# Patient Record
Sex: MALE | Race: BLACK OR AFRICAN AMERICAN | Employment: UNEMPLOYED | URBAN - METROPOLITAN AREA
[De-identification: names, ages, dates, MRNs, and addresses within clinical notes are randomized per-mention and may not be internally consistent; named-entity substitution may affect disease eponyms.]

---

## 2018-10-21 ENCOUNTER — HOSPITAL ENCOUNTER (EMERGENCY)
Age: 38
Discharge: HOME | End: 2018-10-21
Attending: EMERGENCY MEDICINE
Payer: COMMERCIAL

## 2018-10-21 VITALS
RESPIRATION RATE: 20 BRPM | TEMPERATURE: 98.6 F | HEART RATE: 95 BPM | HEIGHT: 74 IN | WEIGHT: 224 LBS | BODY MASS INDEX: 28.75 KG/M2 | OXYGEN SATURATION: 98 % | DIASTOLIC BLOOD PRESSURE: 82 MMHG | SYSTOLIC BLOOD PRESSURE: 125 MMHG

## 2018-10-21 DIAGNOSIS — J06.9 VIRAL URI WITH COUGH: Primary | ICD-10-CM

## 2018-10-21 LAB
FLUAV AG NPH QL IA: NEGATIVE
FLUBV AG NOSE QL IA: NEGATIVE

## 2018-10-21 PROCEDURE — 87081 CULTURE SCREEN ONLY: CPT | Performed by: PHYSICIAN ASSISTANT

## 2018-10-21 PROCEDURE — 74011250637 HC RX REV CODE- 250/637: Performed by: PHYSICIAN ASSISTANT

## 2018-10-21 PROCEDURE — 87804 INFLUENZA ASSAY W/OPTIC: CPT | Performed by: PHYSICIAN ASSISTANT

## 2018-10-21 PROCEDURE — 99282 EMERGENCY DEPT VISIT SF MDM: CPT

## 2018-10-21 RX ORDER — IBUPROFEN 800 MG/1
800 TABLET ORAL
Qty: 20 TAB | Refills: 0 | Status: SHIPPED | OUTPATIENT
Start: 2018-10-21 | End: 2018-10-28

## 2018-10-21 RX ORDER — IBUPROFEN 400 MG/1
800 TABLET ORAL
Status: COMPLETED | OUTPATIENT
Start: 2018-10-21 | End: 2018-10-21

## 2018-10-21 RX ADMIN — IBUPROFEN 800 MG: 400 TABLET, FILM COATED ORAL at 21:12

## 2018-10-21 NOTE — LETTER
NOTIFICATION OF RETURN TO WORK 
 
10/21/2018 9:30 PM 
 
Mr. Mitzy Figueroa [facility] [address] [facility] [address] [place] To Whom It May Concern: 
 
Mitzy Figueroa was under the care of [address] [facility] EMERGENCY DEPT. He will be able to return to work on Tuesday, 10/23/18. If there are questions or concerns please have the patient contact our office. Sincerely, Lexie Ovalle PA-C

## 2018-10-22 NOTE — DISCHARGE INSTRUCTIONS

## 2018-10-22 NOTE — ED PROVIDER NOTES
EMERGENCY DEPARTMENT HISTORY AND PHYSICAL EXAM 
 
8:51 PM 
 
 
Date: 10/21/2018 Patient Name: Steffen Carreon History of Presenting Illness Chief Complaint Patient presents with  Generalized Body Aches  Cough  Sore Throat History Provided By: Patient Chief Complaint: sore throat, congestion, chills, body aches, cough Duration:  Days Timing:  Acute Location: HEENT Quality: Aching Severity: 10 out of 10 Modifying Factors: no relief with OTC meds Associated Symptoms: denies any other associated signs or symptoms Additional History (Context): Steffen Carreon is a 34 y.o. male who presents to the emergency department for evaluation of sore throat, congestion, body aches, chills, and cough. Cough productive of clear sputum. Wife is here being seen for same complaints. He has been taking OTC cold meds without relief. Pt denies any fevers headache, dizziness or light headedness, CP or discomfort, SOB, n/v/d/c, abd pain, back pain, diaphoresis, melena/hematochezia, dysuria, hematuria, frequency, focal weakness/numbness/tingling, or rash. Patient has no other complaints at this time. PCP:  None Past History Past Medical History: 
Past Medical History:  
Diagnosis Date  Asthma Past Surgical History: 
History reviewed. No pertinent surgical history. Family History: 
History reviewed. No pertinent family history. Social History: 
Social History Tobacco Use  Smoking status: Former Smoker Packs/day: 1.00 Substance Use Topics  Alcohol use: No  
 Drug use: No  
 
 
Allergies: 
No Known Allergies Review of Systems Review of Systems Constitutional: Negative for chills and fever. HENT: Positive for congestion, rhinorrhea and sore throat. Respiratory: Positive for cough. Negative for shortness of breath. Cardiovascular: Negative for chest pain.   
Gastrointestinal: Negative for abdominal pain, constipation, diarrhea, nausea and vomiting. Genitourinary: Negative for dysuria, frequency and hematuria. Musculoskeletal: Positive for myalgias. Negative for back pain. Skin: Negative for rash and wound. Neurological: Negative for dizziness and headaches. Physical Exam  
 
Visit Vitals /82 (BP 1 Location: Left arm, BP Patient Position: At rest) Pulse 95 Temp 98.6 °F (37 °C) Resp 20 Ht 6' 2\" (1.88 m) Wt 101.6 kg (224 lb) SpO2 98% BMI 28.76 kg/m² Physical Exam  
Constitutional: He is oriented to person, place, and time. He appears well-developed and well-nourished. No distress. HENT:  
Head: Normocephalic and atraumatic. Bilateral erythematous nasal turbinates with rhinorrhea. Posterior oropharynx erythematous. Eyes: Conjunctivae and EOM are normal. Pupils are equal, round, and reactive to light. Neck: Normal range of motion. Neck supple. No thyromegaly present. Cardiovascular: Normal rate, regular rhythm and normal heart sounds. Pulmonary/Chest: Effort normal and breath sounds normal. No respiratory distress. He has no wheezes. He has no rales. He exhibits no tenderness. Lungs CTAB Abdominal: Soft. Bowel sounds are normal. He exhibits no distension. There is no tenderness. There is no rebound and no guarding. Musculoskeletal: Normal range of motion. He exhibits no edema or deformity. Lymphadenopathy:  
  He has no cervical adenopathy. Neurological: He is alert and oriented to person, place, and time. Skin: Skin is warm and dry. He is not diaphoretic. Psychiatric: He has a normal mood and affect. Nursing note and vitals reviewed. Diagnostic Study Results Labs - Recent Results (from the past 12 hour(s)) INFLUENZA A & B AG (RAPID TEST) Collection Time: 10/21/18  8:50 PM  
Result Value Ref Range Influenza A Antigen NEGATIVE  NEG Influenza B Antigen NEGATIVE  NEG    
STREP THROAT SCREEN Collection Time: 10/21/18  8:50 PM  
Result Value Ref Range Special Requests: NO SPECIAL REQUESTS Strep Screen NEGATIVE Culture result: PENDING Radiologic Studies - No results found. Medical Decision Making I am the first provider for this patient. I reviewed the vital signs, available nursing notes, past medical history, past surgical history, family history and social history. Vital Signs-Reviewed the patient's vital signs. Pulse Oximetry Analysis -  98% on room air (Interpretation) Records Reviewed: Nursing Notes and Old Medical Records (Time of Review: 8:51 PM) 
 
ED Course: Progress Notes, Reevaluation, and Consults: 
 
Provider Notes (Medical Decision Making):  
Differential Diagnosis:  influenza, mononucleosis, acute bronchitis, URI, streptococcal pharyngitis, pertussis, pneumonia, asthma exacerbation, allergic rhinitis Plan:  Pt presents in NAD, vitals wnl. Exam and HPI c/w viral etiology. Flu and strep negative. Will DC home with motrin. Advised on supportive care. At this time, patient is stable and appropriate for discharge home. Patient demonstrates understanding of current diagnoses and is in agreement with the treatment plan. They are advised that while the likelihood of serious underlying condition is low at this point given the evaluation performed today, we cannot fully rule it out. They are advised to immediately return with any new symptoms or worsening of current condition. All questions have been answered. Patient is given educational material regarding their diagnoses, including danger symptoms and when to return to the ED. Diagnosis Clinical Impression: 1. Viral URI with cough Disposition: [address] Follow-up Information Follow up With Specialties Details Why Contact [facility] 22  Call in 2 days To establish primary care [facility] [address] [phone] [address] [facility] EMERGENCY DEPT Emergency Medicine Go to As needed, If symptoms worsen [address] [address] [phone] Medication List  
  
START taking these medications   
ibuprofen 800 mg tablet Commonly known as:  MOTRIN Take 1 Tab by mouth every six (6) hours as needed for Pain for up to 7 days. Where to Get Your Medications Information about where to get these medications is not yet available Ask your nurse or doctor about these medications · ibuprofen 800 mg tablet 
  
 
_______________________________

## 2018-10-22 NOTE — ED NOTES
I have reviewed discharge instructions with the patient. The patient verbalized understanding. Patient armband removed and given to patient to take home. Patient was informed of the privacy risks if armband lost or stolen Current Discharge Medication List  
  
START taking these medications Details  
ibuprofen (MOTRIN) 800 mg tablet Take 1 Tab by mouth every six (6) hours as needed for Pain for up to 7 days. Qty: 20 Tab, Refills: 0

## 2018-10-24 LAB
B-HEM STREP THROAT QL CULT: NEGATIVE
BACTERIA SPEC CULT: NORMAL
SERVICE CMNT-IMP: NORMAL

## 2018-12-05 ENCOUNTER — APPOINTMENT (OUTPATIENT)
Dept: GENERAL RADIOLOGY | Age: 38
End: 2018-12-05
Attending: PHYSICIAN ASSISTANT
Payer: COMMERCIAL

## 2018-12-05 ENCOUNTER — HOSPITAL ENCOUNTER (EMERGENCY)
Age: 38
Discharge: HOME | End: 2018-12-05
Attending: EMERGENCY MEDICINE
Payer: COMMERCIAL

## 2018-12-05 VITALS
TEMPERATURE: 98.6 F | WEIGHT: 230 LBS | BODY MASS INDEX: 29.52 KG/M2 | SYSTOLIC BLOOD PRESSURE: 145 MMHG | DIASTOLIC BLOOD PRESSURE: 90 MMHG | HEART RATE: 66 BPM | HEIGHT: 74 IN | OXYGEN SATURATION: 97 % | RESPIRATION RATE: 16 BRPM

## 2018-12-05 DIAGNOSIS — M79.672 LEFT FOOT PAIN: Primary | ICD-10-CM

## 2018-12-05 DIAGNOSIS — M21.619 BUNION: ICD-10-CM

## 2018-12-05 LAB — GLUCOSE BLD STRIP.AUTO-MCNC: 97 MG/DL (ref 70–110)

## 2018-12-05 PROCEDURE — 82962 GLUCOSE BLOOD TEST: CPT

## 2018-12-05 PROCEDURE — 73630 X-RAY EXAM OF FOOT: CPT

## 2018-12-05 PROCEDURE — 99283 EMERGENCY DEPT VISIT LOW MDM: CPT

## 2018-12-05 RX ORDER — TRAMADOL HYDROCHLORIDE 50 MG/1
50 TABLET ORAL
Qty: 10 TAB | Refills: 0 | Status: SHIPPED | OUTPATIENT
Start: 2018-12-05 | End: 2019-03-05

## 2018-12-05 RX ORDER — NAPROXEN 500 MG/1
500 TABLET ORAL 2 TIMES DAILY WITH MEALS
Qty: 20 TAB | Refills: 0 | Status: SHIPPED | OUTPATIENT
Start: 2018-12-05 | End: 2019-03-05

## 2018-12-05 NOTE — LETTER
NOTIFICATION OF RETURN TO WORK / SCHOOL 
12/5/2018 Mr. Neli Maldonado [facility] [address] [facility] [address] To Whom It May Concern: 
 
Neli Maldonado was seen in the ED on 12/5/18 and may be excused from work for 2 days. Sincerely, Mya Bush PA-C

## 2018-12-06 NOTE — DISCHARGE INSTRUCTIONS
Foot Pain: Care Instructions  Your Care Instructions  Foot injuries that cause pain and swelling are fairly common. Almost all sports or home repair projects can cause a misstep that ends up as foot pain. Normal wear and tear, especially as you get older, also can cause foot pain. Most minor foot injuries will heal on their own, and home treatment is usually all you need to do. If you have a severe injury, you may need tests and treatment. Follow-up care is a key part of your treatment and safety. Be sure to make and go to all appointments, and call your doctor if you are having problems. It's also a good idea to know your test results and keep a list of the medicines you take. How can you care for yourself at home? · Take pain medicines exactly as directed. ? If the doctor gave you a prescription medicine for pain, take it as prescribed. ? If you are not taking a prescription pain medicine, ask your doctor if you can take an over-the-counter medicine. · Rest and protect your foot. Take a break from any activity that may cause pain. · Put ice or a cold pack on your foot for 10 to 20 minutes at a time. Put a thin cloth between the ice and your skin. · Prop up the sore foot on a pillow when you ice it or anytime you sit or lie down during the next 3 days. Try to keep it above the level of your heart. This will help reduce swelling. · Your doctor may recommend that you wrap your foot with an elastic bandage. Keep your foot wrapped for as long as your doctor advises. · If your doctor recommends crutches, use them as directed. · Wear roomy footwear. · As soon as pain and swelling end, begin gentle exercises of your foot. Your doctor can tell you which exercises will help. When should you call for help? Call 911 anytime you think you may need emergency care.  For example, call if:    · Your foot turns pale, white, blue, or cold.    Call your doctor now or seek immediate medical care if:    · You cannot move or stand on your foot.     · Your foot looks twisted or out of its normal position.     · Your foot is not stable when you step down.     · You have signs of infection, such as:  ? Increased pain, swelling, warmth, or redness. ? Red streaks leading from the sore area. ? Pus draining from a place on your foot. ? A fever.     · Your foot is numb or tingly.    Watch closely for changes in your health, and be sure to contact your doctor if:    · You do not get better as expected.     · You have bruises from an injury that last longer than 2 weeks. Where can you learn more? Go to http://aman-nivia.info/. Enter L343 in the search box to learn more about \"Foot Pain: Care Instructions. \"  Current as of: November 29, 2017  Content Version: 11.8  © 2374-1734 [employer]. Care instructions adapted under license by [employer] (which disclaims liability or warranty for this information). If you have questions about a medical condition or this instruction, always ask your healthcare professional. [place] [address] any warranty or liability for your use of this information. haystagg Activation    Thank you for requesting access to [employer]stagg. Please follow the instructions below to securely access and download your online medical record. haystagg allows you to send messages to your doctor, view your test results, renew your prescriptions, schedule appointments, and more. How Do I Sign Up? 1. In your internet browser, go to [email or web address]  2. Click on the First Time User? Click Here link in the Sign In box. You will be redirect to the New Member Sign Up page. 3. Enter your haystagg Access Code exactly as it appears below. You will not need to use this code after youve completed the sign-up process. If you do not sign up before the expiration date, you must request a new code.     haystagg Access Code: [email or web address]  Expires: 1/19/2019  7:45 PM (This is the date your [employer] access code will )    4. Enter the last four digits of your Social Security Number (xxxx) and Date of Birth (mm/dd/yyyy) as indicated and click Submit. You will be taken to the next sign-up page. 5. Create a Behalft ID. This will be your [employer] login ID and cannot be changed, so think of one that is secure and easy to remember. 6. Create a [employer] password. You can change your password at any time. 7. Enter your Password Reset Question and Answer. This can be used at a later time if you forget your password. 8. Enter your e-mail address. You will receive e-mail notification when new information is available in [address] 9. Click Sign Up. You can now view and download portions of your medical record. 10. Click the Download Summary menu link to download a portable copy of your medical information. Additional Information    If you have questions, please visit the Frequently Asked Questions section of the [employer] website at [email or web address] Remember, [employer] is NOT to be used for urgent needs. For medical emergencies, dial 911. Complete all medications as prescribed. Follow-up with podiatry in 1 week. Return to the ED immediately for any new or worsening symptoms.

## 2018-12-06 NOTE — ED PROVIDER NOTES
EMERGENCY DEPARTMENT HISTORY AND PHYSICAL EXAM 
 
Date: 12/5/2018 Patient Name: Jazmín Bermudez History of Presenting Illness Chief Complaint Patient presents with  Foot Pain History Provided By: patient Chief Complaint: foot pain Duration: several days Timing: acute Location: L foot Lulu  Severity:mild Modifying Factors: worse with walking Associated Symptoms: none Additional History (Context): Jazmín Bermudez is a 34 y.o. male with PMH asthma who presents with complaints of L foot pain x several days. Denies tx PTA. Pain is worse on ambulation. No other complaints. PCP: None Past History Past Medical History: 
Past Medical History:  
Diagnosis Date  Asthma Past Surgical History: No past surgical history on file. Family History: No family history on file. Social History: 
Social History Tobacco Use  Smoking status: Former Smoker Packs/day: 1.00 Substance Use Topics  Alcohol use: No  
 Drug use: No  
 
 
Allergies: 
No Known Allergies Review of Systems Review of Systems Constitutional: Negative. Negative for chills and fever. HENT: Negative. Negative for congestion, ear pain and rhinorrhea. Eyes: Negative. Negative for pain and redness. Respiratory: Negative. Negative for cough, shortness of breath, wheezing and stridor. Cardiovascular: Negative. Negative for chest pain and leg swelling. Gastrointestinal: Negative. Negative for abdominal pain, constipation, diarrhea, nausea and vomiting. Genitourinary: Negative. Negative for dysuria and frequency. Musculoskeletal: Positive for arthralgias. Negative for back pain and neck pain. Skin: Negative. Negative for rash and wound. Neurological: Negative. Negative for dizziness, seizures, syncope and headaches. All other systems reviewed and are negative. All Other Systems Negative Physical Exam  
 
Vitals:  
 12/05/18 1932 BP: 145/90 Pulse: 66 Resp: 16 Temp: 98.6 °F (37 °C) SpO2: 97% Weight: 104.3 kg (230 lb) Height: 6' 2\" (1.88 m) Physical Exam  
Constitutional: He is oriented to person, place, and time. He appears well-developed and well-nourished. No distress. HENT:  
Head: Normocephalic and atraumatic. Eyes: Conjunctivae are normal. Right eye exhibits no discharge. Left eye exhibits no discharge. No scleral icterus. Neck: Normal range of motion. Neck supple. Cardiovascular: Normal rate, regular rhythm and intact distal pulses. Pulmonary/Chest: Effort normal. No stridor. No respiratory distress. Musculoskeletal: Normal range of motion. He exhibits tenderness. He exhibits no edema or deformity. LLE: intact pulses, cap RF < 3 sec, no obvious deformity, ROM of all joints intact, cap RF < sec, bunion noted, TTP noted along the medial aspect of the second digit. No edema noted. Neurological: He is alert and oriented to person, place, and time. Coordination normal.  
Gait is steady. Able to ambulate without difficulty. Skin: Skin is warm and dry. No rash noted. He is not diaphoretic. No erythema. Psychiatric: He has a normal mood and affect. His behavior is normal. Thought content normal.  
Nursing note and vitals reviewed. Diagnostic Study Results Labs - Recent Results (from the past 12 hour(s)) GLUCOSE, POC Collection Time: 12/05/18  7:51 PM  
Result Value Ref Range Glucose (POC) 97 70 - 110 mg/dL Radiologic Studies -  
XR FOOT LT MIN 3 V    (Results Pending) DJD without evidence of acute process CT Results  (Last 48 hours) None CXR Results  (Last 48 hours) None Medical Decision Making I am the first provider for this patient. I reviewed the vital signs, available nursing notes, past medical history, past surgical history, family history and social history. Vital Signs-Reviewed the patient's vital signs. Records Reviewed: Mya Bush PA-C 7:19 PM  
 
Procedures: 
Procedures Provider Notes (Medical Decision Making): Impression:  L foot pain, bunion MED RECONCILIATION: 
No current facility-administered medications for this encounter. No current outpatient medications on file. Disposition: D/c 
 
DISCHARGE NOTE:  
Patient is stable for discharge at this time. Rx for naproxen and short course of ultram given. Rest and follow-up with podiatry this week. Return to the ED immediately for any new or worsening sx. April Talbert Aschoff, PA-C 8:27 PM  
 
Follow-up Information Follow up With Specialties Details Why Contact Info Katarina Chopra DPM Podiatry Schedule an appointment as soon as possible for a visit in 1 week  [address] [place] [address] 
[phone] [address] [facility] EMERGENCY DEPT Emergency Medicine  As needed, If symptoms worsen [address] [place] [address] 
[phone] Diagnosis Clinical Impression: 1. Left foot pain 2. Bunion

## 2019-03-01 ENCOUNTER — HOSPITAL ENCOUNTER (INPATIENT)
Age: 39
LOS: 4 days | Discharge: HOME | DRG: 885 | End: 2019-03-05
Attending: EMERGENCY MEDICINE | Admitting: PSYCHIATRY & NEUROLOGY
Payer: COMMERCIAL

## 2019-03-01 DIAGNOSIS — F31.30 BIPOLAR AFFECTIVE DISORDER, CURRENT EPISODE DEPRESSED, CURRENT EPISODE SEVERITY UNSPECIFIED (HCC): ICD-10-CM

## 2019-03-01 DIAGNOSIS — F14.10 COCAINE ABUSE (HCC): ICD-10-CM

## 2019-03-01 DIAGNOSIS — F32.A DEPRESSION, UNSPECIFIED DEPRESSION TYPE: ICD-10-CM

## 2019-03-01 DIAGNOSIS — R45.851 SUICIDAL IDEATION: Primary | ICD-10-CM

## 2019-03-01 PROBLEM — F31.9 BIPOLAR DISORDER, UNSPECIFIED (HCC): Status: ACTIVE | Noted: 2019-03-01

## 2019-03-01 LAB
ALBUMIN SERPL-MCNC: 3.8 G/DL (ref 3.4–5)
ALBUMIN/GLOB SERPL: 1.2 {RATIO} (ref 0.8–1.7)
ALP SERPL-CCNC: 62 U/L (ref 45–117)
ALT SERPL-CCNC: 22 U/L (ref 16–61)
AMPHET UR QL SCN: NEGATIVE
ANION GAP SERPL CALC-SCNC: 6 MMOL/L (ref 3–18)
APPEARANCE UR: CLEAR
AST SERPL-CCNC: 15 U/L (ref 15–37)
BARBITURATES UR QL SCN: NEGATIVE
BASOPHILS # BLD: 0 K/UL (ref 0–0.1)
BASOPHILS NFR BLD: 0 % (ref 0–2)
BENZODIAZ UR QL: NEGATIVE
BILIRUB SERPL-MCNC: 1 MG/DL (ref 0.2–1)
BILIRUB UR QL: NEGATIVE
BUN SERPL-MCNC: 14 MG/DL (ref 7–18)
BUN/CREAT SERPL: 13 (ref 12–20)
CALCIUM SERPL-MCNC: 8.7 MG/DL (ref 8.5–10.1)
CANNABINOIDS UR QL SCN: NEGATIVE
CHLORIDE SERPL-SCNC: 105 MMOL/L (ref 100–108)
CO2 SERPL-SCNC: 31 MMOL/L (ref 21–32)
COCAINE UR QL SCN: POSITIVE
COLOR UR: YELLOW
CREAT SERPL-MCNC: 1.12 MG/DL (ref 0.6–1.3)
DIFFERENTIAL METHOD BLD: ABNORMAL
EOSINOPHIL # BLD: 0.1 K/UL (ref 0–0.4)
EOSINOPHIL NFR BLD: 1 % (ref 0–5)
ERYTHROCYTE [DISTWIDTH] IN BLOOD BY AUTOMATED COUNT: 13.7 % (ref 11.6–14.5)
ETHANOL SERPL-MCNC: <3 MG/DL (ref 0–3)
GLOBULIN SER CALC-MCNC: 3.1 G/DL (ref 2–4)
GLUCOSE SERPL-MCNC: 86 MG/DL (ref 74–99)
GLUCOSE UR STRIP.AUTO-MCNC: NEGATIVE MG/DL
HCT VFR BLD AUTO: 48.9 % (ref 36–48)
HDSCOM,HDSCOM: ABNORMAL
HGB BLD-MCNC: 16.9 G/DL (ref 13–16)
HGB UR QL STRIP: NEGATIVE
KETONES UR QL STRIP.AUTO: 15 MG/DL
LEUKOCYTE ESTERASE UR QL STRIP.AUTO: NEGATIVE
LYMPHOCYTES # BLD: 1.9 K/UL (ref 0.9–3.6)
LYMPHOCYTES NFR BLD: 15 % (ref 21–52)
MCH RBC QN AUTO: 30 PG (ref 24–34)
MCHC RBC AUTO-ENTMCNC: 34.6 G/DL (ref 31–37)
MCV RBC AUTO: 86.7 FL (ref 74–97)
METHADONE UR QL: NEGATIVE
MONOCYTES # BLD: 0.7 K/UL (ref 0.05–1.2)
MONOCYTES NFR BLD: 5 % (ref 3–10)
NEUTS SEG # BLD: 9.4 K/UL (ref 1.8–8)
NEUTS SEG NFR BLD: 79 % (ref 40–73)
NITRITE UR QL STRIP.AUTO: NEGATIVE
OPIATES UR QL: NEGATIVE
PCP UR QL: NEGATIVE
PH UR STRIP: 5 [PH] (ref 5–8)
PLATELET # BLD AUTO: 178 K/UL (ref 135–420)
PMV BLD AUTO: 10.2 FL (ref 9.2–11.8)
POTASSIUM SERPL-SCNC: 3.9 MMOL/L (ref 3.5–5.5)
PROT SERPL-MCNC: 6.9 G/DL (ref 6.4–8.2)
PROT UR STRIP-MCNC: NEGATIVE MG/DL
RBC # BLD AUTO: 5.64 M/UL (ref 4.7–5.5)
SODIUM SERPL-SCNC: 142 MMOL/L (ref 136–145)
SP GR UR REFRACTOMETRY: 1.03 (ref 1–1.03)
UROBILINOGEN UR QL STRIP.AUTO: 0.2 EU/DL (ref 0.2–1)
WBC # BLD AUTO: 12 K/UL (ref 4.6–13.2)

## 2019-03-01 PROCEDURE — 85025 COMPLETE CBC W/AUTO DIFF WBC: CPT

## 2019-03-01 PROCEDURE — 74011250637 HC RX REV CODE- 250/637: Performed by: PSYCHIATRY & NEUROLOGY

## 2019-03-01 PROCEDURE — 65220000003 HC RM SEMIPRIVATE PSYCH

## 2019-03-01 PROCEDURE — 81003 URINALYSIS AUTO W/O SCOPE: CPT

## 2019-03-01 PROCEDURE — 80307 DRUG TEST PRSMV CHEM ANLYZR: CPT

## 2019-03-01 PROCEDURE — 99284 EMERGENCY DEPT VISIT MOD MDM: CPT

## 2019-03-01 PROCEDURE — 80053 COMPREHEN METABOLIC PANEL: CPT

## 2019-03-01 RX ORDER — HYDROXYZINE PAMOATE 50 MG/1
50 CAPSULE ORAL
Status: DISCONTINUED | OUTPATIENT
Start: 2019-03-01 | End: 2019-03-05 | Stop reason: HOSPADM

## 2019-03-01 RX ORDER — LORAZEPAM 2 MG/ML
1-2 INJECTION INTRAMUSCULAR
Status: DISCONTINUED | OUTPATIENT
Start: 2019-03-01 | End: 2019-03-05 | Stop reason: HOSPADM

## 2019-03-01 RX ORDER — IBUPROFEN 400 MG/1
400 TABLET ORAL
Status: DISCONTINUED | OUTPATIENT
Start: 2019-03-01 | End: 2019-03-05 | Stop reason: HOSPADM

## 2019-03-01 RX ORDER — ACETAMINOPHEN 500 MG
2 TABLET ORAL
Status: DISCONTINUED | OUTPATIENT
Start: 2019-03-01 | End: 2019-03-05 | Stop reason: HOSPADM

## 2019-03-01 RX ORDER — MIRTAZAPINE 15 MG/1
15 TABLET, FILM COATED ORAL
Status: DISCONTINUED | OUTPATIENT
Start: 2019-03-01 | End: 2019-03-05 | Stop reason: HOSPADM

## 2019-03-01 RX ORDER — DIVALPROEX SODIUM 250 MG/1
500 TABLET, DELAYED RELEASE ORAL 2 TIMES DAILY
Status: DISCONTINUED | OUTPATIENT
Start: 2019-03-01 | End: 2019-03-05 | Stop reason: HOSPADM

## 2019-03-01 RX ORDER — TRAZODONE HYDROCHLORIDE 50 MG/1
50 TABLET ORAL
Status: DISCONTINUED | OUTPATIENT
Start: 2019-03-01 | End: 2019-03-05 | Stop reason: HOSPADM

## 2019-03-01 RX ORDER — HALOPERIDOL 5 MG/1
5 TABLET ORAL
Status: DISCONTINUED | OUTPATIENT
Start: 2019-03-01 | End: 2019-03-05 | Stop reason: HOSPADM

## 2019-03-01 RX ORDER — HALOPERIDOL 5 MG/ML
5 INJECTION INTRAMUSCULAR
Status: DISCONTINUED | OUTPATIENT
Start: 2019-03-01 | End: 2019-03-05 | Stop reason: HOSPADM

## 2019-03-01 RX ADMIN — DIVALPROEX SODIUM 500 MG: 250 TABLET, DELAYED RELEASE ORAL at 20:26

## 2019-03-01 RX ADMIN — MIRTAZAPINE 15 MG: 15 TABLET, FILM COATED ORAL at 20:26

## 2019-03-01 RX ADMIN — NICOTINE POLACRILEX 2 MG: 2 GUM, CHEWING ORAL at 19:14

## 2019-03-01 RX ADMIN — DIVALPROEX SODIUM 500 MG: 250 TABLET, DELAYED RELEASE ORAL at 11:46

## 2019-03-01 RX ADMIN — NICOTINE POLACRILEX 2 MG: 2 GUM, CHEWING ORAL at 13:42

## 2019-03-01 NOTE — BH NOTES
Pt admitted for c/o SI and cocaine abuse. Pt last smoked cocaine this morning. Pt denies EtOH or other illicit drug use. Pt states that he has auditory hallucinations of the devil speaking to him; however, pt denies currently hearing them. Pt denies SI and VH. Pt has a long hx of crack cocaine abuse going back to his childhood. Pt has spent much of his adult life incarcerated, pt was just released from the [facility] in August after being there for 5 years for a probation violation stemming from an earlier grand larceny charge. Pt has NKA and no pertinent medical conditions. Pt has a hx of bipolar disorder and anxiety, he was diagnosed in 2010 while incarcerated. Pt denies current suicidal and homicidal thoughts and hallucinations. Pt is able to contract for safety. Will continue to monitor and provide intervention as necessary.

## 2019-03-01 NOTE — BSMART NOTE
SOCIAL WORK GROUP THERAPY PROGRESS NOTE Group Time:    1:15pm 
 
Group Topic:  Coping Skills    C D Issues Group Participation:  
 
Missed last 1/2 of session due to meeting with his Dr. Bulmaro Trinh affect flat , somewhat dysphoric. Pt moderately involved during group discussion but remained attentive. Listened to peers comments. on [employer]" for taking responsibility for our Happiness including commitment to change, self-care, setting limits, goal setting & letting go. Now aware of strategies to keep a \"Journal\" for moods, cognitions, behavior & outcome.

## 2019-03-01 NOTE — ED NOTES
TRANSFER - OUT REPORT: 
 
Verbal report given to Chary Granados on [facility] Moll  being transferred to [facility] for routine progression of care Report consisted of patients Situation, Background, Assessment and  
Recommendations(SBAR). Information from the following report(s) SBAR, ED Summary and MAR was reviewed with the receiving nurse. Lines:    
 
Opportunity for questions and clarification was provided. Patient transported with: 
 BriefMe
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
## 2019-03-01 NOTE — BSMART NOTE
OCCUPATIONAL THERAPY PROGRESS NOTE Group Time:  0806 Attendance: The patient attended full group. Participation: The patient participated fully in the activity. Attention: The patient was able to focus on the activity. Interaction: The patient occasionally  interacts with others.  
Discussed his cocaine use, encouraged to seek support for recovery through NA and possibly aftercare program.

## 2019-03-01 NOTE — PROGRESS NOTES
Problem: Depressed Mood (Adult/Pediatric) Goal: *STG: Attends activities and groups Pt will participate in at least 2 groups per shift. Outcome: Progressing Towards Goal 
aeb pt present at all groups this shift Goal: *STG: Remains safe in hospital 
Pt will be free from harm each shift Outcome: Progressing Towards Goal 
aeb pt free of harm this shift Problem: Suicide/Homicide (Adult/Pediatric) Goal: *STG: Seeks staff when feelings of self harm or harm towards others arise Pt will contract for safety each shift. Outcome: Progressing Towards Goal 
aeb pt agrees to contract for safety this shift

## 2019-03-01 NOTE — BSMART NOTE
Comprehensive Assessment Form Part 1 Section I - Disposition The Medical Doctor to Psychiatrist conference was completed. The Medical Doctor is in agreement with Psychiatrist disposition because of (reason) depression with suicidal ideation with plan. The plan is admit. The on-call Psychiatrist consulted was Dr. Mark Alatorre. The admitting Psychiatrist will be Dr. Mark Alatorre. The admitting Diagnosis is Bipolar Disorder. Admitted to room Unit ADCD Section II - Integrated Summary Summary:  37year old male who presented voluntarily to the ER reporting depression with suicidal ideations. Interviewed in room 17 @ the request of Angi Breaux. Patient lying calmly on ER bed. Dressed in his personal attire. Alert and oriented. Cooperative with interview. Depressed mood. Tearful at times during interview. Patient states he was release from [facility] on August 17, 2018.  his \" soul mate\" and Mother of his 25year old daughter on October 4, 2018. Is working full time for [email or web address]. States at current this is the longest time he has been able to stay out of prison since the age of 22-23. States yesterday his Wife and him exchange some words, reports Wife told him she fears he will mess up again and go back to prison. Patient reports he went to work yesterday and \" I had a breakdown\" States he went to a [employer] and sat in his vehicle and \" sobbed\". States he went and gave Plasma with plan to do something nice for his Wife. Reports he never went home but went on a crack cocaine binge spending $200-300 hundred dollars. Reports he has had suicidal thoughts for sometime but has never told anyone before, Currently suicidal with plan to cut his wrist. Denied access to guns \" I'm a felon. I can't be around guns\". Patient reports mood swings. Reports he sleeps poorly. Denied any homicidal ideations.  When asked about hallucinations he explained he is unsure but went on to talk about the Devil in his head leading him down the wrong path. Patient also disclosed hypersexual feelings, states he tries not to think about sex but every time his see's a Woman his mind goes there. Inpatient: Reports in 2015 or 2016 went to the [facility]. Outpatient: none, did not follow up after being release from [facility]. Legal: On supervised Probation via the [facility]. Released from [facility] 8/17/18 Medications: none current. While in [facility] took Celexa, Remeron, and Depakote. NKDA The patient is deemed competent to provide informed consent. The Chief Complaint is depression with suicidal ideations. . 
The Precipitant Factors are crack cocaine addiction, non compliance with outpatient Psychiatric follow up, Marital discord. [place] Daily Section V - Substance Abuse The patient is using substances. The patient is using cocaine by inhalation for greater than 10 years with last use on 3/1/19 reports was clean for two weeks prior to last night, used 200-300 dollars . The patient has experienced the following withdrawal symptoms,  cravings and sleep disturbance.  
 
 
Matthew Mccartney RN

## 2019-03-01 NOTE — ED PROVIDER NOTES
EMERGENCY DEPARTMENT HISTORY AND PHYSICAL EXAM 
 
Date: 3/1/2019 Patient Name: Krysten King History of Presenting Illness Chief Complaint Patient presents with  Mental Health Problem History Provided By: Patient Chief Complaint: suicidal, cocaine abuse, depression Duration: Weeks Timing:  Constant Location: NA 
Quality: NA Severity: Severe Modifying Factors: h/o bipolar, off meds Associated Symptoms: denies any other associated signs or symptoms Additional History (Context): Krysten King is a 37 y.o. male with asthma and depression, cocaine addiction who presents with profound sense of impending loss, drug abuse, SI.  Pt out of prison since August and is  to his children's mother. Using cocaine 20+x daily. Realizes he's at risk of losing everything. Is suicidal.  When he's in prison or [facility], he does well b/c he's on psychiatric meds for his bipolar disease. Does not have psychiatrist as outpatient. One prior rehab @4-5yrs ago. PCP: None Current Outpatient Medications Medication Sig Dispense Refill  traMADol (ULTRAM) 50 mg tablet Take 1 Tab by mouth every six (6) hours as needed for Pain. Max Daily Amount: 200 mg. 10 Tab 0  
 naproxen (NAPROSYN) 500 mg tablet Take 1 Tab by mouth two (2) times daily (with meals). 20 Tab 0 Past History Past Medical History: 
Past Medical History:  
Diagnosis Date  Asthma Past Surgical History: No past surgical history on file. Family History: No family history on file. Social History: 
Social History Tobacco Use  Smoking status: Former Smoker Packs/day: 1.00 Substance Use Topics  Alcohol use: No  
 Drug use: No  
 
 
Allergies: 
No Known Allergies Review of Systems Review of Systems Constitutional: Negative for fever. Respiratory: Negative for shortness of breath. Cardiovascular: Negative for chest pain. Psychiatric/Behavioral: Positive for dysphoric mood and suicidal ideas. All other systems reviewed and are negative. All Other Systems Negative Physical Exam  
 
Vitals:  
 03/01/19 0542 BP: 147/89 Pulse: 93 Resp: 16 Temp: 97.5 °F (36.4 °C) SpO2: 98% Physical Exam  
Constitutional: Vital signs are normal. He appears well-developed and well-nourished. He is active. Non-toxic appearance. He does not appear ill. No distress. HENT:  
Head: Normocephalic and atraumatic. Neck: Normal range of motion. Neck supple. Carotid bruit is not present. No tracheal deviation present. No thyromegaly present. Cardiovascular: Normal rate, regular rhythm and normal heart sounds. Exam reveals no gallop and no friction rub. No murmur heard. Pulmonary/Chest: Effort normal and breath sounds normal. No stridor. No respiratory distress. He has no wheezes. He has no rales. He exhibits no tenderness. Abdominal: Soft. He exhibits no distension and no mass. There is no tenderness. There is no rebound, no guarding and no CVA tenderness. Musculoskeletal: Normal range of motion. Neurological: He is alert. Skin: Skin is warm, dry and intact. He is not diaphoretic. No pallor. Psychiatric: He has a normal mood and affect. His speech is normal and behavior is normal. Judgment and thought content normal.  
Tearful, excellent insight into his addiction. Nursing note and vitals reviewed. Diagnostic Study Results Labs - Recent Results (from the past 12 hour(s)) URINALYSIS W/ RFLX MICROSCOPIC Collection Time: 03/01/19  5:45 AM  
Result Value Ref Range Color YELLOW Appearance CLEAR Specific gravity 1.027 1.005 - 1.030    
 pH (UA) 5.0 5.0 - 8.0 Protein NEGATIVE  NEG mg/dL Glucose NEGATIVE  NEG mg/dL Ketone 15 (A) NEG mg/dL Bilirubin NEGATIVE  NEG Blood NEGATIVE  NEG Urobilinogen 0.2 0.2 - 1.0 EU/dL  Nitrites NEGATIVE  NEG    
 Leukocyte Esterase NEGATIVE  NEG    
DRUG SCREEN, URINE Collection Time: 03/01/19  5:45 AM  
Result Value Ref Range BENZODIAZEPINES NEGATIVE  NEG    
 BARBITURATES NEGATIVE  NEG    
 THC (TH-CANNABINOL) NEGATIVE  NEG    
 OPIATES NEGATIVE  NEG    
 PCP(PHENCYCLIDINE) NEGATIVE  NEG    
 COCAINE POSITIVE (A) NEG    
 AMPHETAMINES NEGATIVE  NEG METHADONE NEGATIVE  NEG HDSCOM (NOTE) CBC WITH AUTOMATED DIFF Collection Time: 03/01/19  6:11 AM  
Result Value Ref Range WBC 12.0 4.6 - 13.2 K/uL  
 RBC 5.64 (H) 4.70 - 5.50 M/uL  
 HGB 16.9 (H) 13.0 - 16.0 g/dL HCT 48.9 (H) 36.0 - 48.0 % MCV 86.7 74.0 - 97.0 FL  
 MCH 30.0 24.0 - 34.0 PG  
 MCHC 34.6 31.0 - 37.0 g/dL  
 RDW 13.7 11.6 - 14.5 % PLATELET 821 022 - 191 K/uL MPV 10.2 9.2 - 11.8 FL  
 NEUTROPHILS 79 (H) 40 - 73 % LYMPHOCYTES 15 (L) 21 - 52 % MONOCYTES 5 3 - 10 % EOSINOPHILS 1 0 - 5 % BASOPHILS 0 0 - 2 %  
 ABS. NEUTROPHILS 9.4 (H) 1.8 - 8.0 K/UL  
 ABS. LYMPHOCYTES 1.9 0.9 - 3.6 K/UL  
 ABS. MONOCYTES 0.7 0.05 - 1.2 K/UL  
 ABS. EOSINOPHILS 0.1 0.0 - 0.4 K/UL  
 ABS. BASOPHILS 0.0 0.0 - 0.1 K/UL  
 DF AUTOMATED METABOLIC PANEL, COMPREHENSIVE Collection Time: 03/01/19  6:11 AM  
Result Value Ref Range Sodium 142 136 - 145 mmol/L Potassium 3.9 3.5 - 5.5 mmol/L Chloride 105 100 - 108 mmol/L  
 CO2 31 21 - 32 mmol/L Anion gap 6 3.0 - 18 mmol/L Glucose 86 74 - 99 mg/dL BUN 14 7.0 - 18 MG/DL Creatinine 1.12 0.6 - 1.3 MG/DL  
 BUN/Creatinine ratio 13 12 - 20 GFR est AA >60 >60 ml/min/1.73m2 GFR est non-AA >60 >60 ml/min/1.73m2 Calcium 8.7 8.5 - 10.1 MG/DL Bilirubin, total 1.0 0.2 - 1.0 MG/DL  
 ALT (SGPT) 22 16 - 61 U/L  
 AST (SGOT) 15 15 - 37 U/L Alk. phosphatase 62 45 - 117 U/L Protein, total 6.9 6.4 - 8.2 g/dL Albumin 3.8 3.4 - 5.0 g/dL Globulin 3.1 2.0 - 4.0 g/dL A-G Ratio 1.2 0.8 - 1.7 ETHYL ALCOHOL  Collection Time: 03/01/19  6:11 AM  
 Result Value Ref Range ALCOHOL(ETHYL),SERUM <3 0 - 3 MG/DL Radiologic Studies - No orders to display CT Results  (Last 48 hours) None CXR Results  (Last 48 hours) None Medical Decision Making I am the first provider for this patient. I reviewed the vital signs, available nursing notes, past medical history, past surgical history, family history and social history. Vital Signs-Reviewed the patient's vital signs. Records Reviewed: Nursing Notes Procedures: 
Procedures Provider Notes (Medical Decision Making): clear medically and have crisis evaluate patient. 6:38 AM 
Informed crisis he is ready for evaluation. Gave Mary Anne his information. 9:16 AM 
Cindy Garcia finished crisis eval and is recommending inpatient admission. MED RECONCILIATION: 
No current facility-administered medications for this encounter. Current Outpatient Medications Medication Sig  
 traMADol (ULTRAM) 50 mg tablet Take 1 Tab by mouth every six (6) hours as needed for Pain. Max Daily Amount: 200 mg.  
 naproxen (NAPROSYN) 500 mg tablet Take 1 Tab by mouth two (2) times daily (with meals). Disposition: 
admit Follow-up Information None Current Discharge Medication List  
  
 
 
Diagnosis Clinical Impression: 1. Suicidal ideation 2. Depression, unspecified depression type 3. Bipolar affective disorder, current episode depressed, current episode severity unspecified ([address] 4. Cocaine abuse ([address]

## 2019-03-01 NOTE — PROGRESS NOTES
Psychiatric Admission Note Dictated. Orders done , start Depakote, level Monday AM, start Remeron, Vistaril prn, vitamins

## 2019-03-01 NOTE — BH NOTES
Eats and tolerates evening meal. Offers no complaints. Pleasant and cooperative. Gait appropriate. Takes medicines without incident. Interacts with staff and peers minimally but, appropriately. Gripper socks and 15 minute checks in place for safety. Will continue to monitor and support.

## 2019-03-01 NOTE — ED TRIAGE NOTES
Pt arrived to ED for drug rehab. Pt states that he is feeling depressed, pt states that he is SI \"I would do it any kind of way. \"  Pt reports that he used crack cocaine earlier this morning.

## 2019-03-02 PROCEDURE — 65220000003 HC RM SEMIPRIVATE PSYCH

## 2019-03-02 PROCEDURE — 74011250637 HC RX REV CODE- 250/637: Performed by: PSYCHIATRY & NEUROLOGY

## 2019-03-02 RX ORDER — FAMOTIDINE 20 MG/1
10 TABLET, FILM COATED ORAL 2 TIMES DAILY
Status: DISCONTINUED | OUTPATIENT
Start: 2019-03-02 | End: 2019-03-05 | Stop reason: HOSPADM

## 2019-03-02 RX ORDER — RANITIDINE 150 MG/1
75 TABLET, FILM COATED ORAL 2 TIMES DAILY
Status: DISCONTINUED | OUTPATIENT
Start: 2019-03-02 | End: 2019-03-02 | Stop reason: CLARIF

## 2019-03-02 RX ADMIN — MULTIPLE VITAMINS W/ MINERALS TAB 1 TABLET: TAB at 08:11

## 2019-03-02 RX ADMIN — FAMOTIDINE 10 MG: 20 TABLET ORAL at 21:00

## 2019-03-02 RX ADMIN — DIVALPROEX SODIUM 500 MG: 250 TABLET, DELAYED RELEASE ORAL at 08:11

## 2019-03-02 RX ADMIN — MIRTAZAPINE 15 MG: 15 TABLET, FILM COATED ORAL at 20:35

## 2019-03-02 RX ADMIN — NICOTINE POLACRILEX 2 MG: 2 GUM, CHEWING ORAL at 12:00

## 2019-03-02 RX ADMIN — DIVALPROEX SODIUM 500 MG: 250 TABLET, DELAYED RELEASE ORAL at 20:35

## 2019-03-02 RX ADMIN — IBUPROFEN 400 MG: 400 TABLET ORAL at 08:11

## 2019-03-02 NOTE — BH NOTES
Rubina Brenteugene is not participating in Leisure Activity Group. Group time: 1958 Personal goal for participation:  Decrease Anxiety Goal orientation: passive Group therapy participation: refused Therapeutic interventions reviewed and discussed:  Staff encouraged to  choose relaxation activities to decrease anxiety while interacting with peers Impression of participation:  Pt.  refuse chose to rest in bed despite staff encouragement.

## 2019-03-02 NOTE — PROGRESS NOTES
Problem: Depressed Mood (Adult/Pediatric) Goal: *STG: Attends activities and groups Pt will participate in at least 2 groups per shift. Outcome: Progressing Towards Goal 
Attending groups. Goal: *STG: Remains safe in hospital 
Pt will be free from harm each shift Outcome: Progressing Towards Goal 
No unsafe behaviors. Goal: *STG: Complies with medication therapy Pt will take all medication as prescribed each shift. Outcome: Progressing Towards Goal 
Medication compliant. Comments: Pt calm and cooperative with staff and care. Engages in routine of unit, with peers and group activities. Denies SI, reports depression. Contracts for safety. Denies AV HD. No unsafe behaviors. No acute distress. Able to make needs known.

## 2019-03-02 NOTE — BH NOTES
GROUP THERAPY PROGRESS NOTE Seth Estes is participating in [place]. Group time: 30 minutes Personal goal for participation: rules/regulations Goal orientation: community Group therapy participation: minimal 
 
Therapeutic interventions reviewed and discussed: He was alert and cooperative. Impression of participation: He was cooperative and he was receptive to the rules of the unit.

## 2019-03-02 NOTE — PROGRESS NOTES
[address] Early Physician Daily Progress Note Patient:  Jasson Morton Age:  37 y.o. :  1980 SEX:  male MRN:  [id number] CSN:  [id number] Admit Date:  3/1/2019 DSM 5 Diagnosis Patient Active Problem List  
Diagnosis Code  Bipolar disorder, unspecified ([facility] F31.9 Subjective:  
78-year-old  black male with h/o BPAD and cocaine use disorder. He reports Depakote and Remeron help with his mood. He is on probation and recently got . States he wanted to get clean to be there for her daughters. Current Medications:   
Current Facility-Administered Medications Medication Dose Route Frequency Provider Last Rate Last Dose  haloperidol (HALDOL) tablet 5 mg  5 mg Oral Q4H PRN Randall Lerma MD      
 haloperidol lactate (HALDOL) injection 5 mg  5 mg IntraMUSCular Q4H PRN Randall Lerma MD      
 LORazepam (ATIVAN) injection 1-2 mg  1-2 mg IntraMUSCular Q4H PRN Randall Lerma MD      
 traZODone (DESYREL) tablet 50 mg  50 mg Oral QHS PRN Randall Lerma MD      
 ibuprofen (MOTRIN) tablet 400 mg  400 mg Oral Q4H PRN Randall Lerma MD   400 mg at 19 1320  hydrOXYzine pamoate (VISTARIL) capsule 50 mg  50 mg Oral Q4H PRN Randall Lerma MD      
 divalproex DR (DEPAKOTE) tablet 500 mg  500 mg Oral BID Randall Lerma MD   500 mg at 19 2413  mirtazapine (REMERON) tablet 15 mg  15 mg Oral QHS Randall Lerma MD   15 mg at 19  
 nicotine (NICORETTE) gum 2 mg  2 mg Oral Q1H PRN Randall Lerma MD   2 mg at 19 1200  
 multivitamin, tx-iron-ca-min (THERA-M w/ IRON) tablet 1 Tab  1 Tab Oral DAILY Randall Lerma MD   1 Tab at 19 9922 Compliant with medication:  Yes Side effects from medications:  No  
 
Mental Status Exam 
 
 
Appearance   
General Behavior   Pleasant and cooperative    
Speech form and content, 
Language Associations Form of Thought   Normal flow and volume TP : Logical, goal oriented Mood, Affect Self-Attitude Vital Sense SI/HI/PDW   Depressed No SI, HI, hopelessness Abnormal Perceptions and illusions   Denies    
Delusions   None Anxiety    Denies COGNITION Intelligence Abstraction   Intact Judgement Insight     Limited Medical:  
 
Visit Vitals /78 (BP 1 Location: Left arm, BP Patient Position: Sitting) Pulse 63 Temp 97.2 °F (36.2 °C) Resp 18 Ht 6' 2\" (1.88 m) Wt 99.8 kg (220 lb) SpO2 98% BMI 28.25 kg/m² No results found for this or any previous visit (from the past 24 hour(s)). Recommendation and Plan Treatment Plan 1. Continue current treatment modalities? If no, state rationale and address changes in Treatment Plan under Optional Sections. Yes 2. Continue current medications? If no, state rationale and address changes in Medications under Optional Sections. Yes 3. Referrals or Consultations needed? Specify below and state reason. No 
4. Discharge Planning: Needs Stabilization I certify that this patient's inpatient psychiatric hospital services furnished since the previous certification were, and continue to be, required for treatment that could reasonably be expected to improve the patient's condition, or for diagnostic study, and that the patient continues to need, on a daily basis, active treatment furnished directly by or requiring the supervision of inpatient psychiatric facility personnel. In addition the hospital records show that services furnished were intensive treatment services, admission or related services, or equivalent services. Signed By: Vipul Hartman MD   
 3/2/2019

## 2019-03-03 PROCEDURE — 74011250637 HC RX REV CODE- 250/637: Performed by: PSYCHIATRY & NEUROLOGY

## 2019-03-03 PROCEDURE — 65220000003 HC RM SEMIPRIVATE PSYCH

## 2019-03-03 RX ADMIN — DIVALPROEX SODIUM 500 MG: 250 TABLET, DELAYED RELEASE ORAL at 09:08

## 2019-03-03 RX ADMIN — MIRTAZAPINE 15 MG: 15 TABLET, FILM COATED ORAL at 20:22

## 2019-03-03 RX ADMIN — NICOTINE POLACRILEX 2 MG: 2 GUM, CHEWING ORAL at 09:09

## 2019-03-03 RX ADMIN — FAMOTIDINE 10 MG: 20 TABLET ORAL at 20:22

## 2019-03-03 RX ADMIN — MULTIPLE VITAMINS W/ MINERALS TAB 1 TABLET: TAB at 09:08

## 2019-03-03 RX ADMIN — DIVALPROEX SODIUM 500 MG: 250 TABLET, DELAYED RELEASE ORAL at 20:22

## 2019-03-03 RX ADMIN — FAMOTIDINE 10 MG: 20 TABLET ORAL at 09:08

## 2019-03-03 RX ADMIN — IBUPROFEN 400 MG: 400 TABLET ORAL at 09:08

## 2019-03-03 NOTE — BH NOTES
Patient denies suicidal/homicidal ideations. Denies audio/visual hallucinations. Patient is compliant with meals and medications. Had visitation with wife. Visitation went well. Patient had to be redirected regarding inappropriate overly affectionate behavior with wife in milieu. Patient was compliant with staff redirection. Signed phone, ring, and other personal belongings out to wife. Will continue to monitor patient closely for engagement in treatment and effectiveness of therapeutic interventions.

## 2019-03-03 NOTE — BH NOTES
GROUP THERAPY PROGRESS NOTE Rubina Zavaleta is participating in Recreational Therapy. Group time: 3799 Personal goal for participation: fresh air break/games on the unit Goal orientation: social 
 
Group therapy participation: active Pt. chose to stay on the unit, play games with peers, color shruthi patterns and watch a movie.

## 2019-03-03 NOTE — PROGRESS NOTES
Problem: Depressed Mood (Adult/Pediatric) Goal: *STG: Attends activities and groups Pt will participate in at least 2 groups per shift. Outcome: Progressing Towards Goal 
Attending all groups. Goal: *STG: Remains safe in hospital 
Pt will be free from harm each shift Outcome: Progressing Towards Goal 
Contracts for safety. Goal: *STG: Complies with medication therapy Pt will take all medication as prescribed each shift. Outcome: Progressing Towards Goal 
Medication compliant. Comments: Pt calm and cooperative with staff and care. Engages in routine of unit, with peers and group activities. Denies SI, reports depression. Contracts for safety. Denies AV HD. No unsafe behaviors. No acute distress. Able to make needs known. Reports disrupted sleep at night, MD notified.

## 2019-03-03 NOTE — BH NOTES
GROUP THERAPY PROGRESS NOTE Obi Ortiz is participating in [place]. Group time: 30 minutes Personal goal for participation: rules/regulations Goal orientation: community Group therapy participation: minimal 
 
Therapeutic interventions reviewed and discussed: He was receptive to the rules during group Impression of participation: He was not a management problem during group.

## 2019-03-03 NOTE — PROGRESS NOTES
[address] [facility] Physician Daily Progress Note Patient:  Devera Sever Age:  37 y.o. :  1980 SEX:  male MRN:  [id number] CSN:  [id number] Admit Date:  3/1/2019 DSM 5 Diagnosis Patient Active Problem List  
Diagnosis Code  Bipolar disorder, unspecified ([facility] F31.9 Subjective:  
40-year-old  black male with h/o BPAD and cocaine use disorder. He reports improved mood. States he lost over 30 pounds. He reports Depakote and Remeron help with his mood. He is on probation and recently got . States he wanted to get clean to be there for her daughters. He  Is requesting \" double portions\". Current Medications:   
Current Facility-Administered Medications Medication Dose Route Frequency Provider Last Rate Last Dose  famotidine (PEPCID) tablet 10 mg  10 mg Oral BID Brayan Mccollum MD   10 mg at 19 4875  haloperidol (HALDOL) tablet 5 mg  5 mg Oral Q4H PRN Lott Schwab, MD      
 haloperidol lactate (HALDOL) injection 5 mg  5 mg IntraMUSCular Q4H PRN Lott Schwab, MD      
 LORazepam (ATIVAN) injection 1-2 mg  1-2 mg IntraMUSCular Q4H PRN Lott Schwab, MD      
 traZODone (DESYREL) tablet 50 mg  50 mg Oral QHS PRN Lott Schwab, MD      
 ibuprofen (MOTRIN) tablet 400 mg  400 mg Oral Q4H PRN Lott Schwab, MD   400 mg at 19 6008  hydrOXYzine pamoate (VISTARIL) capsule 50 mg  50 mg Oral Q4H PRN Lott Schwab, MD      
 divalproex DR (DEPAKOTE) tablet 500 mg  500 mg Oral BID Lott Schwab, MD   500 mg at 19 7957  mirtazapine (REMERON) tablet 15 mg  15 mg Oral QHS Lott Schwab, MD   15 mg at 19 288  
 nicotine (NICORETTE) gum 2 mg  2 mg Oral Q1H PRN Lott Schwab, MD   2 mg at 19 8408  multivitamin, tx-iron-ca-min (THERA-M w/ IRON) tablet 1 Tab  1 Tab Oral DAILY Lott Schwab, MD   1 Tab at 19 5433 Compliant with medication:  Yes Side effects from medications:  No  
 
Mental Status Exam 
 
Appearance   
General Behavior   Pleasant and cooperative    
Speech form and content, 
Language Associations Form of Thought   Normal flow and volume TP : Logical, goal oriented Mood, Affect Self-Attitude Vital Sense SI/HI/PDW   Depressed No SI, HI, hopelessness Abnormal Perceptions and illusions   Denies    
Delusions   None Anxiety    Denies COGNITION Intelligence Abstraction   Intact Judgement Insight     Limited Medical:  
 
Visit Vitals /77 (BP 1 Location: Left arm, BP Patient Position: Sitting) Pulse 60 Temp 96 °F (35.6 °C) Resp 18 Ht 6' 2\" (1.88 m) Wt 99.8 kg (220 lb) SpO2 98% BMI 28.25 kg/m² No results found for this or any previous visit (from the past 24 hour(s)). Recommendation and Plan Treatment Plan 1. Continue current treatment modalities? If no, state rationale and address changes in Treatment Plan under Optional Sections. Yes 2. Continue current medications? If no, state rationale and address changes in Medications under Optional Sections. Yes 3. Referrals or Consultations needed? Specify below and state reason. No 
4. Discharge Planning: needs stabilization I certify that this patient's inpatient psychiatric hospital services furnished since the previous certification were, and continue to be, required for treatment that could reasonably be expected to improve the patient's condition, or for diagnostic study, and that the patient continues to need, on a daily basis, active treatment furnished directly by or requiring the supervision of inpatient psychiatric facility personnel. In addition the hospital records show that services furnished were intensive treatment services, admission or related services, or equivalent services. Signed By: Aishwarya Vela MD   
 3/3/2019

## 2019-03-04 PROBLEM — F31.81 SEVERE DEPRESSED BIPOLAR II DISORDER WITHOUT PSYCHOTIC FEATURES (HCC): Status: ACTIVE | Noted: 2019-03-01

## 2019-03-04 PROBLEM — F31.9 BIPOLAR DISORDER, UNSPECIFIED (HCC): Status: RESOLVED | Noted: 2019-03-01 | Resolved: 2019-03-04

## 2019-03-04 LAB — VALPROATE SERPL-MCNC: 54 UG/ML (ref 50–100)

## 2019-03-04 PROCEDURE — 74011250637 HC RX REV CODE- 250/637: Performed by: PSYCHIATRY & NEUROLOGY

## 2019-03-04 PROCEDURE — 36415 COLL VENOUS BLD VENIPUNCTURE: CPT

## 2019-03-04 PROCEDURE — 80164 ASSAY DIPROPYLACETIC ACD TOT: CPT

## 2019-03-04 PROCEDURE — 65220000003 HC RM SEMIPRIVATE PSYCH

## 2019-03-04 PROCEDURE — 97161 PT EVAL LOW COMPLEX 20 MIN: CPT

## 2019-03-04 RX ORDER — CLONIDINE HYDROCHLORIDE 0.1 MG/1
0.1 TABLET ORAL 2 TIMES DAILY
Status: DISCONTINUED | OUTPATIENT
Start: 2019-03-04 | End: 2019-03-05 | Stop reason: HOSPADM

## 2019-03-04 RX ADMIN — FAMOTIDINE 10 MG: 20 TABLET ORAL at 08:34

## 2019-03-04 RX ADMIN — MULTIPLE VITAMINS W/ MINERALS TAB 1 TABLET: TAB at 08:34

## 2019-03-04 RX ADMIN — FAMOTIDINE 10 MG: 20 TABLET ORAL at 20:35

## 2019-03-04 RX ADMIN — NICOTINE POLACRILEX 2 MG: 2 GUM, CHEWING ORAL at 20:50

## 2019-03-04 RX ADMIN — CLONIDINE HYDROCHLORIDE 0.1 MG: 0.1 TABLET ORAL at 20:36

## 2019-03-04 RX ADMIN — MIRTAZAPINE 15 MG: 15 TABLET, FILM COATED ORAL at 20:35

## 2019-03-04 RX ADMIN — DIVALPROEX SODIUM 500 MG: 250 TABLET, DELAYED RELEASE ORAL at 20:35

## 2019-03-04 RX ADMIN — CLONIDINE HYDROCHLORIDE 0.1 MG: 0.1 TABLET ORAL at 14:44

## 2019-03-04 RX ADMIN — DIVALPROEX SODIUM 500 MG: 250 TABLET, DELAYED RELEASE ORAL at 08:34

## 2019-03-04 NOTE — PROGRESS NOTES
Problem: Mobility Impaired (Adult and Pediatric) Goal: *Acute Goals and Plan of Care (Insert Text) Outcome: Resolved/Met Date Met: 03/04/19 
physical Therapy EVALUATION & Discharge Patient: Lukas Powers (89 y.o. male) Date: 3/4/2019 Primary Diagnosis: Bipolar disorder, unspecified ([facility] [F31.9] Precautions: Fall ASSESSMENT AND RECOMMENDATIONS: 
Patient presents today alert and agreeable to therapy, sitting in common area of [facility]. Patient ambulated independently with therapist to his room and sat EOB. Patient c/o left wrist and palm pain with numbness in palm; does not extend into fingers. Patient reports his pain is present in AM when he wakes and during work where he works for the [employer]. 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 Patient also has some visible edema, however no increased warmth of left wrist relative to right. Positive Phalen's test with pain of left wrist. Patient educated on possible carpal tunnel syndrome from overuse. Educated patient on body mechanics and wrist support for work to keep wrist at neutral. Patient also educated on performing gentle finger ROM and to rest and ice wrist. Patient educated never to place iced directly on skin and to use 20mins on and 20mins off to avoid skin damage. Patient then directed to OP PT for follow up and he acknowledged understating of all education. Patient had not further questions and returned to common area at conclusion of session. Skilled physical therapy is not indicated at this time and patient agreeable to D/C from PT at this level of care.  
Discharge Recommendations: Outpatient PT to address possible carpal tunnel in left wrist 
Further Equipment Recommendations for Discharge: left wrist support to keep at neutral (directd patient to find this at pharmacy or local grocer) Evaluation Complexity Eval Complexity: History: LOW Complexity : Zero comorbidities / personal factors that will impact the outcome / POCExam:LOW Complexity : 1-2 Standardized tests and measures addressing body structure, function, activity limitation and / or participation in recreation  Presentation: LOW Complexity : Stable, uncomplicated  Clinical Decision Making:Low Complexity   Overall Complexity:LOW SUBJECTIVE:  
Patient stated I was taking care of everything else so I sort of forgot about it but I worried about that a little. I didn't want it to be anything bad.  OBJECTIVE DATA SUMMARY:  
 
Past Medical History:  
Diagnosis Date  Asthma  Bipolar disorder, unspecified ([facility] 3/1/2019  Cocaine use disorder, severe, dependence ([facility] No past surgical history on file. Barriers to Learning/Limitations: None Compensate with: N/A Prior Level of Function/Home Situation: Independent Home Situation Home Environment: Private residence # Steps to Enter: 2 One/Two Story Residence: One story Living Alone: No 
Support Systems: Spouse/Significant Other/Partner Patient Expects to be Discharged to[de-identified] Private residence Current DME Used/Available at Home: NoneCritical Behavior: A&Ox4 Strength:   
Strength: Generally decreased, functional(slight diminished  on left due to pain) Tone & Sensation:  
Tone: Normal(BLE/BUE) Sensation: Intact(c/o tingling in left palm) Range Of Motion: 
AROM: Within functional limits(BLE/BUE) Functional Mobility: 
Bed Mobility: 
 Scooting: Independent Transfers: 
Sit to Stand: Independent Stand to Sit: Independent Balance:  
Sitting: Intact Standing: IntactAmbulation/Gait Training: 
 patient ambulating independently around unit Pain: 
Pt reports 4/10 pain or discomfort prior to treatment.   (left hand/palm and wrist) Pt reports 4/10 pain or discomfort post treatment. Activity Tolerance:  
Patient tolerated well and demos good carryover of learning. Please refer to the flowsheet for vital signs taken during this treatment. After treatment:  
[x]         Patient left in no apparent distress sitting up in chair in common area 
[]         Patient left in no apparent distress in bed 
[x]         Call bell left within reach 
[]         Nursing notified 
[]         Caregiver present 
[]         Bed alarm activated 
[]         SCDs applied to B LE 
 
COMMUNICATION/EDUCATION:  
[x]         Fall prevention education was provided and the patient/caregiver indicated understanding. [x]         Patient/family have participated as able in goal setting and plan of care. [x]         Patient/family agree to work toward stated goals and plan of care. []         Patient understands intent and goals of therapy, but is neutral about his/her participation. []         Patient is unable to participate in goal setting and plan of care. Thank you for this referral. 
Lashaun Harrison, PT Time Calculation: 15 mins

## 2019-03-04 NOTE — BSMART NOTE
Pt. is a 37year old male with history of Depression and Cocaine Abuse . Pt. was admitted to this facility for ideations to harm self. Pt.s case was discussed in treatment team this am. 
 
SOL Contact:  SW met with pt. To discuss this admission. Pt. States he is interested in going to an  IOP program . Pt states he rather go to an IOP because he has to work pay bills and be with his family. SW discussed IOP options. Pt. States he would prefer to attend an IOP in [facility] because he can attend after work. SOL discussed IOP program at [facility]. SW informed pt. About additional meetings he can attend: both NA And SMART Recovery Tool  meetings are available her at [facility]. Pt. Expressed interest regarding SSDI. SOL explained the process for pt to apply for SSDI. Pt. Is alert, cooperative and SW will continued to assist the pt with dc planing.

## 2019-03-04 NOTE — BH NOTES
Rubina Zavaleta is participating in Recreational Therapy. Group time: 30 minutes Personal goal for participation: fresh air break/playing games Goal orientation: social 
 
Group therapy participation: active Therapeutic interventions reviewed and discussed: Staff encouraged Pt to participate in group Impression of participation: Pt. Participated in playing games and walking outside and playing basketball with peers

## 2019-03-04 NOTE — PROGRESS NOTES
Problem: Depressed Mood (Adult/Pediatric) Goal: *STG: Attends activities and groups Pt will participate in at least 2 groups per shift. Outcome: Progressing Towards Goal 
aeb pt present at all groups this shift Goal: *STG: Remains safe in hospital 
Pt will be free from harm each shift Outcome: Progressing Towards Goal 
aeb pt free of harm this shift Goal: *STG: Complies with medication therapy Pt will take all medication as prescribed each shift. Outcome: Progressing Towards Goal 
aeb pt taking all medications as prescribed Pt has been pleasant and cooperative. Pt appropriate with peers. Pt denies SI/HI/AVH and is able to contract for safety at this time. Pt present at all groups and ate all meals. Will continue to monitor and provide intervention as necessary.

## 2019-03-04 NOTE — BSMART NOTE
SOCIAL WORK GROUP THERAPY PROGRESS NOTE Group Time:  11am 
 
Group Topic:  Coping Skills    C D Issues Group Participation:  . Pt moderately involved during group discussion but remained attentive. Part of session was on the variety of \"normal\" physical & emotional recovery symptoms that can possibly be experienced for several months and even up to (2) yrs. Pt also able to identify several strengths. Is considering possible IOP at [facility]. Goldy Quintanilla

## 2019-03-04 NOTE — BSMART NOTE
ART THERAPY GROUP PROGRESS NOTE PATIENT SCHEDULED FOR GROUP AT: 4414 ATTENDANCE: Full PARTICIPATION LEVEL: Participates fully in the art process ATTENTION LEVEL: Able to focus on task FOCUS: Anxiety reduction SYMBOLIC & THEMATIC CONTENT AS NOTED IN IMAGERY: He was calm, compliant, and invested in the task. He participated in group discussion and shared his need to work on his relationship with his wife.

## 2019-03-04 NOTE — BH NOTES
Pt has been very pleasant no behavioral issues. Pt has ate all meals including snacks. Pt wife and children came to visit him and he appears happy to see them. Pt denies SI during this shift. Staff will continue to monitor safety and behavior.

## 2019-03-04 NOTE — PROGRESS NOTES
NUTRITION Nutrition Consult: Diet Education RECOMMENDATIONS / PLAN:  
 
- Add double portions to pt's diet order. 
- Continue RD inpatient monitoring and evaluation. NUTRITION DIAGNOSIS & INTERVENTIONS:  
 
[x] Meals/snacks: modify composition Nutrition Diagnosis: Inadequate energy intake related to current diet order in relation to pt's estimated energy needs as evidenced by pt receiving 2300 kcal/day compared to estimated energy needs of 6873-1433 kcal/day. ASSESSMENT:  
 
Pt self-presented to ED with SI and relapse using crack cocaine again. Consult received for diet education however noted in MD's note pt is requesting double portions. Upon approaching this writer and introduction pt states \"I don't need to speak with you, i'm leaving tomorrow. \" After further attempt pt still refusing to speak with writer. Pt not appropriate for diet education at this time. Good meal intake and appetite per nursing. Tolerating diet. Average intake adequate to meet patients estimated nutritional needs:   [] Yes     [x] No      [] Unable to determine at this time Diet: DIET REGULAR Food Allergies: NKFA Current Appetite:   [x] Good per nursing     [] Fair     [] Poor     [] Other: 
Appetite/meal intake prior to admission:   [] Good     [] Fair     [] Poor     [x] Other: unknown Feeding Limitations:  [] Swallowing Difficulty       [] Chewing Difficulty       [] Other Current Meal Intake: No data found. Gastrointestinal Issues:  [] Yes    [x] No; none known Skin Integrity:  WDL Pertinent Medications:  Reviewed: pepcid, remeron, MVI w/ iron Labs:  Reviewed Anthropometrics: 
Ht Readings from Last 1 Encounters:  
03/01/19 6' 2\" (1.88 m) Last 3 Recorded Weights in this Encounter 03/01/19 1130 Weight: 99.8 kg (220 lb) Body mass index is 28.25 kg/m². Weight History: Fluctuations noted in weight hx PTA per chart. Weight Metrics 3/1/2019 12/5/2018 10/21/2018 2/5/2017 Weight 220 lb 230 lb 224 lb 260 lb BMI 28.25 kg/m2 29.53 kg/m2 28.76 kg/m2 33.38 kg/m2 Admitting Diagnosis: Bipolar disorder, unspecified ([facility] [F31.9] Past Medical History:  
Diagnosis Date  Asthma  Bipolar disorder, unspecified ([facility] 3/1/2019  Cocaine use disorder, severe, dependence ([facility] Education Needs:        [x] None identified (pt refused)  [] Identified - Not appropriate at this time  []  Identified and addressed - refer to education log Learning Limitations:   [x] None identified  [] Identified Cultural, Mandaen & ethnic food preferences identified:  [x] None    [] Yes ESTIMATED NUTRITION NEEDS:  
 
0753-7783 kcal (MSJx1.2-1.5),  gm protein (0.8-1 gm/kg), 1 mL/kcal 
Based on: 100 kg       [x] Actual BW      [] IBW MONITORING & EVALUATION:  
 
Nutrition Goal(s): 1. Po intake of meals will meet >75% of patient estimated nutritional needs within the next 7 days. Outcome:   [] Met    []  Not Met   [x] New/Initial Goal 
 
Monitor:  [x] Food and beverage intake   [x] Diet order   [x] Nutrition-focused physical findings   [] Weight Previous Recommendations (for follow-up assessments only):     []   Implemented       []   Not Implemented (RD to address)   [] No Longer Appropriate   [] No Recommendation Made Discharge Planning: regular diet [x]  Participated in care planning, discharge planning, & interdisciplinary rounds as appropriate Hellen Butt RD Pager: [phone]

## 2019-03-04 NOTE — PROGRESS NOTES
Behavioral Health Progress Note Admit Date: 3/1/2019 Hospital day 3 Vitals :  
Patient Vitals for the past 8 hrs: 
 BP Temp Pulse Resp  
03/04/19 0947 149/86 96.9 °F (36.1 °C) 67 20 Labs:   
Recent Results (from the past 24 hour(s)) VALPROIC ACID Collection Time: 03/04/19  7:25 AM  
Result Value Ref Range Valproic acid 54 50 - 100 ug/ml Meds:  
Current Facility-Administered Medications Medication Dose Route Frequency  famotidine (PEPCID) tablet 10 mg  10 mg Oral BID  
 haloperidol (HALDOL) tablet 5 mg  5 mg Oral Q4H PRN  
 haloperidol lactate (HALDOL) injection 5 mg  5 mg IntraMUSCular Q4H PRN  
 LORazepam (ATIVAN) injection 1-2 mg  1-2 mg IntraMUSCular Q4H PRN  
 traZODone (DESYREL) tablet 50 mg  50 mg Oral QHS PRN  
 ibuprofen (MOTRIN) tablet 400 mg  400 mg Oral Q4H PRN  
 hydrOXYzine pamoate (VISTARIL) capsule 50 mg  50 mg Oral Q4H PRN  
 divalproex DR (DEPAKOTE) tablet 500 mg  500 mg Oral BID  mirtazapine (REMERON) tablet 15 mg  15 mg Oral QHS  nicotine (NICORETTE) gum 2 mg  2 mg Oral Q1H PRN  
 multivitamin, tx-iron-ca-min (THERA-M w/ IRON) tablet 1 Tab  1 Tab Oral DAILY Hospital Problems: Principal Problem: 
  Severe depressed bipolar II disorder without psychotic features ([facility] (3/1/2019) Subjective:  
Medication side effects: none 
none Complains of L wrist and hand pain, may be carpal tunnel. He is L handed and does a great deal of work w/ L hand. Suggest he see his PCP. Does have order for Motrin PRN. Mood is improved , lessened suicidal ideas. Mental Status Exam 
Sensorium: alert Orientation: oriented to time, place, person and situation Relations: cooperative Eye Contact: appropriate Appearance: shows no evidence of impairment Thought Process: normal rate of thoughts and fair abstract reasoning/computation Thought Content: no evidence of impairment Suicidal: denies Homicidal: none Mood: is anxious Affect: stable Memory: shows no evidence of impairment Concentration: intact Abstraction: abstract Insight: The patient's insight shows no evidence of impairment OR Fair Judgement: shows no evidence of impairment OR  Fair Assessment/Plan:  
improved Continue close observation, SW discussed w/ him possibility of residential treatment. Says probably cannot since needs the money from working for his family. Cannot leave state because of legal situation. BP has been up again, was on med in the past, may have been clonidine. Will write for clonidine  0.1 mg bid, can be stopped if BP normalizes. Hold if systolic BP < 318. Continue Depakote, Remeron.

## 2019-03-04 NOTE — BH NOTES
Pt appeared to have slept for 6.50+ hours. Pt appears to be sleeping at this time. Will continue to monitor for safety.

## 2019-03-05 VITALS
TEMPERATURE: 96.9 F | RESPIRATION RATE: 20 BRPM | BODY MASS INDEX: 28.23 KG/M2 | HEIGHT: 74 IN | OXYGEN SATURATION: 98 % | HEART RATE: 98 BPM | WEIGHT: 220 LBS | DIASTOLIC BLOOD PRESSURE: 76 MMHG | SYSTOLIC BLOOD PRESSURE: 132 MMHG

## 2019-03-05 PROCEDURE — 74011250637 HC RX REV CODE- 250/637: Performed by: PSYCHIATRY & NEUROLOGY

## 2019-03-05 RX ORDER — FAMOTIDINE 10 MG/1
10 TABLET ORAL 2 TIMES DAILY
Qty: 60 TAB | Refills: 2 | Status: SHIPPED | OUTPATIENT
Start: 2019-03-05 | End: 2019-08-22

## 2019-03-05 RX ORDER — MIRTAZAPINE 15 MG/1
15 TABLET, FILM COATED ORAL
Qty: 30 TAB | Refills: 0 | Status: SHIPPED | OUTPATIENT
Start: 2019-03-05 | End: 2019-08-22

## 2019-03-05 RX ORDER — CLONIDINE HYDROCHLORIDE 0.1 MG/1
0.1 TABLET ORAL 2 TIMES DAILY
Qty: 60 TAB | Refills: 0 | Status: SHIPPED | OUTPATIENT
Start: 2019-03-05 | End: 2019-08-22

## 2019-03-05 RX ORDER — DIVALPROEX SODIUM 500 MG/1
500 TABLET, DELAYED RELEASE ORAL 2 TIMES DAILY
Qty: 60 TAB | Refills: 0 | Status: SHIPPED | OUTPATIENT
Start: 2019-03-05 | End: 2019-08-22

## 2019-03-05 RX ADMIN — FAMOTIDINE 10 MG: 20 TABLET ORAL at 09:07

## 2019-03-05 RX ADMIN — CLONIDINE HYDROCHLORIDE 0.1 MG: 0.1 TABLET ORAL at 01:32

## 2019-03-05 RX ADMIN — DIVALPROEX SODIUM 500 MG: 250 TABLET, DELAYED RELEASE ORAL at 09:06

## 2019-03-05 RX ADMIN — MULTIPLE VITAMINS W/ MINERALS TAB 1 TABLET: TAB at 09:06

## 2019-03-05 NOTE — BSMART NOTE
Pt. is a 37year old male with history of Depression and Cocaine Abuse . Pt. was admitted to this facility for ideations to harm self.       Pt. s case was discussed in treatment team this am. Pt. Will be dc today. Pt.Will follow up [facility] for 3/7/19 @ 5:30 p.m., medication appointment with Dr. Dasha Figueredo on 3/19/19 @ 5:30 pm. And 4/1/19 @ 11:00 a.m  Afshin calabrese at [facility] and [address]   [phone].       SOL Contact:  SW met with pt. To discuss dc. SW encouraged continue relapse prevention , positive coping skills and aftercare. SW provided pt with information on SMART  [employer] recovery meetings, NA meetings and [place]. Pt. Denied ideations and hallucinations. Pt. Was picked up by his spouse. SOL explained the above dc appointments to both pt. And his spouse.

## 2019-03-05 NOTE — DISCHARGE INSTRUCTIONS
BEHAVIORAL HEALTH NURSING DISCHARGE NOTE      The following personal items collected during your admission are returned to you:   Dental Appliance: Dental Appliances: None  Vision: Visual Aid: None  Hearing Aid:    Jewelry: Jewelry: None  Clothing: Clothing: None  Other Valuables: Other Valuables: None  Valuables sent to safe:        PATIENT INSTRUCTIONS:    Your health and safety is very important to us; we remind you to commit to safety and recovery. Should you have any thoughts of harming yourself or others please dial 911, utilize your support systems, the coping strategies you have learned as well as the [address] [place] [address] at [phone] or visit their website at https://suicidepreventionlifeline.org/    The following are some additional coping strategies that you can utilize if you start to feel anxious, angry, stressed or depressed    1. Exercise (running, walking, etc.). 2. Write (poetry, stories, journal). 3. Be with other people. 4. Watch a favorite TV show. 5. Practice Mindfulness  6. Watch a funny/favorite movie  7. Do some deep breathing  8. Take a hot shower or relaxing bath. 9. Play with a pet. 10. Help others  11. Read a good book. 12. Listen to music. 13. Try some aromatherapy (candle, lotion, room spray). 14. Meditate. 15. Paint or draw. 16. Rip paper into itty-bitty pieces. 17. Shoot hoops, kick a ball. 18. Hug a pillow or stuffed animal.  19. Dance. 20. Take up a new hobby. 21. Morgan. 25. Make a list of blessings in your life. 23. Contact a hotline/ your therapist.  24. Talk to someone close to you. 25. Yoga. 32. Anjel Hey a friend or family member. 32. Make a list of goals for the week/month/year/5 years. The discharge information has been reviewed with the patient. The patient verbalized understanding.       Patient armband removed and shredded      ***IMPORTANT NUMBERS***        [facility]        [phone]    [facility]       [phone]    Suicide Prevention     [phone]

## 2019-03-06 NOTE — DISCHARGE SUMMARY
[facility] DISCHARGE    Name:  Romelia Mott  MR#:   [id number]  :  1980  ACCOUNT #:  [de-identified]  ADMIT DATE:  2019  DISCHARGE DATE:  2019    IDENTIFYING DATA:  The patient presents as a 78-year-old , black male who lives with wife and 80-year-old daughter. He is covered by [employer]. He works as a  for the [employer]. He was admitted after self-presentation to emergency room with suicidal ideas and relapsing to crack cocaine. He had past history of being on Depakote, Remeron, Celexa, and p.r.n. Vistaril while he was in prison. He was admitted to the hospital for relapse. He had a history of going to [place] for [place] days in . HOSPITAL COURSE:  The patient was admitted to the Locked Adult Unit where he was afforded individual group and milieu therapies. While in the hospital, he was treated with clonidine 0.1 mg twice a day for high blood pressure, Depakote  mg b.i.d., Pepcid 10 mg daily, several doses of ibuprofen 400 mg for pain, mirtazapine 15 mg at bedtime, multiple vitamins one daily, Nicorette gum as needed for five doses for nicotine withdrawal.  His vital signs had improved with blood pressure 132/76. He was denying withdrawal.  He denied homicidal or suicidal ideas. Mood had improved in the structure of the hospital with medications. He said he was not having severe mood swings at this point. He was denying hallucinatory or delusional material or significant medication side effects. He did wish to be discharged to home with outpatient followup. He was saying he needed to return to work. CONDITION ON DISCHARGE:  Fair. PROGNOSIS:  Fair. ASSESSMENT:  AXIS I:  Bipolar II disorder, most recent episode depressed, severe without psychosis. Cocaine use disorder, severe. Nicotine use disorder, severe. AXIS II:  None. AXIS III:  Chronic bilateral foot pain secondary to bunions.   Chronic left hip pain secondary to trauma. DISPOSITION:  Discharged to self. FOLLOWUPS:  Follow up with Dr. Aaliyah Small LCSW at [facility] on 03/07/2019 at 1700. Follow up with Scott Mayer MD same facility on 03/19/2019 at [phone], and then follow up with Edith Burk nurse practitioner on 04/01/2019 at 1100. Follow up with own primary care provider for high blood pressure and chronic foot pain. MEDICATIONS:  1. Clonidine 0.1 mg b.i.d., #60.  2.  Depakote  mg b.i.d., #60 one refill. 3.  Famotidine 10 mg b.i.d., #60.  4.  Mirtazapine 15 mg at bedtime, #30, one refill. 5.  Multiple vitamins one daily.         Enrique Thomas MD      [id number]  D:  03/05/2019 10:26  T:  03/06/2019 0:18  JOB #:  [id number]

## 2019-08-18 ENCOUNTER — HOSPITAL ENCOUNTER (INPATIENT)
Age: 39
LOS: 3 days | Discharge: HOME | DRG: 753 | End: 2019-08-22
Attending: EMERGENCY MEDICINE | Admitting: PSYCHIATRY & NEUROLOGY
Payer: COMMERCIAL

## 2019-08-18 DIAGNOSIS — R45.851 SUICIDAL IDEATION: Primary | ICD-10-CM

## 2019-08-18 LAB
ALBUMIN SERPL-MCNC: 3.9 G/DL (ref 3.4–5)
ALBUMIN/GLOB SERPL: 1.2 {RATIO} (ref 0.8–1.7)
ALP SERPL-CCNC: 76 U/L (ref 45–117)
ALT SERPL-CCNC: 20 U/L (ref 16–61)
AMPHET UR QL SCN: NEGATIVE
ANION GAP SERPL CALC-SCNC: 3 MMOL/L (ref 3–18)
AST SERPL-CCNC: 15 U/L (ref 10–38)
BARBITURATES UR QL SCN: NEGATIVE
BASOPHILS # BLD: 0 K/UL (ref 0–0.1)
BASOPHILS NFR BLD: 0 % (ref 0–2)
BENZODIAZ UR QL: NEGATIVE
BILIRUB SERPL-MCNC: 0.6 MG/DL (ref 0.2–1)
BUN SERPL-MCNC: 9 MG/DL (ref 7–18)
BUN/CREAT SERPL: 8 (ref 12–20)
CALCIUM SERPL-MCNC: 9.5 MG/DL (ref 8.5–10.1)
CANNABINOIDS UR QL SCN: NEGATIVE
CHLORIDE SERPL-SCNC: 103 MMOL/L (ref 100–111)
CO2 SERPL-SCNC: 35 MMOL/L (ref 21–32)
COCAINE UR QL SCN: POSITIVE
CREAT SERPL-MCNC: 1.2 MG/DL (ref 0.6–1.3)
DIFFERENTIAL METHOD BLD: ABNORMAL
EOSINOPHIL # BLD: 0.3 K/UL (ref 0–0.4)
EOSINOPHIL NFR BLD: 4 % (ref 0–5)
ERYTHROCYTE [DISTWIDTH] IN BLOOD BY AUTOMATED COUNT: 12.8 % (ref 11.6–14.5)
ETHANOL SERPL-MCNC: <3 MG/DL (ref 0–3)
GLOBULIN SER CALC-MCNC: 3.2 G/DL (ref 2–4)
GLUCOSE SERPL-MCNC: 89 MG/DL (ref 74–99)
HCT VFR BLD AUTO: 45.8 % (ref 36–48)
HDSCOM,HDSCOM: ABNORMAL
HGB BLD-MCNC: 16.1 G/DL (ref 13–16)
LYMPHOCYTES # BLD: 2.5 K/UL (ref 0.9–3.6)
LYMPHOCYTES NFR BLD: 39 % (ref 21–52)
MCH RBC QN AUTO: 30.2 PG (ref 24–34)
MCHC RBC AUTO-ENTMCNC: 35.2 G/DL (ref 31–37)
MCV RBC AUTO: 85.9 FL (ref 74–97)
METHADONE UR QL: NEGATIVE
MONOCYTES # BLD: 0.5 K/UL (ref 0.05–1.2)
MONOCYTES NFR BLD: 8 % (ref 3–10)
NEUTS SEG # BLD: 3.1 K/UL (ref 1.8–8)
NEUTS SEG NFR BLD: 49 % (ref 40–73)
OPIATES UR QL: NEGATIVE
PCP UR QL: NEGATIVE
PLATELET # BLD AUTO: 262 K/UL (ref 135–420)
PMV BLD AUTO: 9.6 FL (ref 9.2–11.8)
POTASSIUM SERPL-SCNC: 4 MMOL/L (ref 3.5–5.5)
PROT SERPL-MCNC: 7.1 G/DL (ref 6.4–8.2)
RBC # BLD AUTO: 5.33 M/UL (ref 4.7–5.5)
SODIUM SERPL-SCNC: 141 MMOL/L (ref 136–145)
WBC # BLD AUTO: 6.3 K/UL (ref 4.6–13.2)

## 2019-08-18 PROCEDURE — 99284 EMERGENCY DEPT VISIT MOD MDM: CPT

## 2019-08-18 PROCEDURE — 80053 COMPREHEN METABOLIC PANEL: CPT

## 2019-08-18 PROCEDURE — 85025 COMPLETE CBC W/AUTO DIFF WBC: CPT

## 2019-08-18 PROCEDURE — 99285 EMERGENCY DEPT VISIT HI MDM: CPT

## 2019-08-18 PROCEDURE — 80307 DRUG TEST PRSMV CHEM ANLYZR: CPT

## 2019-08-18 NOTE — BSMART NOTE
Per Arnaldo Patel clinician, CSU prescreen evaluation has been delayed by the need of 3 community ECO's brought by police which take priority over voluntary screenings. Client will be seen as soon as possible.  given update. Client informed. States he needs socks, given red non-slip hospital socks. No other needs expressed.

## 2019-08-18 NOTE — ED TRIAGE NOTES
\"I need help. I use crack/cocaine. I just want to come off of it. \"  Last use this morning at approximately 0400.

## 2019-08-18 NOTE — ED PROVIDER NOTES
EMERGENCY DEPARTMENT HISTORY AND PHYSICAL EXAM    10:35 AM  Date: 8/18/2019  Patient Name: Deborah Rodriguez    History of Presenting Illness     Chief Complaint   Patient presents with    Drug Problem        History Provided By: Patient    HPI: Deborah Rodriguez is a 37 y.o. male with history of bipolar disorder and cocaine abuse. Patient is presenting to get help for his cocaine addiction. Last used cocaine at 5 AM this morning. Denies any chest pain or shortness of breath. Patient is concerned that if he keeps smoking cocaine then he would kill himself. Does not feel safe at home and feels like he needs to be admitted to the hospital to get help. Denies any other drug use or alcohol use. Location:  Severity:  Timing/course: Onset/Duration:     PCP: None    Past History     Past Medical History:  Past Medical History:   Diagnosis Date    Asthma     Bipolar disorder, unspecified ([facility] [address] 3/1/2019    Cocaine use disorder, severe, dependence ([facility]        Past Surgical History:  History reviewed. No pertinent surgical history. Family History:  History reviewed. No pertinent family history. Social History:  Social History     Tobacco Use    Smoking status: Current Every Day Smoker     Packs/day: 1.00    Smokeless tobacco: Never Used   Substance Use Topics    Alcohol use: Yes    Drug use: No       Allergies:  No Known Allergies    Review of Systems   Review of Systems   Psychiatric/Behavioral: Positive for behavioral problems and suicidal ideas. All other systems reviewed and are negative. Physical Exam     Patient Vitals for the past 12 hrs:   Temp Pulse Resp BP SpO2   08/18/19 1002 97.9 °F (36.6 °C) 73 12 138/85 99 %       Physical Exam   Constitutional: He is oriented to person, place, and time. He appears well-developed and well-nourished. HENT:   Head: Normocephalic and atraumatic. Eyes: Conjunctivae and EOM are normal.   Neck: Normal range of motion. Neck supple.    Cardiovascular: Normal rate. Pulmonary/Chest: Effort normal. No respiratory distress. Musculoskeletal: Normal range of motion. He exhibits no deformity. Neurological: He is alert and oriented to person, place, and time. Skin: Skin is warm and dry. Psychiatric: He has a normal mood and affect. His behavior is normal. Judgment normal. He expresses suicidal ideation. Diagnostic Study Results     Labs -  No results found for this or any previous visit (from the past 12 hour(s)). Radiologic Studies -   No results found. Medical Decision Making     ED Course: Progress Notes, Reevaluation, and Consults:    10:35 AM Initial assessment performed. The patients presenting problems have been discussed, and they/their family are in agreement with the care plan formulated and outlined with them. I have encouraged them to ask questions as they arise throughout their visit. 11:23 AM  Crisis team aware    Provider Notes (Medical Decision Making): 66-year-old male history of bipolar and cocaine abuse, patient is here today to get help with his addiction problems and concerns for suicidality. Is well-appearing on exam and has no other physical complaints. Will get screening labs then consult crisis team    Procedures:     Critical Care Time:     Vital Signs-Reviewed the patient's vital signs. Reviewed pt's pulse ox reading. EKG: Interpreted by the EP. Time Interpreted:    Rate:    Rhythm:    Interpretation:   Comparison:     Records Reviewed: Nursing Notes (Time of Review: 10:35 AM)  -I am the first provider for this patient.  -I reviewed the vital signs, available nursing notes, past medical history, past surgical history, family history and social history. Current Outpatient Medications   Medication Sig Dispense Refill    cloNIDine HCl (CATAPRES) 0.1 mg tablet Take 1 Tab by mouth two (2) times a day. 60 Tab 0    famotidine (PEPCID) 10 mg tablet Take 1 Tab by mouth two (2) times a day.  60 Tab 2    mirtazapine (REMERON) 15 mg tablet Take 1 Tab by mouth nightly. 30 Tab 0    divalproex DR (DEPAKOTE) 500 mg tablet Take 1 Tab by mouth two (2) times a day. 60 Tab 0        Clinical Impression     Clinical Impression: No diagnosis found. Disposition: Pending placement for CSU bed    This note was dictated utilizing voice recognition software which may lead to typographical errors. I apologize in advance if the situation occurs. If questions arise please do not hesitate to contact me or call our department.     Sharan Iglesias MD  10:35 AM

## 2019-08-18 NOTE — BSMART NOTE
36 yo M seen in ED room 11 at the request of . Alert & O x 4, cooperative, sad, somewhat hopeless, memory & judgement intact, insight fair. Has slept several hours in ED and eaten post crack cocaine use, last at 0400. Mostly unemployed - does some temp work, lost job as , lives with wife and grown daughter and fears also losing them R/T drug use. Denies current legal problems, hx of multiple incarcerations since age 21. Last released from group home 8/17/18. CC: Depression, suicidal thoughts, crack cocaine use    States he is \"losing his way again. \" Needs to be back on his medicine and get help or he knows he will kill himself. Previous attempts by overdose and drank bleach. States he does not feel safe on his own if he is not in a facility. Tearful earlier when trying to contact his wife. Fears he's destroying everything. States he is willing to do whatever needed to get back on track. Denies homicidal ideation. Denies A / V hallucinations. Denies alcohol use. Denies other drugs knowingly. States sometimes he thinks the crack \"is not totally cocaine. \"  BAL=negative, UDSC + cocaine only. Last treatment was at [facility] [place] March 2019. States he stayed clean about 2 weeks and did not go to IOP or medication appointment at [address] [place] [address] R/T transportation issues. Also been to [facility] he would go back to long-term treatment, will go to any facility to get stable on his medication and stop using. Voluntary for inpatient treatment. DISPOSITION: Discussed with . Referred to [facility] clinician for possible community resources, [facility] or [facility].

## 2019-08-18 NOTE — ED NOTES
Pt is very tearful. Wife is not answering the phone. Says that he continues to use crack cocaine and it is ruining his marriage. He reports having a serious problem but can not stop using. Blood and urine collected. sent to lab.

## 2019-08-18 NOTE — ED NOTES
Gave pt a meal tray. Waiting for crisis to come speak to pt. Pt is feeling suicidal. Pt is now finished eating and has returned back to sleep.

## 2019-08-18 NOTE — ED NOTES
6:59 PM :Pt care assumed from Dr. Yuliya Reynoso , ED provider. Pt complaint(s), current treatment plan, progression and available diagnostic results have been discussed thoroughly. Rounding occurred: yes  Intended Disposition: TBD   Pending diagnostic reports and/or labs (please list): awaiting [facility] eval    12:05 AM  Patient was seen by [facility], but actually has insurance so was referred back to crisis for admission. 7AM : Pt care transferred to Dr. Wendy Pat  ,ED provider. History of patient complaint(s), available diagnostic reports and current treatment plan has been discussed thoroughly. Bedside rounding on patient occured : yes . Intended disposition of patient : TBD  Pending diagnostics reports and/or labs (please list): Awaiting bed placement.

## 2019-08-18 NOTE — ED NOTES
Gave pt a dinner tray and milk. Pt believes that he has an ulcer and needs to be evaluated. Crisis has came and seen pt. Will continue to monitor.

## 2019-08-19 PROBLEM — F31.81 BIPOLAR 2 DISORDER (HCC): Status: ACTIVE | Noted: 2019-08-19

## 2019-08-19 PROCEDURE — 74011250637 HC RX REV CODE- 250/637: Performed by: PSYCHIATRY & NEUROLOGY

## 2019-08-19 PROCEDURE — 65220000003 HC RM SEMIPRIVATE PSYCH

## 2019-08-19 PROCEDURE — 74011250637 HC RX REV CODE- 250/637: Performed by: EMERGENCY MEDICINE

## 2019-08-19 RX ORDER — HALOPERIDOL 5 MG/ML
5 INJECTION INTRAMUSCULAR
Status: DISCONTINUED | OUTPATIENT
Start: 2019-08-19 | End: 2019-08-22 | Stop reason: HOSPADM

## 2019-08-19 RX ORDER — HALOPERIDOL 5 MG/1
5 TABLET ORAL
Status: DISCONTINUED | OUTPATIENT
Start: 2019-08-19 | End: 2019-08-22 | Stop reason: HOSPADM

## 2019-08-19 RX ORDER — HYDROXYZINE PAMOATE 50 MG/1
50 CAPSULE ORAL
Status: DISCONTINUED | OUTPATIENT
Start: 2019-08-19 | End: 2019-08-22 | Stop reason: HOSPADM

## 2019-08-19 RX ORDER — TRAZODONE HYDROCHLORIDE 50 MG/1
50 TABLET ORAL
Status: DISCONTINUED | OUTPATIENT
Start: 2019-08-19 | End: 2019-08-22 | Stop reason: HOSPADM

## 2019-08-19 RX ORDER — FAMOTIDINE 20 MG/1
20 TABLET, FILM COATED ORAL 2 TIMES DAILY
Status: DISCONTINUED | OUTPATIENT
Start: 2019-08-19 | End: 2019-08-22 | Stop reason: HOSPADM

## 2019-08-19 RX ORDER — CLONIDINE HYDROCHLORIDE 0.1 MG/1
0.1 TABLET ORAL 2 TIMES DAILY
Status: DISCONTINUED | OUTPATIENT
Start: 2019-08-19 | End: 2019-08-22 | Stop reason: HOSPADM

## 2019-08-19 RX ORDER — ACETAMINOPHEN 500 MG
2 TABLET ORAL
Status: DISCONTINUED | OUTPATIENT
Start: 2019-08-19 | End: 2019-08-22 | Stop reason: HOSPADM

## 2019-08-19 RX ADMIN — TRAZODONE HYDROCHLORIDE 50 MG: 50 TABLET ORAL at 20:27

## 2019-08-19 RX ADMIN — NICOTINE POLACRILEX 2 MG: 2 GUM, CHEWING ORAL at 20:43

## 2019-08-19 RX ADMIN — CLONIDINE HYDROCHLORIDE 0.1 MG: 0.1 TABLET ORAL at 20:26

## 2019-08-19 RX ADMIN — FAMOTIDINE 20 MG: 20 TABLET ORAL at 20:26

## 2019-08-19 RX ADMIN — ALUMINUM HYDROXIDE AND MAGNESIUM HYDROXIDE 15 ML: 200; 200 SUSPENSION ORAL at 13:08

## 2019-08-19 NOTE — ED NOTES
7 AM patient turned over to me Dr Holly [place] he is a 40-year-old gentleman presents with suicidal ideation. Currently pending placement likely here. No complaints presently. General; AOX3. Pulmonary; CTA-B. Cardiac: RRR no MRG; Abd S/NT/ND.  Plan:  Admit

## 2019-08-19 NOTE — BH NOTES
Patient received on unit via wheelchair accompanied by hospital security and ED staff. Cooperative and pleasant with dull affect. Reports feeling depressed over substance use (crack cocaine) and of \"losing everything\". Voices interest in treatment program. Patient states he had thoughts of suicide \"earlier\", but none at this time. Contracts firmly to harm to self or others. Denies HI/AH/VH. States he has abdominal pain with \"withdrawal\". Oriented to unit policies and procedures. Ate 30% of dinner meal and went to bed.

## 2019-08-19 NOTE — ED NOTES
TRANSFER - OUT REPORT:    Verbal report given to Valentine(name) on Socorro Larry  being transferred to Behavior Adult(unit) for routine progression of care       Report consisted of patients Situation, Background, Assessment and   Recommendations(SBAR). Information from the following report(s) SBAR and Kardex was reviewed with the receiving nurse. Lines:       Opportunity for questions and clarification was provided.       Patient transported with:   [employer]

## 2019-08-19 NOTE — ED NOTES
Patient in bed resting with lights off and in stable condition. No signs of distress. Will continue to monitor.

## 2019-08-19 NOTE — ED NOTES
Patient will be admitted. Waiting for bed assignment. Patient in bed resting with eyes closed and in stable condition. No signs of distress.  Will continue to monitor

## 2019-08-19 NOTE — BSMART NOTE
Reassessed Mr Candi Rao in ED room 11 at the request of Dr Myron Contreras. Pt still endorsing s/i with no specific plan. Denies h/i a/vh. Reports crack cocaine abuse and wanting help with this. Denies alcohol use. Denies current legal issues. Has support in the home. Pt meets inpatient criteria but [facility] has no psychiatric beds available at this time. Bed search will begin.

## 2019-08-20 PROBLEM — F31.5 BIPOLAR I DISORDER, CURRENT OR MOST RECENT EPISODE DEPRESSED, WITH PSYCHOTIC FEATURES (HCC): Status: ACTIVE | Noted: 2019-08-20

## 2019-08-20 PROCEDURE — 74011250637 HC RX REV CODE- 250/637: Performed by: PSYCHIATRY & NEUROLOGY

## 2019-08-20 PROCEDURE — 65220000003 HC RM SEMIPRIVATE PSYCH

## 2019-08-20 RX ORDER — OLANZAPINE 2.5 MG/1
2.5 TABLET ORAL EVERY EVENING
Status: DISCONTINUED | OUTPATIENT
Start: 2019-08-20 | End: 2019-08-22 | Stop reason: HOSPADM

## 2019-08-20 RX ORDER — DIVALPROEX SODIUM 250 MG/1
500 TABLET, DELAYED RELEASE ORAL 2 TIMES DAILY
Status: DISCONTINUED | OUTPATIENT
Start: 2019-08-20 | End: 2019-08-22 | Stop reason: HOSPADM

## 2019-08-20 RX ORDER — CYCLOBENZAPRINE HCL 5 MG
5 TABLET ORAL
Status: DISCONTINUED | OUTPATIENT
Start: 2019-08-20 | End: 2019-08-22 | Stop reason: HOSPADM

## 2019-08-20 RX ADMIN — FAMOTIDINE 20 MG: 20 TABLET ORAL at 08:35

## 2019-08-20 RX ADMIN — DIVALPROEX SODIUM 500 MG: 250 TABLET, DELAYED RELEASE ORAL at 20:25

## 2019-08-20 RX ADMIN — CLONIDINE HYDROCHLORIDE 0.1 MG: 0.1 TABLET ORAL at 08:35

## 2019-08-20 RX ADMIN — DIVALPROEX SODIUM 500 MG: 250 TABLET, DELAYED RELEASE ORAL at 11:02

## 2019-08-20 RX ADMIN — NICOTINE POLACRILEX 2 MG: 2 GUM, CHEWING ORAL at 11:02

## 2019-08-20 RX ADMIN — FAMOTIDINE 20 MG: 20 TABLET ORAL at 20:25

## 2019-08-20 RX ADMIN — CLONIDINE HYDROCHLORIDE 0.1 MG: 0.1 TABLET ORAL at 20:25

## 2019-08-20 RX ADMIN — OLANZAPINE 2.5 MG: 2.5 TABLET, FILM COATED ORAL at 17:04

## 2019-08-20 NOTE — BH NOTES
ZA Note: The above pt has been in the day area majority of the shift. He has been entitled majority of the shift. He has been medication compliance this shift.

## 2019-08-20 NOTE — BSMART NOTE
ART THERAPY GROUP PROGRESS NOTE    Group time:1430    The patient refused group despite encouragement. He sat out in the day area during the entire group, however was not responsive when encouraged to join.

## 2019-08-20 NOTE — PROGRESS NOTES
Problem: Suicide/Homicide (Adult/Pediatric)  Goal: *STG: Remains safe in hospital  Description  Patient will not harm himself or others while in hospitalized. Outcome: Progressing Towards Goal  Goal: *STG: Attends activities and groups  Description  Patient will attend at least one to two groups daily while hospitalized. Outcome: Progressing Towards Goal  Goal: *STG/LTG: Complies with medication therapy  Description  Patient will comply with medications daily as prescribed by doctor. Outcome: Progressing Towards Goal    Pt interacts well with peers and staff. Pt appears jovial on the unit. Pt is not verbalizing current SI. Pt can be demanding at times. Rounds maintained Q 15 mins.  Staff will continue to offer a safe and supportive environment

## 2019-08-20 NOTE — BSMART NOTE
SOL assessment/Intervention:  Stacey Nuñez is a 37 y.o. male with history of bipolar disorder and cocaine abuse. Patient is presenting to get help for his cocaine addiction  Patient is observed engaged in the milieu with peers. Patient reports the need for assistance with his addiction to crack cocaine. Patient reports he has had this addiction since the age of 32. He reports he has been clean for a period of 4 years when he was in prison. He reports his addiction started right after prison. Patient reports he is on probation and has several upcoming court cases in September. Patient reports he is interested in long term treatment however, he is uninsured and has applied for medicaid services. Patient reports he is interested in disability services and was provided with necessary information. SW encouraged patient to attend group activities to address thoughts and feelings. SW will continue to monitor patient as needed.     Kenna Naik LCSW-E
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
## 2019-08-20 NOTE — BSMART NOTE
OCCUPATIONAL THERAPY PROGRESS NOTE    Group time:1530  Declined to sit at group table, sat nearby interacting with a peer during much of group. Did take group materials and participate superficially when peers were at end of group.

## 2019-08-21 LAB
CHOLEST SERPL-MCNC: 148 MG/DL
HBA1C MFR BLD: 5.3 % (ref 4.2–5.6)
HDLC SERPL-MCNC: 60 MG/DL (ref 40–60)
HDLC SERPL: 2.5 {RATIO} (ref 0–5)
LDLC SERPL CALC-MCNC: 79.4 MG/DL (ref 0–100)
LIPID PROFILE,FLP: NORMAL
TRIGL SERPL-MCNC: 43 MG/DL (ref ?–150)
TSH SERPL DL<=0.05 MIU/L-ACNC: 3.94 UIU/ML (ref 0.36–3.74)
VLDLC SERPL CALC-MCNC: 8.6 MG/DL

## 2019-08-21 PROCEDURE — 84443 ASSAY THYROID STIM HORMONE: CPT

## 2019-08-21 PROCEDURE — 36415 COLL VENOUS BLD VENIPUNCTURE: CPT

## 2019-08-21 PROCEDURE — 65220000003 HC RM SEMIPRIVATE PSYCH

## 2019-08-21 PROCEDURE — 83036 HEMOGLOBIN GLYCOSYLATED A1C: CPT

## 2019-08-21 PROCEDURE — 80061 LIPID PANEL: CPT

## 2019-08-21 PROCEDURE — 74011250637 HC RX REV CODE- 250/637: Performed by: PSYCHIATRY & NEUROLOGY

## 2019-08-21 RX ADMIN — CLONIDINE HYDROCHLORIDE 0.1 MG: 0.1 TABLET ORAL at 21:32

## 2019-08-21 RX ADMIN — DIVALPROEX SODIUM 500 MG: 250 TABLET, DELAYED RELEASE ORAL at 21:32

## 2019-08-21 RX ADMIN — FAMOTIDINE 20 MG: 20 TABLET ORAL at 08:17

## 2019-08-21 RX ADMIN — CLONIDINE HYDROCHLORIDE 0.1 MG: 0.1 TABLET ORAL at 08:16

## 2019-08-21 RX ADMIN — TRAZODONE HYDROCHLORIDE 50 MG: 50 TABLET ORAL at 21:41

## 2019-08-21 RX ADMIN — OLANZAPINE 2.5 MG: 2.5 TABLET, FILM COATED ORAL at 19:21

## 2019-08-21 RX ADMIN — FAMOTIDINE 20 MG: 20 TABLET ORAL at 21:32

## 2019-08-21 RX ADMIN — HYDROXYZINE PAMOATE 50 MG: 50 CAPSULE ORAL at 04:00

## 2019-08-21 RX ADMIN — DIVALPROEX SODIUM 500 MG: 250 TABLET, DELAYED RELEASE ORAL at 08:16

## 2019-08-21 NOTE — BSMART NOTE
OCCUPATIONAL THERAPY PROGRESS NOTE  Group Time:  1381  Attendance: The patient attended full group. Participation:  The patient participated fully in the activity. Attention:  The patient was able to focus on the activity. Interaction:  The patient frequently interacts with others. Not as superficial today, still bright in affect and interacts with peers often.

## 2019-08-21 NOTE — PROGRESS NOTES
Problem: Suicide/Homicide (Adult/Pediatric)  Goal: *STG: Remains safe in hospital  Description  Patient will not harm himself or others while in hospitalized. Outcome: Progressing Towards Goal  Note:   Remains safe. Problem: Suicide/Homicide (Adult/Pediatric)  Goal: *STG/LTG: Complies with medication therapy  Description  Patient will comply with medications daily as prescribed by doctor. Outcome: Progressing Towards Goal  Note:   Takes medication as prescribed. Patient feels baffled because he keeps waking at the same time almost each day, \"I didn't wake up like this yesterday. I don't know why I do this. \"  Patient was medicated for anxiety and given snacks. Patient continuously go to the refrigerator. Patient denied depression/SI/HI or AVH at current. Will continue with rounds and  support as needed.

## 2019-08-21 NOTE — BH NOTES
Treatment team met -     Medical Director:                                _x____present        Psychiatrist:                                        _x____present      Charge nurse:                                     _x____present           MSW:                                                __x___present      :                      _x____present      Nurse Manager:                                  _____present      Student RNs:                                      _____present      Medical Students:                               _x____present      Art Therapist:                                      _x____present   Clinical Coordinator:                           _x____present   Internal :                      x_____present        Plan of care discussed and updated as appropriate.

## 2019-08-21 NOTE — BH NOTES
Patient reported feeling anxious/restless. PRN medication offered and provided. Patient was provided with snack. All other needs assessed. Will continue to monitor and support as needed.

## 2019-08-21 NOTE — BSMART NOTE
SW assessment/Intervention:  Patient is observed engaged in the milieu. Patient continues to report feelings of depression and sadness. Patient currently denies SI/HI. Patient denies AVH. He reports he understands his addiction and will attend NA upon discharge. Patient is focused on treatment and has attended group activity facilitated by this writer and participated appropriately. SW addressed patients lack of insurance and the barrier for long term treatment. SW will provide patient with NA/AA listing upon discharge. SW will continue to assist as needed.     ELEONORA Ta

## 2019-08-21 NOTE — PROGRESS NOTES
The pt was seen individually and his case was discussed in Tx Team. During session today, he became more \"honest\" regarding the symptoms that he is \"actually having,\" which included his being labile and depressed, but not psychotic. The pt described his coming to the hospital as a way to not to use cocaine, and his giving self and his wife some \"respit. \"   On the positive side, he does feel better, and indicated that possibly he will be ready for discharge tomorrow. 24-Hr Vitals/Weight (last day)     Date/Time Temp Pulse BP MAP (Calculated) BP 1 Location BP Patient Position Resp SpO2 O2 Device O2 Flow Rate (L/min) Level of Consciousness MEWS Score Weight   08/21/19 1034 97.4 °F (36.3 °C) 68 117/73 88 Right arm Sitting 18    Alert 1    08/20/19 0838 96 °F (35.6 °C) 73 130/80 97 Right arm At rest 18    Alert 1      Vitals are stable. Labs drawn today showed the following results:    Ismael Lew #[id number] (Acct: [de-identified]) (37 y.o. M) (Adm: 08/18/19)   [id number]   24-Hr All Comp Based Labs    (Last 24 hours)   Date/Time Component Value    08/21/19 0736 TSH 3.94  Details   08/21/19 0736 LIPID PROFILE  Details   08/21/19 0736 Cholesterol, total 148 Details   08/21/19 0736 Triglyceride 43 Details   08/21/19 0736 HDL Cholesterol 60 Details   08/21/19 0736 LDL, calculated 79.4 Details   08/21/19 0736 VLDL, calculated 8.6 Details   08/21/19 0736 CHOL/HDL Ratio 2.5 Details   08/21/19 0736 Hemoglobin A1c, (calculated) 5.3 Details     A mild elevation of his TSH noticed, however there is no evidence of clinical signs of hypothyroidism. Otherwise, a lipid panel and A1c were drawn because of the pt's current prescription for olanzapine. Will see again tomorrow and will discharge if stable. Olanzapine will be maintained to help with increasing his mood stability.

## 2019-08-21 NOTE — BSMART NOTE
ART THERAPY GROUP PROGRESS NOTE    PATIENT SCHEDULED FOR GROUP AT: 200    ATTENDANCE: Full    PARTICIPATION LEVEL: Participates fully in the art process    ATTENTION LEVEL: Able to focus on task    FOCUS: Problem-solving    SYMBOLIC & THEMATIC CONTENT AS NOTED IN IMAGERY: He presented with a cheerful mood. He was superficial in interaction with group and offered surfaced and general responses.

## 2019-08-21 NOTE — BH NOTES
The documentation for this period (0100-0400) is being entered following the guidelines as defined in the [facility] downtime policy by Del Thomas RN.

## 2019-08-22 VITALS
OXYGEN SATURATION: 99 % | HEIGHT: 74 IN | WEIGHT: 199 LBS | HEART RATE: 64 BPM | TEMPERATURE: 97.1 F | BODY MASS INDEX: 25.54 KG/M2 | DIASTOLIC BLOOD PRESSURE: 68 MMHG | RESPIRATION RATE: 18 BRPM | SYSTOLIC BLOOD PRESSURE: 100 MMHG

## 2019-08-22 PROCEDURE — 74011250637 HC RX REV CODE- 250/637: Performed by: PSYCHIATRY & NEUROLOGY

## 2019-08-22 RX ORDER — FAMOTIDINE 20 MG/1
20 TABLET, FILM COATED ORAL 2 TIMES DAILY
Qty: 28 TAB | Refills: 0 | Status: SHIPPED | OUTPATIENT
Start: 2019-08-22 | End: 2020-01-27

## 2019-08-22 RX ORDER — OLANZAPINE 2.5 MG/1
2.5 TABLET ORAL EVERY EVENING
Qty: 14 TAB | Refills: 0 | Status: SHIPPED | OUTPATIENT
Start: 2019-08-22 | End: 2019-10-10

## 2019-08-22 RX ORDER — CLONIDINE HYDROCHLORIDE 0.1 MG/1
0.1 TABLET ORAL 2 TIMES DAILY
Qty: 28 TAB | Refills: 0 | Status: SHIPPED | OUTPATIENT
Start: 2019-08-22 | End: 2019-10-10

## 2019-08-22 RX ORDER — DIVALPROEX SODIUM 500 MG/1
500 TABLET, DELAYED RELEASE ORAL 2 TIMES DAILY
Qty: 28 TAB | Refills: 0 | Status: ON HOLD | OUTPATIENT
Start: 2019-08-22 | End: 2019-10-10 | Stop reason: SDUPTHER

## 2019-08-22 RX ADMIN — FAMOTIDINE 20 MG: 20 TABLET ORAL at 08:22

## 2019-08-22 RX ADMIN — CLONIDINE HYDROCHLORIDE 0.1 MG: 0.1 TABLET ORAL at 08:22

## 2019-08-22 RX ADMIN — DIVALPROEX SODIUM 500 MG: 250 TABLET, DELAYED RELEASE ORAL at 08:22

## 2019-08-22 NOTE — DISCHARGE INSTRUCTIONS
BEHAVIORAL HEALTH NURSING DISCHARGE NOTE      The following personal items collected during your admission are returned to you:   Dental Appliance: Dental Appliances: None  Vision: Visual Aid: None  Hearing Aid:    Jewelry: Jewelry: Watch, With patient  Clothing: Clothing: Footwear, Shirt  Other Valuables: Other Valuables: (Preston Fine & Other belongings in locker)  Valuables sent to safe: Personal Items Sent to Safe: none      PATIENT INSTRUCTIONS:    Your health and safety is very important to us; we remind you to commit to safety and recovery. Should you have any thoughts of harming yourself or others please dial 911, utilize your support systems, the coping strategies you have learned as well as the [address] [place] [address] at [phone] or visit their website at https://suicidepreventionlifeline.org/    The following are some additional coping strategies that you can utilize if you start to feel anxious, angry, stressed or depressed    1. Exercise (running, walking, etc.). 2. Write (poetry, stories, journal). 3. Be with other people. 4. Watch a favorite TV show. 5. Practice Mindfulness  6. Watch a funny/favorite movie  7. Do some deep breathing  8. Take a hot shower or relaxing bath. 9. Play with a pet. 10. Help others  11. Read a good book. 12. Listen to music. 13. Try some aromatherapy (candle, lotion, room spray). 14. Meditate. 15. Paint or draw. 16. Rip paper into itty-bitty pieces. 17. Shoot hoops, kick a ball. 18. Hug a pillow or stuffed animal.  19. Dance. 20. Take up a new hobby. 21. Warren. 25. Make a list of blessings in your life. 23. Contact a hotline/ your therapist.  24. Talk to someone close to you. 25. Yoga. 32. Dalphine Spillers a friend or family member. 32. Make a list of goals for the week/month/year/5 years. The discharge information has been reviewed with the patient. The patient verbalized understanding.       Patient armband removed and shredded      ***IMPORTANT NUMBERS***        [address]        [phone]    [address] [facility]       [phone]    Suicide Prevention     [phone]

## 2019-08-23 NOTE — DISCHARGE SUMMARY
[address]  DISCHARGE    Name:  Sierra Fallon  MR#:   [id number]  :  1980  ACCOUNT #:  [de-identified]  ADMIT DATE:  2019  DISCHARGE DATE:  2019    SIGNIFICANT FINDINGS:  History and physical exam was performed shortly after the patient was admitted to the facility, attention being invited to this document, a place where the patient's reasons for admission, the findings of the examination performed in the emergency department, the same as his prior psychiatric history are very clearly stated. For the purpose of this discharge summary, the reader is advised that the patient was admitted [facility], a place where he consulted with a history of increased difficulties handling the egodystonic auditory hallucinations that even though were not commanding, had been there \"always and at any time. \"  The patient described fear of losing control with concerns about his being potentially self-harmful or harmful to others, the case was presented to the physician on-call who kindly admitted the patient to my service and so the basis for this admission. The patient has a prior history of one admission here under the care of one of my associates, at which time he was diagnosed with suffering with a bipolar II disorder and provided treatment with a combination of medications which included prescriptions for Depakote  mg twice a day and mirtazapine 15 mg every night at bedtime. He was then referred to outpatient care to be provided by Salvatore Ravi LCSW, also with our practice and outpatient appointments with Dr. Saritha Javier; however, the patient never made his appointment with us. As he stopped taking his medication shortly after discharge, he became psychotic again and depressed.   He described in addition a history of cocaine use, which obviously did not help with his depression nor with his relation with his wife of one year with whom he has also a daughter who is 25years of age. Concern about losing his family is one of the reason for which he also consulted for inpatient care. COURSE DURING HOSPITALIZATION AND TREATMENT:  Admitted on a voluntary basis to the adult program, the patient has been seen daily and has been referred to the groups within context of the program.  Rather despondent, I met upon admission and when the undersigned and the patient met, we discussed a specific treatment plan that we considered his being required to be able to improve enough to be able to be discharged. This included prescriptions for a combination of medications including Depakote  mg twice a day from which he derived positive treatment response before, in addition olanzapine 2.5 mg every night since we were concerned about the auditory hallucinatory process that he described. After couple of days here, the patient became much more open with the undersigned and indicated that he has actually never heard voices, that he felt the need to say this at the emergency room since he was concerned about not being able to be admitted; however, he did describe improvement on his mood with a prescription for olanzapine, the same as the prescription for Depakote which he had taken before. For that reason, olanzapine was not discontinued with the patient being suggested to continue treatment with this combination of medications as indicated. No evidence of medications-related side effects with the patient indicating upon our meeting today that he is much more stable and he is ready to be discharged to outpatient treatment. Multiple labs were performed during the patient's hospitalization, attention being invited to the history and physical exam, which described the initial test performed in the emergency department.   Since admission, since his being prescribed with olanzapine, we also added a lipid panel which showed triglycerides of 43, cholesterol 148, HDL of 60, cholesterol-HDL ratio of 2.5 and LDL of 79.4. A1c normal at 5.3%. TSH is slightly elevated to 394; however, the patient shows no evidence of thyroid disease (hypothyroidism). CONDITION UPON DISCHARGE:  Upon examination today, the patient is not suicidal or homicidal.  There is no evidence of psychosis and he is neurocognitively intact. The patient is considered to be at a baseline and is psychiatrically competent upon discharge. FINAL DIAGNOSES:  AXIS I:  Bipolar I disorder, most recent episode depressed, without psychotic symptoms. Cocaine use disorder, severe. AXIS II:  Deferred. AXIS III:  Hypertension, under treatment; asthma by history; left inguinal pain, musculoskeletal related, much improved. DISPOSITION:  The patient is being discharged today. He has been suggested to seek outpatient treatment with [facility], a place where he has been seen before in addition to his seeking care with a PCP of his choice as needed. PRESCRIPTIONS UPON DISCHARGE:  The patient will be provided with two weeks' supplies of the following medications through the Indigent Program here at [facility], including prescriptions for clonidine 0.1 mg twice a day, Depakote  mg twice a day, Pepcid 20 mg twice a day for \"heartburn,\" the same as olanzapine 2.5 mg tablets one every evening. PROGNOSIS:  It will depend upon the patient's treatment compliance and treatment response. He has been strongly advised to maintain absolute drug-related abstinence with participation in an intensive outpatient treatment program with 90 meetings in 90 days.         Julia Desai MD      [id number]  D:  08/22/2019 10:50  T:  08/22/2019 10:59  JOB #:  [id number]

## 2019-09-17 ENCOUNTER — HOSPITAL ENCOUNTER (EMERGENCY)
Age: 39
Discharge: HOME | End: 2019-09-18
Attending: EMERGENCY MEDICINE
Payer: MEDICAID

## 2019-09-17 VITALS
OXYGEN SATURATION: 100 % | HEART RATE: 58 BPM | DIASTOLIC BLOOD PRESSURE: 97 MMHG | BODY MASS INDEX: 25.54 KG/M2 | TEMPERATURE: 98.5 F | HEIGHT: 74 IN | RESPIRATION RATE: 22 BRPM | SYSTOLIC BLOOD PRESSURE: 142 MMHG | WEIGHT: 199 LBS

## 2019-09-17 DIAGNOSIS — R10.12 ABDOMINAL PAIN, LUQ (LEFT UPPER QUADRANT): Primary | ICD-10-CM

## 2019-09-17 LAB
ALBUMIN SERPL-MCNC: 3.9 G/DL (ref 3.4–5)
ALBUMIN/GLOB SERPL: 1.1 {RATIO} (ref 0.8–1.7)
ALP SERPL-CCNC: 75 U/L (ref 45–117)
ALT SERPL-CCNC: 16 U/L (ref 16–61)
AMPHET UR QL SCN: NEGATIVE
ANION GAP SERPL CALC-SCNC: 7 MMOL/L (ref 3–18)
AST SERPL-CCNC: 14 U/L (ref 10–38)
BARBITURATES UR QL SCN: NEGATIVE
BASOPHILS # BLD: 0 K/UL (ref 0–0.1)
BASOPHILS NFR BLD: 0 % (ref 0–2)
BENZODIAZ UR QL: NEGATIVE
BILIRUB SERPL-MCNC: 0.9 MG/DL (ref 0.2–1)
BUN SERPL-MCNC: 13 MG/DL (ref 7–18)
BUN/CREAT SERPL: 10 (ref 12–20)
CALCIUM SERPL-MCNC: 9.5 MG/DL (ref 8.5–10.1)
CANNABINOIDS UR QL SCN: NEGATIVE
CHLORIDE SERPL-SCNC: 100 MMOL/L (ref 100–111)
CO2 SERPL-SCNC: 33 MMOL/L (ref 21–32)
COCAINE UR QL SCN: POSITIVE
CREAT SERPL-MCNC: 1.32 MG/DL (ref 0.6–1.3)
DIFFERENTIAL METHOD BLD: ABNORMAL
EOSINOPHIL # BLD: 0.1 K/UL (ref 0–0.4)
EOSINOPHIL NFR BLD: 2 % (ref 0–5)
ERYTHROCYTE [DISTWIDTH] IN BLOOD BY AUTOMATED COUNT: 13.1 % (ref 11.6–14.5)
ETHANOL SERPL-MCNC: <3 MG/DL (ref 0–3)
GLOBULIN SER CALC-MCNC: 3.4 G/DL (ref 2–4)
GLUCOSE SERPL-MCNC: 96 MG/DL (ref 74–99)
HCT VFR BLD AUTO: 48.9 % (ref 36–48)
HDSCOM,HDSCOM: ABNORMAL
HGB BLD-MCNC: 16.8 G/DL (ref 13–16)
LIPASE SERPL-CCNC: 85 U/L (ref 73–393)
LYMPHOCYTES # BLD: 1.9 K/UL (ref 0.9–3.6)
LYMPHOCYTES NFR BLD: 30 % (ref 21–52)
MAGNESIUM SERPL-MCNC: 2 MG/DL (ref 1.6–2.6)
MCH RBC QN AUTO: 29.9 PG (ref 24–34)
MCHC RBC AUTO-ENTMCNC: 34.4 G/DL (ref 31–37)
MCV RBC AUTO: 87.2 FL (ref 74–97)
METHADONE UR QL: NEGATIVE
MONOCYTES # BLD: 0.4 K/UL (ref 0.05–1.2)
MONOCYTES NFR BLD: 7 % (ref 3–10)
NEUTS SEG # BLD: 3.8 K/UL (ref 1.8–8)
NEUTS SEG NFR BLD: 61 % (ref 40–73)
OPIATES UR QL: NEGATIVE
PCP UR QL: NEGATIVE
PLATELET # BLD AUTO: 257 K/UL (ref 135–420)
PMV BLD AUTO: 9.7 FL (ref 9.2–11.8)
POTASSIUM SERPL-SCNC: 3.7 MMOL/L (ref 3.5–5.5)
PROT SERPL-MCNC: 7.3 G/DL (ref 6.4–8.2)
RBC # BLD AUTO: 5.61 M/UL (ref 4.7–5.5)
SODIUM SERPL-SCNC: 140 MMOL/L (ref 136–145)
WBC # BLD AUTO: 6.2 K/UL (ref 4.6–13.2)

## 2019-09-17 PROCEDURE — 83690 ASSAY OF LIPASE: CPT

## 2019-09-17 PROCEDURE — 96375 TX/PRO/DX INJ NEW DRUG ADDON: CPT

## 2019-09-17 PROCEDURE — 80307 DRUG TEST PRSMV CHEM ANLYZR: CPT

## 2019-09-17 PROCEDURE — 85025 COMPLETE CBC W/AUTO DIFF WBC: CPT

## 2019-09-17 PROCEDURE — 74011250637 HC RX REV CODE- 250/637: Performed by: EMERGENCY MEDICINE

## 2019-09-17 PROCEDURE — 74011250636 HC RX REV CODE- 250/636: Performed by: EMERGENCY MEDICINE

## 2019-09-17 PROCEDURE — 74011000250 HC RX REV CODE- 250: Performed by: EMERGENCY MEDICINE

## 2019-09-17 PROCEDURE — 99283 EMERGENCY DEPT VISIT LOW MDM: CPT

## 2019-09-17 PROCEDURE — 80053 COMPREHEN METABOLIC PANEL: CPT

## 2019-09-17 PROCEDURE — 83735 ASSAY OF MAGNESIUM: CPT

## 2019-09-17 PROCEDURE — 96374 THER/PROPH/DIAG INJ IV PUSH: CPT

## 2019-09-17 RX ORDER — FAMOTIDINE 10 MG/ML
20 INJECTION INTRAVENOUS
Status: COMPLETED | OUTPATIENT
Start: 2019-09-17 | End: 2019-09-17

## 2019-09-17 RX ORDER — LIDOCAINE HYDROCHLORIDE 20 MG/ML
15 SOLUTION OROPHARYNGEAL AS NEEDED
Status: DISCONTINUED | OUTPATIENT
Start: 2019-09-17 | End: 2019-09-18 | Stop reason: HOSPADM

## 2019-09-17 RX ORDER — ONDANSETRON 2 MG/ML
4 INJECTION INTRAMUSCULAR; INTRAVENOUS
Status: COMPLETED | OUTPATIENT
Start: 2019-09-17 | End: 2019-09-17

## 2019-09-17 RX ADMIN — ALUMINUM HYDROXIDE AND MAGNESIUM HYDROXIDE 15 ML: 200; 200 SUSPENSION ORAL at 22:59

## 2019-09-17 RX ADMIN — LIDOCAINE HYDROCHLORIDE 15 ML: 20 SOLUTION ORAL; TOPICAL at 22:59

## 2019-09-17 RX ADMIN — FAMOTIDINE 20 MG: 10 INJECTION, SOLUTION INTRAVENOUS at 23:00

## 2019-09-17 RX ADMIN — ONDANSETRON 4 MG: 2 INJECTION INTRAMUSCULAR; INTRAVENOUS at 23:01

## 2019-09-18 RX ORDER — HYDROGEN PEROXIDE 3 %
20 SOLUTION, NON-ORAL MISCELLANEOUS DAILY
Qty: 20 CAP | Refills: 0 | Status: SHIPPED | OUTPATIENT
Start: 2019-09-18 | End: 2019-10-10

## 2019-09-18 RX ORDER — SUCRALFATE 1 G/1
1 TABLET ORAL 4 TIMES DAILY
Qty: 80 TAB | Refills: 0 | Status: SHIPPED | OUTPATIENT
Start: 2019-09-18 | End: 2019-10-08

## 2019-09-18 RX ORDER — ONDANSETRON 4 MG/1
TABLET, ORALLY DISINTEGRATING ORAL
Qty: 10 TAB | Refills: 0 | Status: SHIPPED | OUTPATIENT
Start: 2019-09-18 | End: 2019-10-10

## 2019-09-18 NOTE — ED TRIAGE NOTES
Patient c/o epigastric pain and left upper quadrant pain x 2 months. C/o vomiting x 2 today. Advises that he had normal bowel movement today. Patient states that he is trying to get off crack cocaine. States last using crack cocaine two days ago. Voices concerns for possible ulcer.

## 2019-09-18 NOTE — ED PROVIDER NOTES
Zach Chopra is a 37 y.o. Male with a past medical history of asthma, bipolar, and crack cocaine abuse coming in with abdominal pain. Patient reports intermittent left upper abdominal pain for months. He states the pain is burning in nature and nonradiating. He states pain is much worse with food and associated with nausea and vomiting. He states he has had very little appetite over the last week due to the pain. He states that the pain is always worse when he is using crack cocaine. He states his last use was 2 days ago. He denies any alcohol use at all. Patient states that he is concerned he has an ulcer. He states he is unsure if he had any blood in his vomit because he has been eating tomato-based soup. Denies any bloody stool, but does report some constipation. Denies any melena. Denies any fevers or chills. Denies any chest pain or shortness of breath. Denies any urinary symptoms or dysuria. No other complaints at this time. Past Medical History:   Diagnosis Date    Acid reflux     Asthma     [facility] 3[facility]1/2019    Cocaine use disorder, severe, dependence ([facility]        History reviewed. No pertinent surgical history. History reviewed. No pertinent family history.     Social History     Socioeconomic History    Marital status: SINGLE     Spouse name: Not on file    Number of children: Not on file    Years of education: Not on file    Highest education level: Not on file   Occupational History    Not on file   Social Needs    Financial resource strain: Not on file    Food insecurity:     Worry: Not on file     Inability: Not on file    Transportation needs:     Medical: Not on file     Non-medical: Not on file   Tobacco Use    Smoking status: Current Every Day Smoker     Packs/day: 1.00    Smokeless tobacco: Never Used   Substance and Sexual Activity    Alcohol use: Not Currently    Drug use: Yes     Types: Cocaine    Sexual activity: Not Currently   Lifestyle    Physical activity:     Days per week: Not on file     Minutes per session: Not on file    Stress: Not on file   Relationships    Social connections:     Talks on phone: Not on file     Gets together: Not on file     Attends Yazidism service: Not on file     Active member of club or organization: Not on file     Attends meetings of clubs or organizations: Not on file     Relationship status: Not on file    Intimate partner violence:     Fear of current or ex partner: Not on file     Emotionally abused: Not on file     Physically abused: Not on file     Forced sexual activity: Not on file   Other Topics Concern    Not on file   Social History Narrative    Not on file         ALLERGIES: Patient has no known allergies. Review of Systems   Constitutional: Negative. Negative for chills and fever. HENT: Negative. Eyes: Negative. Respiratory: Negative. Negative for shortness of breath. Cardiovascular: Negative. Negative for chest pain and leg swelling. Gastrointestinal: Positive for abdominal pain, nausea and vomiting. Negative for blood in stool and diarrhea. Genitourinary: Negative. Negative for dysuria. Musculoskeletal: Negative. Negative for back pain and myalgias. Skin: Negative. Negative for rash. Neurological: Negative. Negative for dizziness, weakness and light-headedness. Psychiatric/Behavioral: Negative. Negative for self-injury. All other systems reviewed and are negative. Vitals:    09/17/19 2210   BP: (!) 142/97   Pulse: (!) 58   Resp: 22   Temp: 98.5 °F (36.9 °C)   SpO2: 100%   Weight: 90.3 kg (199 lb)   Height: 6' 2\" (1.88 m)            Physical Exam   Constitutional: He is oriented to person, place, and time. He appears well-developed and well-nourished. No distress. HENT:   Head: Normocephalic and atraumatic. Eyes: Pupils are equal, round, and reactive to light. EOM are normal. No scleral icterus. Neck: Normal range of motion. Neck supple. No JVD present. Cardiovascular: Normal rate, regular rhythm, S1 normal and S2 normal. Exam reveals no gallop and no friction rub. No murmur heard. Pulmonary/Chest: Effort normal and breath sounds normal. No accessory muscle usage. No respiratory distress. Abdominal: Soft. Normal appearance. He exhibits no distension. There is tenderness. There is no rigidity, no rebound and no guarding. Mild to moderate tenderness in the epigastrium and left upper quadrant. No rigidity or peritoneal signs. No rebound. Musculoskeletal: Normal range of motion. He exhibits no edema or tenderness. Neurological: He is alert and oriented to person, place, and time. He has normal strength. Coordination normal.   Skin: Skin is warm and intact. No rash noted. Psychiatric: He has a normal mood and affect. His speech is normal and behavior is normal.   Vitals reviewed. MDM  Number of Diagnoses or Management Options  Abdominal pain, LUQ (left upper quadrant):   Diagnosis management comments: Deidra Garza is a 37 y.o. Male coming in with left upper quadrant epigastric pain and vomiting. Differential diagnosis includes acute pancreatitis, cholecystitis, gastritis, peptic ulcer disease, or atypical ACS. Will treat patient symptomatically and get work-up to rule out acute surgical emergency. Labs reassuring. Patient feeling much better after antiemetics, PPI, GI cocktail. Referred to GI for outpatient follow-up. Given return precautions for worsening symptoms. Patient verbalizes understanding all his discharge and follow-up instructions and agrees with this plan.          Procedures    Vitals:  Patient Vitals for the past 12 hrs:   Temp Pulse Resp BP SpO2   09/17/19 2210 98.5 °F (36.9 °C) (!) 58 22 (!) 142/97 100 %       Medications ordered:   Medications   lidocaine (XYLOCAINE) 2 % viscous solution 15 mL (15 mL Mouth/Throat Given 9/17/19 6503)   famotidine (PF) (PEPCID) injection 20 mg (20 mg IntraVENous Given 9/17/19 2300)   aluminum-magnesium hydroxide (MAALOX) oral suspension 15 mL (15 mL Oral Given 9/17/19 2259)   ondansetron (ZOFRAN) injection 4 mg (4 mg IntraVENous Given 9/17/19 2301)         Lab findings:  Recent Results (from the past 12 hour(s))   CBC WITH AUTOMATED DIFF    Collection Time: 09/17/19 10:55 PM   Result Value Ref Range    WBC 6.2 4.6 - 13.2 K/uL    RBC 5.61 (H) 4.70 - 5.50 M/uL    HGB 16.8 (H) 13.0 - 16.0 g/dL    HCT 48.9 (H) 36.0 - 48.0 %    MCV 87.2 74.0 - 97.0 FL    MCH 29.9 24.0 - 34.0 PG    MCHC 34.4 31.0 - 37.0 g/dL    RDW 13.1 11.6 - 14.5 %    PLATELET 671 896 - 219 K/uL    MPV 9.7 9.2 - 11.8 FL    NEUTROPHILS 61 40 - 73 %    LYMPHOCYTES 30 21 - 52 %    MONOCYTES 7 3 - 10 %    EOSINOPHILS 2 0 - 5 %    BASOPHILS 0 0 - 2 %    ABS. NEUTROPHILS 3.8 1.8 - 8.0 K/UL    ABS. LYMPHOCYTES 1.9 0.9 - 3.6 K/UL    ABS. MONOCYTES 0.4 0.05 - 1.2 K/UL    ABS. EOSINOPHILS 0.1 0.0 - 0.4 K/UL    ABS. BASOPHILS 0.0 0.0 - 0.1 K/UL    DF AUTOMATED     METABOLIC PANEL, COMPREHENSIVE    Collection Time: 09/17/19 10:55 PM   Result Value Ref Range    Sodium 140 136 - 145 mmol/L    Potassium 3.7 3.5 - 5.5 mmol/L    Chloride 100 100 - 111 mmol/L    CO2 33 (H) 21 - 32 mmol/L    Anion gap 7 3.0 - 18 mmol/L    Glucose 96 74 - 99 mg/dL    BUN 13 7.0 - 18 MG/DL    Creatinine 1.32 (H) 0.6 - 1.3 MG/DL    BUN/Creatinine ratio 10 (L) 12 - 20      GFR est AA >60 >60 ml/min/1.73m2    GFR est non-AA 60 (L) >60 ml/min/1.73m2    Calcium 9.5 8.5 - 10.1 MG/DL    Bilirubin, total 0.9 0.2 - 1.0 MG/DL    ALT (SGPT) 16 16 - 61 U/L    AST (SGOT) 14 10 - 38 U/L    Alk.  phosphatase 75 45 - 117 U/L    Protein, total 7.3 6.4 - 8.2 g/dL    Albumin 3.9 3.4 - 5.0 g/dL    Globulin 3.4 2.0 - 4.0 g/dL    A-G Ratio 1.1 0.8 - 1.7     LIPASE    Collection Time: 09/17/19 10:55 PM   Result Value Ref Range    Lipase 85 73 - 393 U/L   MAGNESIUM    Collection Time: 09/17/19 10:55 PM   Result Value Ref Range    Magnesium 2.0 1.6 - 2.6 mg/dL ETHYL ALCOHOL    Collection Time: 09/17/19 10:55 PM   Result Value Ref Range    ALCOHOL(ETHYL),SERUM <3 0 - 3 MG/DL   DRUG SCREEN, URINE    Collection Time: 09/17/19 11:30 PM   Result Value Ref Range    BENZODIAZEPINES NEGATIVE  NEG      BARBITURATES NEGATIVE  NEG      THC (TH-CANNABINOL) NEGATIVE  NEG      OPIATES NEGATIVE  NEG      PCP(PHENCYCLIDINE) NEGATIVE  NEG      COCAINE POSITIVE (A) NEG      AMPHETAMINES NEGATIVE  NEG      METHADONE NEGATIVE  NEG      HDSCOM (NOTE)        Disposition:  Diagnosis:   1. Abdominal pain, LUQ (left upper quadrant)        Disposition: Discharge    Follow-up Information     Follow up With Specialties Details Why Contact Info    Lito Daugherty MD Gastroenterology, Internal Medicine Schedule an appointment as soon as possible for a visit for office follow up [address] [address]  [address] [address] DEPT Emergency Medicine  As needed, If symptoms worsen [facility]  [phone]           Patient's Medications   Start Taking    ESOMEPRAZOLE (NEXIUM) 20 MG CAPSULE    Take 1 Cap by mouth daily for 20 days. SUCRALFATE (CARAFATE) 1 GRAM TABLET    Take 1 Tab by mouth four (4) times daily for 20 days. Continue Taking    CLONIDINE HCL (CATAPRES) 0.1 MG TABLET    Take 1 Tab by mouth two (2) times a day. Indications: high blood pressure    DIVALPROEX DR (DEPAKOTE) 500 MG TABLET    Take 1 Tab by mouth two (2) times a day. Indications: Bipolar disorder. FAMOTIDINE (PEPCID) 20 MG TABLET    Take 1 Tab by mouth two (2) times a day. Indications: heartburn    OLANZAPINE (ZYPREXA) 2.5 MG TABLET    Take 1 Tab by mouth every evening. Indications: mood disorder.    These Medications have changed    No medications on file   Stop Taking    No medications on file

## 2019-09-18 NOTE — DISCHARGE INSTRUCTIONS

## 2019-10-08 ENCOUNTER — HOSPITAL ENCOUNTER (INPATIENT)
Age: 39
LOS: 2 days | Discharge: HOME | End: 2019-10-10
Attending: EMERGENCY MEDICINE | Admitting: PSYCHIATRY & NEUROLOGY
Payer: COMMERCIAL

## 2019-10-08 DIAGNOSIS — R45.851 SUICIDAL IDEATIONS: Primary | ICD-10-CM

## 2019-10-08 PROBLEM — F31.9 BIPOLAR DISORDER (HCC): Status: ACTIVE | Noted: 2019-10-08

## 2019-10-08 PROBLEM — F31.81 SEVERE DEPRESSED BIPOLAR II DISORDER WITHOUT PSYCHOTIC FEATURES (HCC): Status: RESOLVED | Noted: 2019-03-01 | Resolved: 2019-10-08

## 2019-10-08 PROBLEM — F31.9 BIPOLAR DISORDER (HCC): Status: RESOLVED | Noted: 2019-10-08 | Resolved: 2019-10-08

## 2019-10-08 PROBLEM — F31.81 BIPOLAR 2 DISORDER (HCC): Status: RESOLVED | Noted: 2019-08-19 | Resolved: 2019-10-08

## 2019-10-08 PROBLEM — F14.20 COCAINE USE DISORDER, SEVERE, DEPENDENCE (HCC): Status: ACTIVE | Noted: 2019-10-08

## 2019-10-08 LAB
ALBUMIN SERPL-MCNC: 3.7 G/DL (ref 3.4–5)
ALBUMIN/GLOB SERPL: 1.2 {RATIO} (ref 0.8–1.7)
ALP SERPL-CCNC: 70 U/L (ref 45–117)
ALT SERPL-CCNC: 21 U/L (ref 16–61)
AMPHET UR QL SCN: NEGATIVE
ANION GAP SERPL CALC-SCNC: 5 MMOL/L (ref 3–18)
APPEARANCE UR: CLEAR
AST SERPL-CCNC: 24 U/L (ref 10–38)
BARBITURATES UR QL SCN: NEGATIVE
BASOPHILS # BLD: 0 K/UL (ref 0–0.1)
BASOPHILS NFR BLD: 0 % (ref 0–2)
BENZODIAZ UR QL: NEGATIVE
BILIRUB SERPL-MCNC: 0.5 MG/DL (ref 0.2–1)
BILIRUB UR QL: NEGATIVE
BUN SERPL-MCNC: 8 MG/DL (ref 7–18)
BUN/CREAT SERPL: 7 (ref 12–20)
CALCIUM SERPL-MCNC: 8.3 MG/DL (ref 8.5–10.1)
CANNABINOIDS UR QL SCN: NEGATIVE
CHLORIDE SERPL-SCNC: 103 MMOL/L (ref 100–111)
CO2 SERPL-SCNC: 31 MMOL/L (ref 21–32)
COCAINE UR QL SCN: POSITIVE
COLOR UR: YELLOW
CREAT SERPL-MCNC: 1.2 MG/DL (ref 0.6–1.3)
DIFFERENTIAL METHOD BLD: NORMAL
EOSINOPHIL # BLD: 0.3 K/UL (ref 0–0.4)
EOSINOPHIL NFR BLD: 5 % (ref 0–5)
ERYTHROCYTE [DISTWIDTH] IN BLOOD BY AUTOMATED COUNT: 13.3 % (ref 11.6–14.5)
ETHANOL SERPL-MCNC: <3 MG/DL (ref 0–3)
GLOBULIN SER CALC-MCNC: 3.2 G/DL (ref 2–4)
GLUCOSE SERPL-MCNC: 92 MG/DL (ref 74–99)
GLUCOSE UR STRIP.AUTO-MCNC: NEGATIVE MG/DL
HCT VFR BLD AUTO: 43.9 % (ref 36–48)
HDSCOM,HDSCOM: ABNORMAL
HGB BLD-MCNC: 15.4 G/DL (ref 13–16)
HGB UR QL STRIP: NEGATIVE
KETONES UR QL STRIP.AUTO: NEGATIVE MG/DL
LEUKOCYTE ESTERASE UR QL STRIP.AUTO: NEGATIVE
LYMPHOCYTES # BLD: 2.2 K/UL (ref 0.9–3.6)
LYMPHOCYTES NFR BLD: 39 % (ref 21–52)
MCH RBC QN AUTO: 30.6 PG (ref 24–34)
MCHC RBC AUTO-ENTMCNC: 35.1 G/DL (ref 31–37)
MCV RBC AUTO: 87.1 FL (ref 74–97)
METHADONE UR QL: NEGATIVE
MONOCYTES # BLD: 0.4 K/UL (ref 0.05–1.2)
MONOCYTES NFR BLD: 6 % (ref 3–10)
NEUTS SEG # BLD: 2.9 K/UL (ref 1.8–8)
NEUTS SEG NFR BLD: 50 % (ref 40–73)
NITRITE UR QL STRIP.AUTO: NEGATIVE
OPIATES UR QL: NEGATIVE
PCP UR QL: NEGATIVE
PH UR STRIP: 7 [PH] (ref 5–8)
PLATELET # BLD AUTO: 231 K/UL (ref 135–420)
PMV BLD AUTO: 9.3 FL (ref 9.2–11.8)
POTASSIUM SERPL-SCNC: 3.9 MMOL/L (ref 3.5–5.5)
PROT SERPL-MCNC: 6.9 G/DL (ref 6.4–8.2)
PROT UR STRIP-MCNC: NEGATIVE MG/DL
RBC # BLD AUTO: 5.04 M/UL (ref 4.7–5.5)
SODIUM SERPL-SCNC: 139 MMOL/L (ref 136–145)
SP GR UR REFRACTOMETRY: <1.005 (ref 1–1.03)
UROBILINOGEN UR QL STRIP.AUTO: 0.2 EU/DL (ref 0.2–1)
WBC # BLD AUTO: 5.8 K/UL (ref 4.6–13.2)

## 2019-10-08 PROCEDURE — 99284 EMERGENCY DEPT VISIT MOD MDM: CPT

## 2019-10-08 PROCEDURE — 80307 DRUG TEST PRSMV CHEM ANLYZR: CPT

## 2019-10-08 PROCEDURE — 80053 COMPREHEN METABOLIC PANEL: CPT

## 2019-10-08 PROCEDURE — 74011250637 HC RX REV CODE- 250/637: Performed by: PSYCHIATRY & NEUROLOGY

## 2019-10-08 PROCEDURE — 85025 COMPLETE CBC W/AUTO DIFF WBC: CPT

## 2019-10-08 PROCEDURE — 65220000003 HC RM SEMIPRIVATE PSYCH

## 2019-10-08 PROCEDURE — 81003 URINALYSIS AUTO W/O SCOPE: CPT

## 2019-10-08 RX ORDER — HYDROXYZINE PAMOATE 50 MG/1
50 CAPSULE ORAL
Status: DISCONTINUED | OUTPATIENT
Start: 2019-10-08 | End: 2019-10-10 | Stop reason: HOSPADM

## 2019-10-08 RX ORDER — HALOPERIDOL 5 MG/1
5 TABLET ORAL
Status: DISCONTINUED | OUTPATIENT
Start: 2019-10-08 | End: 2019-10-10 | Stop reason: HOSPADM

## 2019-10-08 RX ORDER — DIVALPROEX SODIUM 500 MG/1
500 TABLET, EXTENDED RELEASE ORAL
Status: DISCONTINUED | OUTPATIENT
Start: 2019-10-08 | End: 2019-10-10 | Stop reason: HOSPADM

## 2019-10-08 RX ORDER — HALOPERIDOL 5 MG/ML
5 INJECTION INTRAMUSCULAR
Status: DISCONTINUED | OUTPATIENT
Start: 2019-10-08 | End: 2019-10-10 | Stop reason: HOSPADM

## 2019-10-08 RX ORDER — FAMOTIDINE 20 MG/1
20 TABLET, FILM COATED ORAL 2 TIMES DAILY
Status: DISCONTINUED | OUTPATIENT
Start: 2019-10-08 | End: 2019-10-10 | Stop reason: HOSPADM

## 2019-10-08 RX ORDER — IBUPROFEN 200 MG
1 TABLET ORAL DAILY
Status: DISCONTINUED | OUTPATIENT
Start: 2019-10-08 | End: 2019-10-10 | Stop reason: HOSPADM

## 2019-10-08 RX ORDER — PANTOPRAZOLE SODIUM 40 MG/1
40 TABLET, DELAYED RELEASE ORAL DAILY
Status: DISCONTINUED | OUTPATIENT
Start: 2019-10-08 | End: 2019-10-10 | Stop reason: HOSPADM

## 2019-10-08 RX ORDER — ADHESIVE BANDAGE
5 BANDAGE TOPICAL DAILY PRN
Status: DISCONTINUED | OUTPATIENT
Start: 2019-10-08 | End: 2019-10-10 | Stop reason: HOSPADM

## 2019-10-08 RX ORDER — TRAZODONE HYDROCHLORIDE 50 MG/1
50 TABLET ORAL
Status: DISCONTINUED | OUTPATIENT
Start: 2019-10-08 | End: 2019-10-10 | Stop reason: HOSPADM

## 2019-10-08 RX ORDER — SUCRALFATE 1 G/1
1 TABLET ORAL
Status: DISCONTINUED | OUTPATIENT
Start: 2019-10-08 | End: 2019-10-10 | Stop reason: HOSPADM

## 2019-10-08 RX ORDER — IBUPROFEN 400 MG/1
400 TABLET ORAL
Status: DISCONTINUED | OUTPATIENT
Start: 2019-10-08 | End: 2019-10-10 | Stop reason: HOSPADM

## 2019-10-08 RX ADMIN — MAGNESIUM HYDROXIDE 5 ML: 400 SUSPENSION ORAL at 16:56

## 2019-10-08 RX ADMIN — SUCRALFATE 1 G: 1 TABLET ORAL at 16:57

## 2019-10-08 RX ADMIN — IBUPROFEN 400 MG: 400 TABLET ORAL at 20:35

## 2019-10-08 RX ADMIN — PANTOPRAZOLE SODIUM 40 MG: 40 TABLET, DELAYED RELEASE ORAL at 16:58

## 2019-10-08 RX ADMIN — DIVALPROEX SODIUM 500 MG: 500 TABLET, EXTENDED RELEASE ORAL at 20:33

## 2019-10-08 RX ADMIN — IBUPROFEN 400 MG: 400 TABLET ORAL at 10:42

## 2019-10-08 RX ADMIN — FAMOTIDINE 20 MG: 20 TABLET ORAL at 16:57

## 2019-10-08 RX ADMIN — TRAZODONE HYDROCHLORIDE 50 MG: 50 TABLET ORAL at 20:35

## 2019-10-08 RX ADMIN — SUCRALFATE 1 G: 1 TABLET ORAL at 22:00

## 2019-10-08 RX ADMIN — FAMOTIDINE 20 MG: 20 TABLET ORAL at 20:33

## 2019-10-08 NOTE — GROUP NOTE
IP  GROUP DOCUMENTATION INDIVIDUAL Group Therapy Note Date: October 8 Group Start Time: 46 Group End Time: 3219 Group Topic: Topic Group [facility] 1 ADULT CHEM Cari Mccall RN 
 
JUANIS  GROUP DOCUMENTATION GROUP Group Therapy Note Attendees: 4 Attendance: Did not attend Additional Notes:  Arrived on unit at 1015 ad getting acclimated to the unit Angel Leslie RN
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
## 2019-10-08 NOTE — ED NOTES
TRANSFER - OUT REPORT:    Verbal report given to NORAH Alcala(name) on Inna Watkins  being transferred to Adult behavioral (unit) for routine progression of care       Report consisted of patients Situation, Background, Assessment and   Recommendations(SBAR). Information from the following report(s) ED Summary was reviewed with the receiving nurse. Lines:       Opportunity for questions and clarification was provided.       Patient transported with:   [email or web address]

## 2019-10-08 NOTE — BSMART NOTE
Comprehensive Assessment Form Part 1 Section I - Disposition The Medical Doctor to Psychiatrist conference was completed. The Medical Doctor is in agreement with Psychiatrist disposition because of (reason) suicidal ideation. The plan is admit. The on-call Psychiatrist consulted was Dr. Harpreet Mckeon. The admitting Psychiatrist will be Dr. Camacho Hawley. The admitting Diagnosis is Bipolar Disorder, Cocaine Dependence. Admitted to room Unit ADCD Section II - Integrated Summary Summary:  37year old male who presented voluntarily to the ER seeking help in regards to his cocaine addiction and reporting suicidal ideations. Interviewed in room 21 @ the request of Fairy Frankel, [place]. Patient sleeping upon arrival to room. Did awaken after calling his name several times. Dress in his personal attire ( informed he needed to charge into paper scrubs that were sitting on chair in the room). Hygiene and grooming appropriate. Cooperative with interview. Alert and oriented. Reports feeling suicidal with no plan. Feels very depressed. Reports he is hearing voices at times, no command in nature. States voices at times say to him \" Puja Bring get through it. \" and at times more negative in nature. States he is unable to stop using crack cocaine. Reports he smokes a lot everyday. Reports he has lost around 55 pounds in the last few months. No appetite when doing drugs. Became tearful when discussing how his drug use is effecting his family. Denied any homicidal ideations. Seeking a voluntary inpatient admission as he is unable to contract for safety outside of the hospital. 
 
Inpatient: [facility]  8/18/19-8/22/19 Outpatient: none Legal: denied Medications: non compliant The patient is deemed competent to provide informed consent. The Chief Complaint is suicidal ideation and depression Osorio Erazo The Precipitant Factors are crack cocaine dependence. Non compliant with Psychiatric medications and outpatient treatment. Osorio Erazo Section V - Substance Abuse The patient is using substances. The patient is using tobacco by inhalation for greater than 10 years with last use on 10/8/19 and cocaine by inhalation for greater than 10 years with last use on 10/8/19, reports began to abuse crack cocaine in his early twenties. . The patient has experienced the following withdrawal symptoms,  cravings and sleep disturbance.  
 
 
Mesha Ochoa RN

## 2019-10-08 NOTE — BH NOTES
Patient arrived on the unit via wheel chair, vitals were taken, belongings secured, and acclimated to unit protocol. Patient presented with report of \"Im here because I used crack/cocaine\". Pt states he was here in late August, but relapsed afterwards and returned to using drugs. Denies alcohol abuse, but states he does smoke cigarettes. Pt states he has been depressed all his life, and became suicidal after this drug binge. Pt states \"everything\" is a stressor to him. He reports that he does have family and [employer] support, but is embarrassed to ask for help from them. He states he has two beautiful daughters, both 25years old, whom just graduated from high school. He currently lives with his sister in a private residence and plans to be discharged back there. His plan is to go to a rehabilitation clinic following discharge. Directed to discuss with . He has a cyst like lesion on his left arm and states there was pus in it before, but no seems to be healing. However, there is still a lump or elevation on his skin. He also complained of his stomach hurting and thinking he has an ulcer. He reported wanting medication for indigestion. Communicated to MD.  He denies having a primary care doctor out in the community. Patient denies of current thoughts of SI and able to contract for safety. Will continue to monitor for safety and support.     RN will initiate, develop, implement, review and/or revise treatment plan daily

## 2019-10-08 NOTE — BSMART NOTE
ART THERAPY GROUP PROGRESS NOTE Group time:[id number] The patient refused group. He was initially seen sitting in the day area with student nurses laughing and talking while finishing his lunch. He claimed his \"stomach didn't feel right\" when encouraged to participate and declined to join.

## 2019-10-08 NOTE — ED PROVIDER NOTES
EMERGENCY DEPARTMENT HISTORY AND PHYSICAL EXAM    Date: 10/8/2019  Patient Name: Roman Mak    History of Presenting Illness     No chief complaint on file. History Provided By: patient  Chief Complaint: wants help for his cocaine use disorder  Duration: weeks  Timing: constant  Modifying Factors: hx of severe cocaine use disorder  Associated Symptoms: none     Additional History (Context): Roman Mak is a 37 y.o. male with a history of severe cocaine use disorder who presents to the ED seeking help for his cocaine use. He states that his last cocaine use was 0230 this a.m. Patient denied any suicidal or homicidal ideations upon initial evaluation and states that he is only here seeking help for his disorder. Patient then changed his mind and said that he is suicidal without a plan and wants inpatient treatment for his cocaine use. Patient also feels as if he is going through withdrawals that are causing upper abdominal pain. Patient states that he has not been taking his Prilosec, Nexium, or Pepcid for his acid reflux. Patient denies visual and auditory hallucinations. He also denies fever/chills, chest pain, shortness of breath, vomiting, diarrhea, constipation. PCP: None    Current Outpatient Medications   Medication Sig Dispense Refill    esomeprazole (NEXIUM) 20 mg capsule Take 1 Cap by mouth daily for 20 days. 20 Cap 0    sucralfate (CARAFATE) 1 gram tablet Take 1 Tab by mouth four (4) times daily for 20 days. 80 Tab 0    ondansetron (ZOFRAN ODT) 4 mg disintegrating tablet Take 1-2 tablets every 6-8 hours as needed for nausea and vomiting. 10 Tab 0    cloNIDine HCl (CATAPRES) 0.1 mg tablet Take 1 Tab by mouth two (2) times a day. Indications: high blood pressure 28 Tab 0    divalproex DR (DEPAKOTE) 500 mg tablet Take 1 Tab by mouth two (2) times a day. Indications: Bipolar disorder. 28 Tab 0    famotidine (PEPCID) 20 mg tablet Take 1 Tab by mouth two (2) times a day.  Indications: heartburn 28 Tab 0    OLANZapine (ZYPREXA) 2.5 mg tablet Take 1 Tab by mouth every evening. Indications: mood disorder. 14 Tab 0       Past History     Past Medical History:  Past Medical History:   Diagnosis Date    Acid reflux     Asthma     Bipolar disorder, unspecified ([facility] [address] [facility] 3/1/2019    Cocaine use disorder, severe, dependence ([facility]        Past Surgical History:  History reviewed. No pertinent surgical history. Family History:  History reviewed. No pertinent family history. Social History:  Social History     Tobacco Use    Smoking status: Current Every Day Smoker     Packs/day: 1.00    Smokeless tobacco: Never Used   Substance Use Topics    Alcohol use: Not Currently    Drug use: Yes     Types: Cocaine       Allergies:  No Known Allergies      Review of Systems   Review of Systems   Constitutional: Negative for chills and fever. Respiratory: Negative for shortness of breath. Cardiovascular: Negative for chest pain. Gastrointestinal: Positive for abdominal pain. Negative for constipation, diarrhea, nausea and vomiting. Psychiatric/Behavioral: Positive for suicidal ideas. Negative for hallucinations and self-injury. The patient is not nervous/anxious. All other systems reviewed and are negative. All Other Systems Negative  Physical Exam     Vitals:    10/08/19 0544   BP: 120/77   Pulse: 74   Resp: 16   Temp: 98.2 °F (36.8 °C)   SpO2: 99%   Weight: 79.4 kg (175 lb)     Physical Exam   Constitutional: He is oriented to person, place, and time. He appears well-developed and well-nourished. Tearful   HENT:   Head: Atraumatic. Eyes: Conjunctivae and EOM are normal. No scleral icterus. Neck: Normal range of motion. Neck supple. Cardiovascular: Normal rate, regular rhythm and normal heart sounds. Exam reveals no gallop and no friction rub. No murmur heard. Pulmonary/Chest: Effort normal and breath sounds normal. No respiratory distress. Abdominal: Soft.  He exhibits no distension. There is no rebound and no guarding. Musculoskeletal: Normal range of motion. He exhibits no deformity. Neurological: He is alert and oriented to person, place, and time. Coordination normal.   Skin: Skin is warm and dry. Psychiatric: His speech is normal. He is not actively hallucinating. He expresses suicidal ideation. He expresses no homicidal ideation. He expresses no suicidal plans and no homicidal plans. He is attentive. Nursing note and vitals reviewed.       Diagnostic Study Results     Labs -     Recent Results (from the past 12 hour(s))   DRUG SCREEN, URINE    Collection Time: 10/08/19  5:50 AM   Result Value Ref Range    BENZODIAZEPINES NEGATIVE  NEG      BARBITURATES NEGATIVE  NEG      THC (TH-CANNABINOL) NEGATIVE  NEG      OPIATES NEGATIVE  NEG      PCP(PHENCYCLIDINE) NEGATIVE  NEG      COCAINE POSITIVE (A) NEG      AMPHETAMINES NEGATIVE  NEG      METHADONE NEGATIVE  NEG      HDSCOM (NOTE)    URINALYSIS W/ RFLX MICROSCOPIC    Collection Time: 10/08/19  5:50 AM   Result Value Ref Range    Color YELLOW      Appearance CLEAR      Specific gravity <1.005 (L) 1.005 - 1.030    pH (UA) 7.0 5.0 - 8.0      Protein NEGATIVE  NEG mg/dL    Glucose NEGATIVE  NEG mg/dL    Ketone NEGATIVE  NEG mg/dL    Bilirubin NEGATIVE  NEG      Blood NEGATIVE  NEG      Urobilinogen 0.2 0.2 - 1.0 EU/dL    Nitrites NEGATIVE  NEG      Leukocyte Esterase NEGATIVE  NEG     ETHYL ALCOHOL    Collection Time: 10/08/19  7:30 AM   Result Value Ref Range    ALCOHOL(ETHYL),SERUM <3 0 - 3 MG/DL   CBC WITH AUTOMATED DIFF    Collection Time: 10/08/19  7:30 AM   Result Value Ref Range    WBC 5.8 4.6 - 13.2 K/uL    RBC 5.04 4.70 - 5.50 M/uL    HGB 15.4 13.0 - 16.0 g/dL    HCT 43.9 36.0 - 48.0 %    MCV 87.1 74.0 - 97.0 FL    MCH 30.6 24.0 - 34.0 PG    MCHC 35.1 31.0 - 37.0 g/dL    RDW 13.3 11.6 - 14.5 %    PLATELET 347 896 - 610 K/uL    MPV 9.3 9.2 - 11.8 FL    NEUTROPHILS 50 40 - 73 %    LYMPHOCYTES 39 21 - 52 %    MONOCYTES 6 3 - 10 %    EOSINOPHILS 5 0 - 5 %    BASOPHILS 0 0 - 2 %    ABS. NEUTROPHILS 2.9 1.8 - 8.0 K/UL    ABS. LYMPHOCYTES 2.2 0.9 - 3.6 K/UL    ABS. MONOCYTES 0.4 0.05 - 1.2 K/UL    ABS. EOSINOPHILS 0.3 0.0 - 0.4 K/UL    ABS. BASOPHILS 0.0 0.0 - 0.1 K/UL    DF AUTOMATED     METABOLIC PANEL, COMPREHENSIVE    Collection Time: 10/08/19  7:30 AM   Result Value Ref Range    Sodium 139 136 - 145 mmol/L    Potassium 3.9 3.5 - 5.5 mmol/L    Chloride 103 100 - 111 mmol/L    CO2 31 21 - 32 mmol/L    Anion gap 5 3.0 - 18 mmol/L    Glucose 92 74 - 99 mg/dL    BUN 8 7.0 - 18 MG/DL    Creatinine 1.20 0.6 - 1.3 MG/DL    BUN/Creatinine ratio 7 (L) 12 - 20      GFR est AA >60 >60 ml/min/1.73m2    GFR est non-AA >60 >60 ml/min/1.73m2    Calcium 8.3 (L) 8.5 - 10.1 MG/DL    Bilirubin, total 0.5 0.2 - 1.0 MG/DL    ALT (SGPT) 21 16 - 61 U/L    AST (SGOT) 24 10 - 38 U/L    Alk. phosphatase 70 45 - 117 U/L    Protein, total 6.9 6.4 - 8.2 g/dL    Albumin 3.7 3.4 - 5.0 g/dL    Globulin 3.2 2.0 - 4.0 g/dL    A-G Ratio 1.2 0.8 - 1.7         Radiologic Studies -   No orders to display     CT Results  (Last 48 hours)    None        CXR Results  (Last 48 hours)    None          Medical Decision Making   I am the first provider for this patient. I reviewed the vital signs, available nursing notes, past medical history, past surgical history, family history and social history. Vital Signs-Reviewed the patient's vital signs. Records Reviewed: Nursing Notes and Old Medical Records     Procedures: None     Provider Notes (Medical Decision Making): Patient is a 57-year-old male with a history of cocaine use disorder who presents to the ED seeking inpatient help for his cocaine use disorder. Upon initial evaluation patient denied any suicidal or homicidal ideations. CBC, CMP, urine drug screen, urinalysis, ethyl alcohol level was ordered.   Crisis was called and stated that they would come down and give patient information regarding outpatient treatment. Patient was notified of this and was not happy with this answer so he changed his story saying that he is currently suicidal but does not have a plan. Crisis was then called again, and stated that they would come down to see the patient. 7:13 AM  UDS positive for cocaine. Urinalysis is normal.    8:34 AM  Patient evaluated by Crisis and it is determined that patient will be admitted for inpatient services due to his suicidal ideations. He has been medically cleared. MED RECONCILIATION:  No current facility-administered medications for this encounter. Current Outpatient Medications   Medication Sig    esomeprazole (NEXIUM) 20 mg capsule Take 1 Cap by mouth daily for 20 days.  sucralfate (CARAFATE) 1 gram tablet Take 1 Tab by mouth four (4) times daily for 20 days.  ondansetron (ZOFRAN ODT) 4 mg disintegrating tablet Take 1-2 tablets every 6-8 hours as needed for nausea and vomiting.  cloNIDine HCl (CATAPRES) 0.1 mg tablet Take 1 Tab by mouth two (2) times a day. Indications: high blood pressure    divalproex DR (DEPAKOTE) 500 mg tablet Take 1 Tab by mouth two (2) times a day. Indications: Bipolar disorder.  famotidine (PEPCID) 20 mg tablet Take 1 Tab by mouth two (2) times a day. Indications: heartburn    OLANZapine (ZYPREXA) 2.5 mg tablet Take 1 Tab by mouth every evening. Indications: mood disorder. Disposition:  Admit      Diagnosis     Clinical Impression:   1.  Suicidal ideations

## 2019-10-08 NOTE — BH NOTES
Patient has been visible and active in the day area during this shift. However, he has not wanted to attend groups. He has requested medications for his stomach, stating that he forgot to tell Dr. Negin Patrick when he spoke with her earlier. Phoned Dr. Negin Patrick and received orders for Protonix, Pepcid, and sucralfate. He also requested milk of magnesia. Pt received these orders. He stated his stomach hurt badly and was sure that he had an ulcer. Patient has been no behavioral problems and compliant with [facility] [place]. Unable to eat dinner, but did have his ensure. Denies current thoughts of self harm. Will continue to monitor for safety and support.

## 2019-10-08 NOTE — PROGRESS NOTES
NUTRITION    Nursing Referral: [facility]    RECOMMENDATIONS / PLAN:     - Continue current nutrition interventions. - Monitor and encourage po supplement intake. - Continue RD inpatient monitoring and evaluation. NUTRITION DIAGNOSIS & INTERVENTIONS:     - Meals/snacks: general healthy diet  - Medical food supplement therapy: Ensure NAV Fatima    Nutrition Diagnosis: Unintended weight loss related to inadequate energy intake associated with substance abuse as evidenced by -40 lbs, 18.1% x 6 months       ASSESSMENT:     Pt with hx of bipolar disorder and cocaine use, reports decreased meal intake/appetite PTA associated with substance abuse. Denies alcohol use. Significant weight loss, supplements ordered per protocol. Nutritional intake adequate to meet patients estimated nutritional needs:  Unable to determine at this time     Diet: DIET REGULAR  DIET NUTRITIONAL SUPPLEMENTS All Meals; ENSURE ENLIVE    Food Allergies: NKFA  Current Appetite: Fair  Appetite/meal intake prior to admission: Poor 1 meal/day; only eating when hungry with cocaine use  Feeding Limitations:  [] Swallowing Difficulty       [] Chewing Difficulty       [] Other   Current Meal Intake: No data found. Gastrointestinal Issues:  [] Yes    [x] No   Skin Integrity:  WDL    Pertinent Medications:  Reviewed   Labs:  Reviewed     Anthropometrics:  Ht Readings from Last 1 Encounters:   10/08/19 6' 2\" (1.88 m)       Last 3 Recorded Weights in this Encounter    10/08/19 0544 10/08/19 1111   Weight: 79.4 kg (175 lb) 81.6 kg (180 lb)       Body mass index is 23.11 kg/m². Weight History: Pt reports a UBW of 230 lbs x 6 months PTA with reported 60 lb weight loss. Per chart -40 lbs, 18.1% x 6 months.     Weight Metrics 10/8/2019 9/17/2019 8/18/2019 3/1/2019 12/5/2018 10/21/2018 2/5/2017   Weight 180 lb 199 lb 199 lb 220 lb 230 lb 224 lb 260 lb   BMI 23.11 kg/m2 25.55 kg/m2 25.55 kg/m2 28.25 kg/m2 29.53 kg/m2 28.76 kg/m2 33.38 kg/m2       Admitting Diagnosis: Bipolar disorder ([facility] [F31.9]  Past Medical History:   Diagnosis Date    Acid reflux     Asthma     Bipolar disorder, unspecified ([facility] 3/1/2019    Cocaine use disorder, severe, dependence ([facility]         Education Needs:        [x] None identified  [] Identified - Not appropriate at this time  []  Identified and addressed - refer to education log  Learning Limitations:   [x] None identified  [] Identified    Cultural, Yazidism & ethnic food preferences identified:  [x] None    [] Yes      ESTIMATED NUTRITION NEEDS:     2759-7041 kcal (MSJx1.2-1.4), 66-98 gm protein (0.8-1.2 gm/kg), 1 mL/kcal  Based on: 82 kg       [x] Actual BW      [] IBW          MONITORING & EVALUATION:     Nutrition Goal(s):   - PO nutrition intake will meet >75% of patient estimated nutritional needs within the next 7 days. Outcome: New/Initial goal     Monitor:  [x] Food and beverage intake   [x] Diet order   [x] Nutrition-focused physical findings   [] Weight      Previous Recommendations (for follow-up assessments only):    Not Applicable      Discharge Planning: No nutritional discharge needs at this time.     [x]  Participated in care planning, discharge planning, & interdisciplinary rounds as appropriate      Ethel Duff RD   Pager: [phone]

## 2019-10-09 VITALS
HEART RATE: 63 BPM | BODY MASS INDEX: 23.1 KG/M2 | WEIGHT: 180 LBS | DIASTOLIC BLOOD PRESSURE: 91 MMHG | SYSTOLIC BLOOD PRESSURE: 139 MMHG | TEMPERATURE: 98 F | HEIGHT: 74 IN | RESPIRATION RATE: 16 BRPM | OXYGEN SATURATION: 98 %

## 2019-10-09 PROCEDURE — 74011250637 HC RX REV CODE- 250/637: Performed by: PSYCHIATRY & NEUROLOGY

## 2019-10-09 PROCEDURE — 65220000003 HC RM SEMIPRIVATE PSYCH

## 2019-10-09 RX ADMIN — SUCRALFATE 1 G: 1 TABLET ORAL at 15:46

## 2019-10-09 RX ADMIN — SUCRALFATE 1 G: 1 TABLET ORAL at 08:01

## 2019-10-09 RX ADMIN — SUCRALFATE 1 G: 1 TABLET ORAL at 21:09

## 2019-10-09 RX ADMIN — DIVALPROEX SODIUM 500 MG: 500 TABLET, EXTENDED RELEASE ORAL at 20:09

## 2019-10-09 RX ADMIN — FAMOTIDINE 20 MG: 20 TABLET ORAL at 08:01

## 2019-10-09 RX ADMIN — SUCRALFATE 1 G: 1 TABLET ORAL at 11:25

## 2019-10-09 RX ADMIN — FAMOTIDINE 20 MG: 20 TABLET ORAL at 20:10

## 2019-10-09 RX ADMIN — PANTOPRAZOLE SODIUM 40 MG: 40 TABLET, DELAYED RELEASE ORAL at 08:02

## 2019-10-09 RX ADMIN — TRAZODONE HYDROCHLORIDE 50 MG: 50 TABLET ORAL at 20:11

## 2019-10-09 NOTE — GROUP NOTE
IP  GROUP DOCUMENTATION INDIVIDUAL Group Therapy Note Date: October 9 Group Start Time: 1100 Group End Time: 2919 Group Topic: Nursing [facility] ADULT CHEM DEP Dora Welsh RN 
 
[facility] GROUP DOCUMENTATION GROUP Group Therapy Note Attendees: 3 Attendance: Attended Patient's Goal:  To listen to information about flu vaccines Interventions/techniques: Informed Follows Directions: Followed directions Interactions: Interacted appropriately Mental Status: Calm Behavior/appearance: Cooperative Goals Achieved: Able to engage in interactions, Able to listen to others and Able to give feedback to another Additional Notes:  Patient is undecided on whether to take vaccine after information session.   
 
Sofía Sanchez RN

## 2019-10-09 NOTE — BSMART NOTE
OCCUPATIONAL THERAPY PROGRESS NOTE Group Time:  1500 Attendance: The patient attended full group. [place] Participation: The patient participated fully in the activity. Attention: The patient was able to focus on the activity. Interaction: The patient frequently interacts with others. Answers superficial with no actual disclosure.

## 2019-10-09 NOTE — BSMART NOTE
SW assessment/Intervention: Dipika Albert is a 38 y/o male admitted for SI in the setting of Cocaine use. No behavior issues overnight. Pt has been pleasant on the milieu today, feeling much better compared to yesterday. Patient is observed isolated in the milieu. Patient reports he just needed a couple of days. Patient reports continued use of cocaine and reports he is interested in treatment however, he is uninsured. SW will continue to monitor and will assist patient as needed.  
 
Darci Gandhi LCSW-E

## 2019-10-09 NOTE — BH NOTES
Interacts with staff and peers appropriately. Cooperative. Asks for ensure at dinner which is ordered. Offers no complaints. Gait appropriate. Gripper socks and 15 minute checks in place for safety. Takes medicines without incident. Does attend RN group. Will continue to monitor and support.

## 2019-10-09 NOTE — BH NOTES
Treatment team [facility] -     Medical Director: __x___present   Psychiatrist: __x___present   Charge nurse: _x____present   MSW: __x___present   : _____present   Nurse Manager: _____present   Student RNs: _____present   Medical Students: _x____present   Art Therapist: __x___present   Clinical Coordinator: __x___present    Occupational Therapist: __x___present   : _______ present  UR  ___x____ present  Crisis Supervisor____x___present      Plan of care discussed and updated as appropriate.

## 2019-10-09 NOTE — PROGRESS NOTES
Problem: Suicide  Goal: *STG: Remains safe in hospital  10/9/2019 1344 by Daaymi Sterling RN  Outcome: Progressing Towards Goal  Note:   Pt remains safe. 10/9/2019 1340 by Dayami Sterling RN  Outcome: Progressing Towards Goal  Note:   Pt remains safe. Patient has been in the milieu on and off all shift. Patient has been pleasant and cooperative. Patient today denies AH and states that he just needs help with his addiction. Patient states that he just cannot stop on his own despite wanting to. Patient endorses SI due to the addiction but denies AVH and HI.

## 2019-10-09 NOTE — BSMART NOTE
ART THERAPY GROUP PROGRESS NOTE PATIENT SCHEDULED FOR GROUP AT: 13:45 ATTENDANCE: 1/4 PARTICIPATION LEVEL: Participates fully in the art process ATTENTION LEVEL: Able to focus on task FOCUS: Identify emotions SYMBOLIC & THEMATIC CONTENT AS NOTED IN IMAGERY: He initially was unavailable, in the shower. He joined the last 1/4 of group. He was cheerful and polite. His responses were superficial with a scripted quality.

## 2019-10-09 NOTE — BSMART NOTE
10/09/19 1456 Group Therapy Group Social work 
(SOCIAL WORK GROUP- POSITIVE REFLECTIONS/SELF-AWARENESS TREE OF LIFE PROJECT) Number of participants 3 Time in 1241 Time out 1320 Total Time 39 MTP problem Interventions/techniques Informed Follows directions (NA) Interactions DECLINED TO PARTICIPATE Mental Status (NA) Behavior/appearance (NA) Long Term Risk of Suicide (NA) Immediate Risk for Suicide (NA) Suicide Risk Factors (NA) Suicide Protective Factors (NA) Risk of Violence (NA) Risk of Trauma (NA) Goals Achieved (NA)

## 2019-10-10 PROCEDURE — 74011250637 HC RX REV CODE- 250/637: Performed by: PSYCHIATRY & NEUROLOGY

## 2019-10-10 RX ORDER — DIVALPROEX SODIUM 500 MG/1
500 TABLET, DELAYED RELEASE ORAL 2 TIMES DAILY
Qty: 28 TAB | Refills: 0 | Status: ON HOLD | OUTPATIENT
Start: 2019-10-10 | End: 2019-11-12 | Stop reason: SDUPTHER

## 2019-10-10 RX ADMIN — SUCRALFATE 1 G: 1 TABLET ORAL at 07:58

## 2019-10-10 RX ADMIN — SUCRALFATE 1 G: 1 TABLET ORAL at 12:23

## 2019-10-10 RX ADMIN — PANTOPRAZOLE SODIUM 40 MG: 40 TABLET, DELAYED RELEASE ORAL at 08:47

## 2019-10-10 RX ADMIN — FAMOTIDINE 20 MG: 20 TABLET ORAL at 07:59

## 2019-10-10 RX ADMIN — HYDROXYZINE PAMOATE 50 MG: 50 CAPSULE ORAL at 03:48

## 2019-10-10 NOTE — BH NOTES
Patient discharged at this time with all belongings;verbalized understanding to follow up with Lili NARAYANAN. Indigent medication for Depakote prescription sent to inpatient pharmacy. Patient states he has an appointment he needs to make and will come to pick the medication up tomorrow. Patient declined offer for influenza shot. Provided bus ticket on request. Left unit at this time accompanied to front entrance by staff good spirits.

## 2019-10-10 NOTE — BSMART NOTE
SOL assessment/Intervention:  Patient is prepared for discharge. Denies SI/HI. Patient denies AVH. [facility].    
 
ELEONORA Aquino
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
## 2019-11-08 ENCOUNTER — HOSPITAL ENCOUNTER (INPATIENT)
Age: 39
LOS: 4 days | Discharge: HOME | DRG: 753 | End: 2019-11-12
Attending: EMERGENCY MEDICINE | Admitting: PSYCHIATRY & NEUROLOGY
Payer: MEDICAID

## 2019-11-08 ENCOUNTER — APPOINTMENT (OUTPATIENT)
Dept: GENERAL RADIOLOGY | Age: 39
DRG: 753 | End: 2019-11-08
Attending: EMERGENCY MEDICINE
Payer: MEDICAID

## 2019-11-08 DIAGNOSIS — R45.851 SUICIDAL THOUGHTS: ICD-10-CM

## 2019-11-08 DIAGNOSIS — F14.10 COCAINE ABUSE (HCC): Primary | ICD-10-CM

## 2019-11-08 DIAGNOSIS — S90.32XA CONTUSION OF LEFT FOOT INCLUDING TOES, INITIAL ENCOUNTER: ICD-10-CM

## 2019-11-08 DIAGNOSIS — S90.122A CONTUSION OF LEFT FOOT INCLUDING TOES, INITIAL ENCOUNTER: ICD-10-CM

## 2019-11-08 PROBLEM — F31.9 BIPOLAR DISORDER (HCC): Status: ACTIVE | Noted: 2019-11-08

## 2019-11-08 LAB
ALBUMIN SERPL-MCNC: 3.9 G/DL (ref 3.4–5)
ALBUMIN/GLOB SERPL: 1.1 {RATIO} (ref 0.8–1.7)
ALP SERPL-CCNC: 74 U/L (ref 45–117)
ALT SERPL-CCNC: 20 U/L (ref 16–61)
AMPHET UR QL SCN: NEGATIVE
ANION GAP SERPL CALC-SCNC: 1 MMOL/L (ref 3–18)
APPEARANCE UR: CLEAR
AST SERPL-CCNC: 17 U/L (ref 10–38)
BARBITURATES UR QL SCN: NEGATIVE
BASOPHILS # BLD: 0 K/UL (ref 0–0.1)
BASOPHILS NFR BLD: 0 % (ref 0–2)
BENZODIAZ UR QL: NEGATIVE
BILIRUB SERPL-MCNC: 0.5 MG/DL (ref 0.2–1)
BILIRUB UR QL: NEGATIVE
BUN SERPL-MCNC: 10 MG/DL (ref 7–18)
BUN/CREAT SERPL: 8 (ref 12–20)
CALCIUM SERPL-MCNC: 9.2 MG/DL (ref 8.5–10.1)
CANNABINOIDS UR QL SCN: POSITIVE
CHLORIDE SERPL-SCNC: 107 MMOL/L (ref 100–111)
CO2 SERPL-SCNC: 36 MMOL/L (ref 21–32)
COCAINE UR QL SCN: POSITIVE
COLOR UR: YELLOW
CREAT SERPL-MCNC: 1.2 MG/DL (ref 0.6–1.3)
DIFFERENTIAL METHOD BLD: ABNORMAL
EOSINOPHIL # BLD: 0.4 K/UL (ref 0–0.4)
EOSINOPHIL NFR BLD: 6 % (ref 0–5)
ERYTHROCYTE [DISTWIDTH] IN BLOOD BY AUTOMATED COUNT: 13.6 % (ref 11.6–14.5)
ETHANOL SERPL-MCNC: <3 MG/DL (ref 0–3)
GLOBULIN SER CALC-MCNC: 3.7 G/DL (ref 2–4)
GLUCOSE BLD STRIP.AUTO-MCNC: 77 MG/DL (ref 70–110)
GLUCOSE SERPL-MCNC: 57 MG/DL (ref 74–99)
GLUCOSE UR STRIP.AUTO-MCNC: NEGATIVE MG/DL
HCT VFR BLD AUTO: 49.1 % (ref 36–48)
HDSCOM,HDSCOM: ABNORMAL
HGB BLD-MCNC: 16.8 G/DL (ref 13–16)
HGB UR QL STRIP: NEGATIVE
KETONES UR QL STRIP.AUTO: NEGATIVE MG/DL
LEUKOCYTE ESTERASE UR QL STRIP.AUTO: NEGATIVE
LIPASE SERPL-CCNC: 114 U/L (ref 73–393)
LYMPHOCYTES # BLD: 1.7 K/UL (ref 0.9–3.6)
LYMPHOCYTES NFR BLD: 28 % (ref 21–52)
MCH RBC QN AUTO: 30.2 PG (ref 24–34)
MCHC RBC AUTO-ENTMCNC: 34.2 G/DL (ref 31–37)
MCV RBC AUTO: 88.2 FL (ref 74–97)
METHADONE UR QL: NEGATIVE
MONOCYTES # BLD: 0.5 K/UL (ref 0.05–1.2)
MONOCYTES NFR BLD: 9 % (ref 3–10)
NEUTS SEG # BLD: 3.6 K/UL (ref 1.8–8)
NEUTS SEG NFR BLD: 57 % (ref 40–73)
NITRITE UR QL STRIP.AUTO: NEGATIVE
OPIATES UR QL: NEGATIVE
PCP UR QL: NEGATIVE
PH UR STRIP: 6.5 [PH] (ref 5–8)
PLATELET # BLD AUTO: 283 K/UL (ref 135–420)
PMV BLD AUTO: 10.1 FL (ref 9.2–11.8)
POTASSIUM SERPL-SCNC: 4.5 MMOL/L (ref 3.5–5.5)
PROT SERPL-MCNC: 7.6 G/DL (ref 6.4–8.2)
PROT UR STRIP-MCNC: NEGATIVE MG/DL
RBC # BLD AUTO: 5.57 M/UL (ref 4.7–5.5)
SODIUM SERPL-SCNC: 144 MMOL/L (ref 136–145)
SP GR UR REFRACTOMETRY: 1.02 (ref 1–1.03)
UROBILINOGEN UR QL STRIP.AUTO: 1 EU/DL (ref 0.2–1)
WBC # BLD AUTO: 6.2 K/UL (ref 4.6–13.2)

## 2019-11-08 PROCEDURE — 65220000001 HC RM PRIVATE PSYCH

## 2019-11-08 PROCEDURE — 85025 COMPLETE CBC W/AUTO DIFF WBC: CPT

## 2019-11-08 PROCEDURE — 74011250636 HC RX REV CODE- 250/636: Performed by: EMERGENCY MEDICINE

## 2019-11-08 PROCEDURE — 73620 X-RAY EXAM OF FOOT: CPT

## 2019-11-08 PROCEDURE — 99285 EMERGENCY DEPT VISIT HI MDM: CPT

## 2019-11-08 PROCEDURE — 80053 COMPREHEN METABOLIC PANEL: CPT

## 2019-11-08 PROCEDURE — 74018 RADEX ABDOMEN 1 VIEW: CPT

## 2019-11-08 PROCEDURE — 96374 THER/PROPH/DIAG INJ IV PUSH: CPT

## 2019-11-08 PROCEDURE — 96375 TX/PRO/DX INJ NEW DRUG ADDON: CPT

## 2019-11-08 PROCEDURE — 82962 GLUCOSE BLOOD TEST: CPT

## 2019-11-08 PROCEDURE — 81003 URINALYSIS AUTO W/O SCOPE: CPT

## 2019-11-08 PROCEDURE — 83690 ASSAY OF LIPASE: CPT

## 2019-11-08 PROCEDURE — 74011250637 HC RX REV CODE- 250/637: Performed by: EMERGENCY MEDICINE

## 2019-11-08 PROCEDURE — 80307 DRUG TEST PRSMV CHEM ANLYZR: CPT

## 2019-11-08 RX ORDER — ONDANSETRON 2 MG/ML
4 INJECTION INTRAMUSCULAR; INTRAVENOUS
Status: COMPLETED | OUTPATIENT
Start: 2019-11-08 | End: 2019-11-08

## 2019-11-08 RX ORDER — DICYCLOMINE HYDROCHLORIDE 10 MG/1
20 CAPSULE ORAL
Status: COMPLETED | OUTPATIENT
Start: 2019-11-08 | End: 2019-11-08

## 2019-11-08 RX ORDER — KETOROLAC TROMETHAMINE 15 MG/ML
15 INJECTION, SOLUTION INTRAMUSCULAR; INTRAVENOUS
Status: COMPLETED | OUTPATIENT
Start: 2019-11-08 | End: 2019-11-08

## 2019-11-08 RX ADMIN — ONDANSETRON 4 MG: 2 INJECTION INTRAMUSCULAR; INTRAVENOUS at 21:38

## 2019-11-08 RX ADMIN — DICYCLOMINE HYDROCHLORIDE 20 MG: 10 CAPSULE ORAL at 21:38

## 2019-11-08 RX ADMIN — KETOROLAC TROMETHAMINE 15 MG: 15 INJECTION, SOLUTION INTRAMUSCULAR; INTRAVENOUS at 21:38

## 2019-11-08 NOTE — ED NOTES
Patient states he is here because he wants to stop using cocaine. He is also c/o abdominal pain. He states that every time he stops using cocaine he has stomach cramps.
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
## 2019-11-09 PROBLEM — F31.4 BIPOLAR I DISORDER, MOST RECENT EPISODE DEPRESSED, SEVERE WITHOUT PSYCHOTIC FEATURES (HCC): Status: ACTIVE | Noted: 2019-11-08

## 2019-11-09 PROCEDURE — 65220000003 HC RM SEMIPRIVATE PSYCH

## 2019-11-09 PROCEDURE — 74011250637 HC RX REV CODE- 250/637: Performed by: PSYCHIATRY & NEUROLOGY

## 2019-11-09 RX ORDER — FAMOTIDINE 20 MG/1
20 TABLET, FILM COATED ORAL 2 TIMES DAILY
Status: DISCONTINUED | OUTPATIENT
Start: 2019-11-09 | End: 2019-11-12 | Stop reason: HOSPADM

## 2019-11-09 RX ORDER — TRAZODONE HYDROCHLORIDE 50 MG/1
50 TABLET ORAL
Status: DISCONTINUED | OUTPATIENT
Start: 2019-11-09 | End: 2019-11-12 | Stop reason: HOSPADM

## 2019-11-09 RX ORDER — TRAZODONE HYDROCHLORIDE 50 MG/1
50 TABLET ORAL
COMMUNITY
End: 2019-11-12

## 2019-11-09 RX ORDER — DIVALPROEX SODIUM 250 MG/1
500 TABLET, DELAYED RELEASE ORAL 2 TIMES DAILY
Status: DISCONTINUED | OUTPATIENT
Start: 2019-11-09 | End: 2019-11-12 | Stop reason: HOSPADM

## 2019-11-09 RX ORDER — HYDROXYZINE PAMOATE 25 MG/1
50 CAPSULE ORAL
Status: DISCONTINUED | OUTPATIENT
Start: 2019-11-09 | End: 2019-11-12 | Stop reason: HOSPADM

## 2019-11-09 RX ADMIN — FAMOTIDINE 20 MG: 20 TABLET, FILM COATED ORAL at 08:58

## 2019-11-09 RX ADMIN — FAMOTIDINE 20 MG: 20 TABLET, FILM COATED ORAL at 20:09

## 2019-11-09 RX ADMIN — DIVALPROEX SODIUM 500 MG: 250 TABLET, DELAYED RELEASE ORAL at 20:08

## 2019-11-09 RX ADMIN — DIVALPROEX SODIUM 500 MG: 250 TABLET, DELAYED RELEASE ORAL at 08:58
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
## 2019-11-09 NOTE — BSMART NOTE
36 yo male pt interviewed in ED room 5 at the request of Dr Ade Macias. Pt resting quietly on stretcher upon my arrival. Reports coming to the ED related to cocaine use and suicidal thoughts. Still endorsing s/i with no plan. Denies h/i a/vh. Reports using cocaine daily with last use being yesterday. \"I cramp bad when I come off of it too. \" also reports marijauna use. Denies alcohol use. Denies legal issues. Reports being homeless and having very little support. Denies taking prescribed medications as he should \"I only took them a couple of days since I left here last\" Last here 10-8-19. Pt is agreeable to inpatient psychiatric treatment and \"I [facility] Dr Shah and Darcy notified of my assessment findings.

## 2019-11-09 NOTE — BH NOTES
Pt is a new admission; arrived at approximately 0405. Pt appeared to have slept for 1.75 hours; appears to be sleeping at this time. Will continue to monitor for safety.

## 2019-11-09 NOTE — BH NOTES
The patient has been up in milieu all evening. He has been watching television. He is alert and oriented x3. He denies that there are thoughts of harming himself or others. His appetite has been good. He has been compliant with his medication regimen. Will continue to monitor and will continue to provide support.
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
## 2019-11-09 NOTE — GROUP NOTE
[facility] GROUP DOCUMENTATION INDIVIDUAL Group Therapy Note Date: November 9 Group Start Time: 5973 Group End Time: 1100 Group Topic: Nursing [facility] 1 ADULT CHEM DEP Dewey Hylton RN 
 
[facility] GROUP DOCUMENTATION GROUP Group Therapy Note Attendees: 7 Attendance: Did not attend Additional Notes:  Encouraged patient to attend group, patient stated that he wanted to rest. Will continue to provide and promote group activities Vilma Gibbons RN

## 2019-11-09 NOTE — BH NOTES
MHT Note:  The Pt was present in his room in the bed and in the day area equally during the shift. The Pt did eat both of his meals and did take his medication as ordered. The Pt did not have any behavioral issues with his peers or staff during the shift. The Pt did not have any visitors, but was seen on the phone during the shift. The Pt did attend group and participated fully.   The Pt was reminded to keep his socks and/or shoes on to avoid slips and/or falls while in the hospital.

## 2019-11-10 PROCEDURE — 74011250637 HC RX REV CODE- 250/637: Performed by: PSYCHIATRY & NEUROLOGY

## 2019-11-10 PROCEDURE — 65220000003 HC RM SEMIPRIVATE PSYCH

## 2019-11-10 RX ORDER — ACETAMINOPHEN 500 MG
2 TABLET ORAL
Status: DISCONTINUED | OUTPATIENT
Start: 2019-11-10 | End: 2019-11-12 | Stop reason: HOSPADM

## 2019-11-10 RX ADMIN — NICOTINE POLACRILEX 2 MG: 2 GUM, CHEWING ORAL at 12:12

## 2019-11-10 RX ADMIN — FAMOTIDINE 20 MG: 20 TABLET, FILM COATED ORAL at 20:15

## 2019-11-10 RX ADMIN — NICOTINE POLACRILEX 2 MG: 2 GUM, CHEWING ORAL at 17:43

## 2019-11-10 RX ADMIN — DIVALPROEX SODIUM 500 MG: 250 TABLET, DELAYED RELEASE ORAL at 08:33

## 2019-11-10 RX ADMIN — DIVALPROEX SODIUM 500 MG: 250 TABLET, DELAYED RELEASE ORAL at 20:15

## 2019-11-10 RX ADMIN — TRAZODONE HYDROCHLORIDE 50 MG: 50 TABLET ORAL at 20:15

## 2019-11-10 RX ADMIN — FAMOTIDINE 20 MG: 20 TABLET, FILM COATED ORAL at 08:33

## 2019-11-10 NOTE — PROGRESS NOTES
The pt was seen in psych rounds today, case discussed with staff. The pt is doing better today. He appears less depressed, and is tolerating meds well. 24-Hr Vitals/Weight (last day)     Date/Time Temp Pulse BP MAP (Calculated) BP 1 Location BP Patient Position Resp SpO2 O2 Device O2 Flow Rate (L/min) Level of Consciousness MEWS Score Weight   11/10/19 0855 97.8 °F (36.6 °C) 88 102/75 84 Left arm At rest 18    Alert 1    11/09/19 0900 97.1 °F (36.2 °C) 70 141/87 105 Right arm Sitting 18    Alert 1    11/09/19 0400 98.7 °F (37.1 °C) 79 140/90 107 Right arm Sitting 18    Alert 1    11/09/19 0107 98 °F (36.7 °C) 98 142/90 107 Left arm  16 100 % Room air  Alert 1      Vitals are stable. Dr. Samuel Diego will see tomorrow. Same tx plan for now.

## 2019-11-10 NOTE — BH NOTES
Pt appeared to have slept for 6 hours. Pt appears to be sleeping at this time. Will continue to monitor for safety.

## 2019-11-10 NOTE — GROUP NOTE
IP  GROUP DOCUMENTATION INDIVIDUAL Group Therapy Note Date: November 10 Group Start Time: 4060 Group End Time: 6667 Group Topic: Nursing [facility] 1 ADULT CHEM DEP Maine Merchant RN 
 
[facility] GROUP DOCUMENTATION GROUP Group Therapy Note Attendees: 7 Attendance: Did not attend Lolis Fowler RN

## 2019-11-10 NOTE — BSMART NOTE
ART THERAPY GROUP PROGRESS NOTE Group time: 9:20 The patient did not awaken/get up when called for group.

## 2019-11-10 NOTE — GROUP NOTE
[facility]  GROUP DOCUMENTATION INDIVIDUAL Group Therapy Note Date: November 9 Group Start Time: 2000 Group End Time: 2015 Group Topic: Nursing [facility] 1 ADULT CHEM DEP NORAH Cantu  GROUP DOCUMENTATION GROUP Group Therapy Note Attendees:  6 Attendance: Did not attend Irma Song RN

## 2019-11-10 NOTE — GROUP NOTE
IP  GROUP DOCUMENTATION INDIVIDUAL Group Therapy Note Date: November 10 Group Start Time: 0514 Group End Time: 1643 Group Topic: Nursing [facility] 1 ADULT CHEM DEP Jennifer Cabrera RN 
 
[facility] GROUP DOCUMENTATION GROUP Group Therapy Note Attendees: 6 Attendance: Attended Interventions/techniques: Validated, Promoted peer support and Supported Follows Directions: Followed directions Interactions: Interacted appropriately Mental Status: Calm Behavior/appearance: Attentive and Cooperative Goals Achieved: Able to engage in interactions, Able to listen to others, Able to receive feedback, Able to self-disclose and Discussed discharge plans Additional Notes:  Patient actively participated in nursing group which discussed discharge planning Shobha Davenport RN

## 2019-11-10 NOTE — PROGRESS NOTES
Problem: Suicide  Goal: *STG: Remains safe in hospital  Description  Patient to remain safe every day while hospitalized. Outcome: Progressing Towards Goal  Note:   Patient will remain safe for duration of hospital stay  Goal: *STG: Attends activities and groups  Description  Patient to attend 2-3 group therapy every day while hospitalized. Outcome: Progressing Towards Goal  Note:   Patient will attend activities and groups while in hospital     Problem: Crack/Cocaine Withdrawal  Goal: *STG: Complies with medication therapy  Description  Patient to take medications as prescribed every day while hospitalized. Outcome: Progressing Towards Goal  Note:   Patient will comply with medication therapy while in hospital     Patient has been active and visible in dayroom throughout shift. Patient has been compliant with medications and ate 100% of meals. Patient has attended group activities and interacts well with staff and peers. Patient contracts for safety and denies any SI/HI or A/VH. Will continue to monitor and provide a safe and supportive environment.
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
## 2019-11-11 PROBLEM — F31.5 BIPOLAR I DISORDER, CURRENT OR MOST RECENT EPISODE DEPRESSED, WITH PSYCHOTIC FEATURES (HCC): Status: RESOLVED | Noted: 2019-08-20 | Resolved: 2019-11-11

## 2019-11-11 PROCEDURE — 65220000003 HC RM SEMIPRIVATE PSYCH

## 2019-11-11 PROCEDURE — 74011250637 HC RX REV CODE- 250/637: Performed by: PSYCHIATRY & NEUROLOGY

## 2019-11-11 RX ADMIN — HYDROXYZINE PAMOATE 50 MG: 25 CAPSULE ORAL at 00:11

## 2019-11-11 RX ADMIN — TRAZODONE HYDROCHLORIDE 50 MG: 50 TABLET ORAL at 20:56

## 2019-11-11 RX ADMIN — DIVALPROEX SODIUM 500 MG: 250 TABLET, DELAYED RELEASE ORAL at 20:56

## 2019-11-11 RX ADMIN — FAMOTIDINE 20 MG: 20 TABLET, FILM COATED ORAL at 09:11

## 2019-11-11 RX ADMIN — DIVALPROEX SODIUM 500 MG: 250 TABLET, DELAYED RELEASE ORAL at 09:11

## 2019-11-11 RX ADMIN — FAMOTIDINE 20 MG: 20 TABLET, FILM COATED ORAL at 20:56

## 2019-11-11 NOTE — BH NOTES
Pt appeared to have slept for 6.50 hours. He c/o of having a difficult time staying asleep;adminstered meds. by the RN. Pt appears to be sleeping at this time. Will continue to monitor for safety.

## 2019-11-11 NOTE — BH NOTES
Pt asked if cell phone and 7.00 dollars was in his locker.  MHT and Charged Nurse checked locker 126/3, the only thing in the locker was a coat and shoe strings,  no cell phone or money was in locker 126/3

## 2019-11-11 NOTE — GROUP NOTE
[facility] GROUP DOCUMENTATION INDIVIDUAL Group Therapy Note Date: November 11 Group Start Time: 46 Group End Time: 1710 Group Topic: Nursing [facility] 1 ADULT CHEM Cari Mccall, RN 
 
[facility] GROUP DOCUMENTATION GROUP Group Therapy Note Attendees: 4 Attendance: Attended Patient's Goal:  Recreational therapy Interventions/techniques: Informed and Provide feedback Follows Directions: Followed directions Interactions: Interacted appropriately Mental Status: Calm Behavior/appearance: Attentive Goals Achieved: Able to engage in interactions and Able to listen to others Additional Notes:   
 
Osmar Reyes RN

## 2019-11-11 NOTE — BH NOTES
Pt has been needy and demanding this shift. Pt mood is labile. Pt behavior is argumentive. Insight is poor when asking about his illness. Pt judgement is poor. Pt received a visit from a female friend. Pt sent home his black suitcase that was located in the pt closet, and  one of two pair of sneakers. Staff will continue to monitor patient for behavior safety and location.

## 2019-11-11 NOTE — BH NOTES
It was brought to this writer's attention that Pt cell phone and $7.00 were not observed to be in his locker. This writer personally logged/secured Pt personal belongings on 11/9/2019 at 0430 and informed the Pt of the location of each of his items. Items placed in his locker (126/3), consisted of a pair of peach shoes, black cell phone, toiletries, white lighter, silver tone lighter, a beige/brown jacket,tolietries,  and $7.00. Other items logged, consisted of a black suitcase/bag; which was placed in the storage closet a silver tone watch; which was sent to security and black boxers, white t-shirt, black and white sweater, blue jeans, and white socks; which were given back to the Pt. The evening shift states that his suitcase was sent with a visitor today and the shoes/toiletries, from his locker, were given to him. When this writer looked in the patient's locker, upon arrival, the only items observed to be in there were a pair of shoelaces and his jacket. This writer did not see his cell phone, lighters, belt, or $7.00. This writer also checked the remaining lockers and surrounding areas to no avail. Pt states that the cell phone was a touch screen Tracfone.

## 2019-11-11 NOTE — PROGRESS NOTES
Problem: Falls - Risk of  Goal: *Absence of Falls  Description  Patient to be absence of falls every day while hospitalized. Outcome: Progressing Towards Goal  Note:   Fall Risk Interventions:  Medication Interventions: Teach patient to arise slowly  Pt remains free of falls. Problem: Suicide  Goal: *STG: Attends activities and groups  Description  Patient to attend 2-3 group therapy every day while hospitalized. Outcome: Not Progressing Towards Goal  Note:   Pt is not attending groups. Problem: Crack/Cocaine Withdrawal  Goal: *STG: Complies with medication therapy  Description  Patient to take medications as prescribed every day while hospitalized. Outcome: Progressing Towards Goal  Note:   Pt takes medications as ordered. Patient has been focused on his cell phone being missing and is concerned with that first in order to determine when he can discharge as he does not want to end up on the street with no phone. Patient is pleasant and cooperative and denies SI/HI and AVH. Patient is medication compliant and expresses no somatic complaints thus far.

## 2019-11-11 NOTE — BH NOTES
Pt requested and received vistaril 50 mg. Po for restlessness and anxiety. He states he has a sore throat \" or reflux\" .  Encourage to discuss with MD.

## 2019-11-11 NOTE — BH NOTES
Patient complained of chest pain at 2015. Vitals 98.8-76-/%. Patient asking writer about ring while complaining of chest pain. Writer administered night  Medication. Patient verbalized relief after 5 minutes. Patient ate sandwich and chips.

## 2019-11-11 NOTE — PROGRESS NOTES
[address]  Inpatient Progress Note     Date of Service: 11/11/19  Hospital Day: 3     Subjective/Interval History   11/11/19    Treatment Team Notes:  Notes reviewed and/or discussed and report that Nisreen Ann is a 36 y/o male admitted for SI in the setting of Cocaine abuse. No behavior issues overnight. Pt is focused on the fact that his cell phone and money is missing. Patient interview: Nisreen Ann was interviewed by this writer today. Reviewed events leading to hospitalization. Pt states he relapsed on Cocaine due to going back to familiar people, places and things. He was to return to his sister's house after last discharge from here but he states he had to go back to the streets because his sister was not able to accomodate him in her house. He states that his wife has found them a place to live far from where the drugs are so his hoping to be able to maintain sobriety when he moves away. He is not motivated to go to long term rehab treatment as he says his wife is his main support system and  He needs to be with her as they move to their new living arrangement. He denies SI, HI or hallucinations. His mood is euthymic and he is not psychotic. He is concerned about his missing phone as he does not want to be discharged without his phone. Objective     Visit Vitals  /83 (BP 1 Location: Left arm, BP Patient Position: At rest)   Pulse 76   Temp 98.8 °F (37.1 °C)   Resp 18   Ht 6' 2\" (1.88 m)   Wt 75.3 kg (166 lb)   SpO2 100%   BMI 21.31 kg/m²       No results found for this or any previous visit (from the past 24 hour(s)). Mental Status Examination     Appearance/Hygiene 37 y.o.  BLACK OR  male  Hygiene: good   Behavior/Social Relatedness Appropriate   Musculoskeletal Gait/Station: appropriate  Tone (flaccid, cogwheeling, spastic): not assessed  Psychomotor (hyperkinetic, hypokinetic): calm   Involuntary movements (tics, dyskinesias, akathisa, stereotypies): none   Speech   Rate, rhythm, volume, fluency and articulation are appropriate   Mood   euthymic   Affect    congruent   Thought Process Linear and goal directed   Thought Content and Perceptual Disturbances Denies self-injurious behavior (SIB), suicidal ideation (SI), aggressive behavior or homicidal ideation (HI)    Denies auditory and visual hallucinations   Sensorium and Cognition  Grossly intact   Insight  fair   Judgment limited        Assessment/Plan      Psychiatric Diagnoses:   Patient Active Problem List   Diagnosis Code    Cocaine use disorder, severe, dependence ([facility] [address] F14.20    Bipolar I disorder, most recent episode depressed, severe without psychotic features ([facility] [address] F31.4       Psychosocial and contextual factors: unstable housing, chronic addiction    Level of impairment/disability: moderate    Merly De Jesus is a 37 y.o. who is currently admitted for SI but is doing better. 1.  Continue current treatment plan. Pt is not willing to go to long term rehab facility for treatment. 2. Possible discharge tomorrow.     Ahsan Nguyễn MD DR. [facility]  Psychiatry

## 2019-11-11 NOTE — BSMART NOTE
OCCUPATIONAL THERAPY PROGRESS NOTE Group Time:  1364 Attendance: The patient attended full group. Participation: The patient participated fully in the activity. Attention: The patient was able to focus on the activity. Interaction: The patient frequently interacts with others. Superficial in responses. Attentive to peers in attempting to be \"helpful\".

## 2019-11-12 VITALS
HEART RATE: 70 BPM | WEIGHT: 166 LBS | RESPIRATION RATE: 18 BRPM | OXYGEN SATURATION: 100 % | SYSTOLIC BLOOD PRESSURE: 140 MMHG | TEMPERATURE: 96.8 F | HEIGHT: 74 IN | BODY MASS INDEX: 21.3 KG/M2 | DIASTOLIC BLOOD PRESSURE: 69 MMHG

## 2019-11-12 PROCEDURE — 74011250637 HC RX REV CODE- 250/637: Performed by: PSYCHIATRY & NEUROLOGY

## 2019-11-12 RX ORDER — DIVALPROEX SODIUM 500 MG/1
500 TABLET, DELAYED RELEASE ORAL 2 TIMES DAILY
Qty: 60 TAB | Refills: 0 | Status: SHIPPED | OUTPATIENT
Start: 2019-11-12 | End: 2020-01-27

## 2019-11-12 RX ADMIN — NICOTINE POLACRILEX 2 MG: 2 GUM, CHEWING ORAL at 08:27

## 2019-11-12 RX ADMIN — FAMOTIDINE 20 MG: 20 TABLET, FILM COATED ORAL at 08:17
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
## 2019-11-12 NOTE — BH NOTES
Darlin Dozier  appears calm and cooperative in the milieu interacting with staff and peers. Pt. denies SI,HI and AVH today. Pt contracts for safety on the unit. Pt.denies any new medical/pain complaints. Pt. did not have any visitors. Staff encouraged Pt. to participate in treatment plan. Pt agreed. Staff will continue to monitor Pt. for behavior safety and location.

## 2019-11-12 NOTE — DISCHARGE INSTRUCTIONS
Patient armband removed and shredded      BEHAVIORAL HEALTH NURSING DISCHARGE NOTE      The following personal items collected during your admission are returned to you:   Dental Appliance: Dental Appliances: None  Vision: Visual Aid: None  Hearing Aid:    Jewelry: Jewelry: Watch(silver tone)  Clothing: Clothing: Belt, Footwear, Jacket/Coat, Pants, Shirt, Socks, Undergarments, Sweater  Other Valuables: Other Valuables: Lighter/matches, Money (comment), Cell Phone, Personal toiletries($7.00)  Valuables sent to safe: Personal Items Sent to Safe: Watch      PATIENT INSTRUCTIONS:    Important numbers to remember given to patient  The discharge information has been reviewed with the patient. The patient verbalized understanding.

## 2019-11-12 NOTE — BH NOTES
Discharged patient with all personal belongings and discharge instructions.  Patient acknowledged all instructions with no further instructions

## 2020-01-25 ENCOUNTER — HOSPITAL ENCOUNTER (INPATIENT)
Age: 40
LOS: 2 days | Discharge: HOME | DRG: 753 | End: 2020-01-27
Attending: EMERGENCY MEDICINE | Admitting: PSYCHIATRY & NEUROLOGY
Payer: MEDICAID

## 2020-01-25 DIAGNOSIS — R45.851 SUICIDAL THOUGHTS: Primary | ICD-10-CM

## 2020-01-25 DIAGNOSIS — F32.89 OTHER DEPRESSION: ICD-10-CM

## 2020-01-25 DIAGNOSIS — F14.10 COCAINE ABUSE (HCC): ICD-10-CM

## 2020-01-25 PROBLEM — F31.4 BIPOLAR 1 DISORDER, DEPRESSED, SEVERE (HCC): Status: ACTIVE | Noted: 2020-01-25

## 2020-01-25 LAB
AMPHET UR QL SCN: NEGATIVE
ANION GAP SERPL CALC-SCNC: 1 MMOL/L (ref 3–18)
BARBITURATES UR QL SCN: NEGATIVE
BASOPHILS # BLD: 0 K/UL (ref 0–0.1)
BASOPHILS NFR BLD: 0 % (ref 0–2)
BENZODIAZ UR QL: NEGATIVE
BUN SERPL-MCNC: 17 MG/DL (ref 7–18)
BUN/CREAT SERPL: 16 (ref 12–20)
CALCIUM SERPL-MCNC: 9 MG/DL (ref 8.5–10.1)
CANNABINOIDS UR QL SCN: POSITIVE
CHLORIDE SERPL-SCNC: 109 MMOL/L (ref 100–111)
CO2 SERPL-SCNC: 32 MMOL/L (ref 21–32)
COCAINE UR QL SCN: POSITIVE
CREAT SERPL-MCNC: 1.09 MG/DL (ref 0.6–1.3)
DIFFERENTIAL METHOD BLD: ABNORMAL
EOSINOPHIL # BLD: 0.4 K/UL (ref 0–0.4)
EOSINOPHIL NFR BLD: 5 % (ref 0–5)
ERYTHROCYTE [DISTWIDTH] IN BLOOD BY AUTOMATED COUNT: 13.4 % (ref 11.6–14.5)
ETHANOL SERPL-MCNC: <3 MG/DL (ref 0–3)
GLUCOSE SERPL-MCNC: 74 MG/DL (ref 74–99)
HCT VFR BLD AUTO: 47.6 % (ref 36–48)
HDSCOM,HDSCOM: ABNORMAL
HGB BLD-MCNC: 16.2 G/DL (ref 13–16)
LYMPHOCYTES # BLD: 2.1 K/UL (ref 0.9–3.6)
LYMPHOCYTES NFR BLD: 25 % (ref 21–52)
MCH RBC QN AUTO: 30.6 PG (ref 24–34)
MCHC RBC AUTO-ENTMCNC: 34 G/DL (ref 31–37)
MCV RBC AUTO: 90 FL (ref 74–97)
METHADONE UR QL: NEGATIVE
MONOCYTES # BLD: 0.7 K/UL (ref 0.05–1.2)
MONOCYTES NFR BLD: 8 % (ref 3–10)
NEUTS SEG # BLD: 5.2 K/UL (ref 1.8–8)
NEUTS SEG NFR BLD: 62 % (ref 40–73)
OPIATES UR QL: NEGATIVE
PCP UR QL: NEGATIVE
PLATELET # BLD AUTO: 241 K/UL (ref 135–420)
PMV BLD AUTO: 9.5 FL (ref 9.2–11.8)
POTASSIUM SERPL-SCNC: 4.1 MMOL/L (ref 3.5–5.5)
RBC # BLD AUTO: 5.29 M/UL (ref 4.7–5.5)
SODIUM SERPL-SCNC: 142 MMOL/L (ref 136–145)
WBC # BLD AUTO: 8.4 K/UL (ref 4.6–13.2)

## 2020-01-25 PROCEDURE — 80048 BASIC METABOLIC PNL TOTAL CA: CPT

## 2020-01-25 PROCEDURE — 65220000005 HC RM SEMIPRIVATE PSYCH 3 OR 4 BED

## 2020-01-25 PROCEDURE — 80307 DRUG TEST PRSMV CHEM ANLYZR: CPT

## 2020-01-25 PROCEDURE — 74011250637 HC RX REV CODE- 250/637: Performed by: PSYCHIATRY & NEUROLOGY

## 2020-01-25 PROCEDURE — 85025 COMPLETE CBC W/AUTO DIFF WBC: CPT

## 2020-01-25 PROCEDURE — 99285 EMERGENCY DEPT VISIT HI MDM: CPT

## 2020-01-25 RX ORDER — HALOPERIDOL 5 MG/1
5 TABLET ORAL
Status: DISCONTINUED | OUTPATIENT
Start: 2020-01-25 | End: 2020-01-27 | Stop reason: HOSPADM

## 2020-01-25 RX ORDER — IBUPROFEN 600 MG/1
600 TABLET ORAL
Status: DISCONTINUED | OUTPATIENT
Start: 2020-01-25 | End: 2020-01-27 | Stop reason: HOSPADM

## 2020-01-25 RX ORDER — HYDROXYZINE PAMOATE 50 MG/1
50 CAPSULE ORAL
Status: DISCONTINUED | OUTPATIENT
Start: 2020-01-25 | End: 2020-01-27 | Stop reason: HOSPADM

## 2020-01-25 RX ORDER — DIVALPROEX SODIUM 250 MG/1
500 TABLET, DELAYED RELEASE ORAL 2 TIMES DAILY
Status: DISCONTINUED | OUTPATIENT
Start: 2020-01-25 | End: 2020-01-27 | Stop reason: HOSPADM

## 2020-01-25 RX ORDER — FAMOTIDINE 20 MG/1
20 TABLET, FILM COATED ORAL 2 TIMES DAILY
Status: DISCONTINUED | OUTPATIENT
Start: 2020-01-25 | End: 2020-01-27 | Stop reason: HOSPADM

## 2020-01-25 RX ORDER — HALOPERIDOL 5 MG/ML
5 INJECTION INTRAMUSCULAR
Status: DISCONTINUED | OUTPATIENT
Start: 2020-01-25 | End: 2020-01-27 | Stop reason: HOSPADM

## 2020-01-25 RX ORDER — TRAZODONE HYDROCHLORIDE 50 MG/1
50 TABLET ORAL
Status: DISCONTINUED | OUTPATIENT
Start: 2020-01-25 | End: 2020-01-27 | Stop reason: HOSPADM

## 2020-01-25 RX ADMIN — TRAZODONE HYDROCHLORIDE 50 MG: 50 TABLET ORAL at 20:44

## 2020-01-25 RX ADMIN — DIVALPROEX SODIUM 500 MG: 250 TABLET, DELAYED RELEASE ORAL at 20:44

## 2020-01-25 RX ADMIN — FAMOTIDINE 20 MG: 20 TABLET, FILM COATED ORAL at 20:44

## 2020-01-25 RX ADMIN — CARIPRAZINE 1.5 MG: 1.5 CAPSULE, GELATIN COATED ORAL at 21:41

## 2020-01-25 NOTE — ED PROVIDER NOTES
EMERGENCY DEPARTMENT HISTORY AND PHYSICAL EXAM    11:58 AM      Date: 1/25/2020  Patient Name: Corine Sheikh    History of Presenting Illness     No chief complaint on file. History Provided By: Patient    Additional History (Context): Corine Sheikh is a 37 y.o. male with Past medical history of bipolar disorder, asthma, cocaine abuse who presents with chief complaint of suicidal thoughts. Patient states that he needs help getting off of crack cocaine. He reports that he has been using for a long time and is been admitted in the past to behavioral health. [facility]. He also reports that he has been depressed. Denies any current plan, no homicidal ideation, hallucinations, vomiting, cough, fever, and no other complaint. PCP: None        Past History     Past Medical History:  Past Medical History:   Diagnosis Date    Acid reflux     Asthma     Bipolar disorder, unspecified ([facility] [address] 3/1/2019    Cocaine use disorder, severe, dependence ([facility] [address]        Past Surgical History:  No past surgical history on file. Family History:  No family history on file. Social History:  Social History     Tobacco Use    Smoking status: Current Every Day Smoker     Packs/day: 1.00    Smokeless tobacco: Never Used   Substance Use Topics    Alcohol use: Not Currently    Drug use: Yes     Types: Cocaine       Allergies:  No Known Allergies      Review of Systems       Review of Systems   Constitutional: Negative for chills and fever. HENT: Negative for congestion, rhinorrhea, sore throat and trouble swallowing. Eyes: Negative for visual disturbance. Respiratory: Negative for cough, shortness of breath and wheezing. Cardiovascular: Negative for chest pain and leg swelling. Gastrointestinal: Negative for abdominal pain, nausea and vomiting. Endocrine: Negative for polyuria. Genitourinary: Negative for difficulty urinating and dysuria. Musculoskeletal: Negative for arthralgias and neck stiffness.    Skin: Negative for rash. Neurological: Negative for dizziness, weakness, numbness and headaches. Hematological: Does not bruise/bleed easily. Psychiatric/Behavioral: Positive for dysphoric mood and suicidal ideas. Negative for confusion. All other systems reviewed and are negative. Physical Exam     Visit Vitals  /81 (BP 1 Location: Left arm, BP Patient Position: Sitting)   Pulse 72   Temp 97.3 °F (36.3 °C)   Resp 18   Ht 6' 2\" (1.88 m)   Wt 85.7 kg (189 lb)   SpO2 99%   BMI 24.27 kg/m²         Physical Exam  Vitals signs and nursing note reviewed. Constitutional:       General: He is not in acute distress. Appearance: He is well-developed. He is not diaphoretic. HENT:      Head: Normocephalic and atraumatic. Eyes:      General: No scleral icterus. Conjunctiva/sclera: Conjunctivae normal.      Pupils: Pupils are equal, round, and reactive to light. Neck:      Musculoskeletal: Normal range of motion and neck supple. Cardiovascular:      Rate and Rhythm: Normal rate. Heart sounds: Normal heart sounds. Comments: Capillary refill < 3 seconds  Pulmonary:      Effort: Pulmonary effort is normal. No respiratory distress. Breath sounds: Normal breath sounds. No wheezing or rales. Abdominal:      General: Bowel sounds are normal. There is no distension. Palpations: Abdomen is soft. Tenderness: There is no tenderness. Musculoskeletal: Normal range of motion. Lymphadenopathy:      Cervical: No cervical adenopathy. Skin:     General: Skin is warm and dry. Neurological:      Mental Status: He is alert and oriented to person, place, and time. Cranial Nerves: No cranial nerve deficit. Psychiatric:         Mood and Affect: Mood is depressed. Speech: Speech normal.         Behavior: Behavior is cooperative. Thought Content: Thought content includes suicidal ideation. Cognition and Memory: Memory is not impaired.            Diagnostic Study Results     Labs -  Recent Results (from the past 12 hour(s))   DRUG SCREEN, URINE    Collection Time: 01/25/20 10:48 AM   Result Value Ref Range    BENZODIAZEPINES NEGATIVE  NEG      BARBITURATES NEGATIVE  NEG      THC (TH-CANNABINOL) POSITIVE (A) NEG      OPIATES NEGATIVE  NEG      PCP(PHENCYCLIDINE) NEGATIVE  NEG      COCAINE POSITIVE (A) NEG      AMPHETAMINES NEGATIVE  NEG      METHADONE NEGATIVE  NEG      HDSCOM (NOTE)    ETHYL ALCOHOL    Collection Time: 01/25/20  1:15 PM   Result Value Ref Range    ALCOHOL(ETHYL),SERUM <3 0 - 3 MG/DL   CBC WITH AUTOMATED DIFF    Collection Time: 01/25/20  1:15 PM   Result Value Ref Range    WBC 8.4 4.6 - 13.2 K/uL    RBC 5.29 4.70 - 5.50 M/uL    HGB 16.2 (H) 13.0 - 16.0 g/dL    HCT 47.6 36.0 - 48.0 %    MCV 90.0 74.0 - 97.0 FL    MCH 30.6 24.0 - 34.0 PG    MCHC 34.0 31.0 - 37.0 g/dL    RDW 13.4 11.6 - 14.5 %    PLATELET 004 207 - 340 K/uL    MPV 9.5 9.2 - 11.8 FL    NEUTROPHILS 62 40 - 73 %    LYMPHOCYTES 25 21 - 52 %    MONOCYTES 8 3 - 10 %    EOSINOPHILS 5 0 - 5 %    BASOPHILS 0 0 - 2 %    ABS. NEUTROPHILS 5.2 1.8 - 8.0 K/UL    ABS. LYMPHOCYTES 2.1 0.9 - 3.6 K/UL    ABS. MONOCYTES 0.7 0.05 - 1.2 K/UL    ABS. EOSINOPHILS 0.4 0.0 - 0.4 K/UL    ABS. BASOPHILS 0.0 0.0 - 0.1 K/UL    DF AUTOMATED     METABOLIC PANEL, BASIC    Collection Time: 01/25/20  1:15 PM   Result Value Ref Range    Sodium 142 136 - 145 mmol/L    Potassium 4.1 3.5 - 5.5 mmol/L    Chloride 109 100 - 111 mmol/L    CO2 32 21 - 32 mmol/L    Anion gap 1 (L) 3.0 - 18 mmol/L    Glucose 74 74 - 99 mg/dL    BUN 17 7.0 - 18 MG/DL    Creatinine 1.09 0.6 - 1.3 MG/DL    BUN/Creatinine ratio 16 12 - 20      GFR est AA >60 >60 ml/min/1.73m2    GFR est non-AA >60 >60 ml/min/1.73m2    Calcium 9.0 8.5 - 10.1 MG/DL       Radiologic Studies -   No orders to display         Medical Decision Making   I am the first provider for this patient.     I reviewed the vital signs, available nursing notes, past medical history, past surgical history, family history and social history. Vital Signs-Reviewed the patient's vital signs. Records Reviewed: Nursing Notes and Old Medical Records (Time of Review: 11:58 AM)    Provider Notes (Medical Decision Making): DDX: Patient with suicidal thoughts, and cocaine abuse, depression    Patient asking for help    We will check labs and consult with crisis  MDM    Medications - No data to display        ED Course: Progress Notes, Reevaluation, and Consults:  Labs back and patient cleared to be seen by crisis    Positive for cocaine    2 PM   paged crisis    2:40 PM  Ena Irizarry, from crisis has evaluated patient. She states that she is trying to get him bed placement here at [facility] behavioral.    Reassessed patient and is in no distress. Is cooperative. We will give him a food tray. Pt admitted here          Diagnosis     Clinical Impression:   1. Suicidal thoughts    2. Cocaine abuse ([address]    3. Other depression        Disposition: admitted    Follow-up Information    None          Patient's Medications   Start Taking    No medications on file   Continue Taking    DIVALPROEX DR (DEPAKOTE) 500 MG TABLET    Take 1 Tab by mouth two (2) times a day. Indications: Bipolar disorder. FAMOTIDINE (PEPCID) 20 MG TABLET    Take 1 Tab by mouth two (2) times a day. Indications: heartburn   These Medications have changed    No medications on file   Stop Taking    No medications on file         DO Nino Honeycutt medical dictation software was used for portions of this report. Unintended transcription errors may occur. My signature above authenticates this document and my orders, the final    diagnosis (es), discharge prescription (s), and instructions in the Epic    record.

## 2020-01-25 NOTE — BSMART NOTE
38 yo M seen in ED room 21 at the request of . Alert & O x 4, memory intact, known to writer from 4 previous admissions in 2019. Judgement intact, insight poor. Cooperative and polite, appears sad, reports extremely depressed mood with frequent thoughts of \"being no good and thoughts of just not living any more. \" States he has been pushed away by all his family, stresses later that he guesses, \"It's my own fault. \"  
 
CC: increased depression, suicidal ideation, hopelessness, out of control crack cocaine use States he, \"can't cope any more. \" Off his bipolar medications and can't stop using drugs. Feels like he is spiraling out of control. No specific suicidal plan. Denies homicidal ideation. Denies A / V hallucinations. Last crack use was yesterday, large quantity. Denies other drugs except cannabis. Minimal alcohol use. Relapsed 2-3 weeks after last [facility] admission in November 2019. Hx of noncompliance with medications, outpatient follow-up, appointments & 12-step meetings. Hx of incarcerations, denies current legal problems. Unable or unwilling to contract for safety from self harm if he returns to his life on the streets at this time. DISPOSITION: Discussed with .  contacted and admission orders received. Report to Alessio Johns RN on ADCD.

## 2020-01-25 NOTE — ED NOTES
Chart reviewed assumed care of pt at this time    1340:  1st contact with this pt:  Pt noted to be in disposable scrubs and belongings in a bag. Pt is awake alert and oriented x 4, pt reports + SI with no plan, denies HI. Pt reports he just wants help with his drug addiction. Pt reports they have taken his blood and urine. Pt asking for something to eat. Pt educated on ER flow. 1346:  Meal tray given. Chart nurse notified of pt and that a sitter is needed. 1435:  Crisis with pt at this time    1523:  Pt noted to be resting with eyes closed, resp even and unlabored  Nad. Sitter noted with pt    1710: This writer attempted to call report and no answer    [phone]:  Report to [facility] on Behavior health.   This writer will ask for security to be called

## 2020-01-26 LAB
CHOLEST SERPL-MCNC: 164 MG/DL
HBA1C MFR BLD: 5.1 % (ref 4.2–5.6)
HDLC SERPL-MCNC: 87 MG/DL (ref 40–60)
HDLC SERPL: 1.9 {RATIO} (ref 0–5)
LDLC SERPL CALC-MCNC: 70 MG/DL (ref 0–100)
LIPID PROFILE,FLP: ABNORMAL
TRIGL SERPL-MCNC: 35 MG/DL (ref ?–150)
TSH SERPL DL<=0.05 MIU/L-ACNC: 1.05 UIU/ML (ref 0.36–3.74)
VLDLC SERPL CALC-MCNC: 7 MG/DL

## 2020-01-26 PROCEDURE — 83036 HEMOGLOBIN GLYCOSYLATED A1C: CPT

## 2020-01-26 PROCEDURE — 84443 ASSAY THYROID STIM HORMONE: CPT

## 2020-01-26 PROCEDURE — 65220000005 HC RM SEMIPRIVATE PSYCH 3 OR 4 BED

## 2020-01-26 PROCEDURE — 36415 COLL VENOUS BLD VENIPUNCTURE: CPT

## 2020-01-26 PROCEDURE — 74011250637 HC RX REV CODE- 250/637: Performed by: PSYCHIATRY & NEUROLOGY

## 2020-01-26 PROCEDURE — 80061 LIPID PANEL: CPT

## 2020-01-26 RX ORDER — ACETAMINOPHEN 500 MG
2 TABLET ORAL
Status: DISCONTINUED | OUTPATIENT
Start: 2020-01-26 | End: 2020-01-27 | Stop reason: HOSPADM

## 2020-01-26 RX ORDER — LORATADINE 10 MG/1
10 TABLET ORAL DAILY
Status: DISCONTINUED | OUTPATIENT
Start: 2020-01-27 | End: 2020-01-27 | Stop reason: HOSPADM

## 2020-01-26 RX ADMIN — TRAZODONE HYDROCHLORIDE 50 MG: 50 TABLET ORAL at 20:33

## 2020-01-26 RX ADMIN — DIVALPROEX SODIUM 500 MG: 250 TABLET, DELAYED RELEASE ORAL at 09:10

## 2020-01-26 RX ADMIN — FAMOTIDINE 20 MG: 20 TABLET, FILM COATED ORAL at 09:10

## 2020-01-26 RX ADMIN — CARIPRAZINE 1.5 MG: 1.5 CAPSULE, GELATIN COATED ORAL at 20:33

## 2020-01-26 RX ADMIN — DIVALPROEX SODIUM 500 MG: 250 TABLET, DELAYED RELEASE ORAL at 20:33

## 2020-01-26 RX ADMIN — FAMOTIDINE 20 MG: 20 TABLET, FILM COATED ORAL at 20:33

## 2020-01-26 RX ADMIN — HYDROXYZINE PAMOATE 50 MG: 50 CAPSULE ORAL at 00:23

## 2020-01-26 NOTE — BH NOTES
MHT NOTE: The pt has been in and out of his room for the majority of the shift . He has been observed conversing with surrounding pts and staff. The pt has exhibited a positive and appropriate attitude. The pt attended dinner, snack and group. He has complied with performing his ADL's, abiding by the unit rules and utilizing the non-skid footwear that was provided for his safety. He did not experience any falls. The pt has not voiced current thoughts of SI or HI. He will continue to be monitored for behavior, location and safety for the remaining duration of the shift.

## 2020-01-26 NOTE — GROUP NOTE
IP  GROUP DOCUMENTATION INDIVIDUAL Group Therapy Note Date: 1/25/2020 Group Start Time: 2000 Group End Time: 2015 Group Topic: Nursing [facility] ADULT CHEM DEP Jacob Leon, RN 
 
JUANIS  GROUP DOCUMENTATION GROUP Group Therapy Note Attendees: 8 Attendance: Did not attend. Shreya Shape.  Paloma Loges

## 2020-01-26 NOTE — H&P
History and Physical    Patient: Laura Muhammad MRN: [id number]  SSN: [id number]    YOB: 1980  Age: 37 y.o. Sex: male      Subjective: Laura Muhammad is a 37 y.o. AA male who was admitted experiencing suicidal ideation with no plan. Also uses cocaine and wants help to stop. Past Medical History:   Diagnosis Date    Acid reflux     Asthma     [facility]    Cocaine use disorder, severe, dependence ([facility])      No past surgical history on file. No family history on file. Social History     Tobacco Use    Smoking status: Current Every Day Smoker     Packs/day: 1.00    Smokeless tobacco: Never Used   Substance Use Topics    Alcohol use: Not Currently      Prior to Admission medications    Medication Sig Start Date End Date Taking? Authorizing Provider   divalproex DR (DEPAKOTE) 500 mg tablet Take 1 Tab by mouth two (2) times a day. Indications: Bipolar disorder. 11/12/19   Arnaldo Posada MD   famotidine (PEPCID) 20 mg tablet Take 1 Tab by mouth two (2) times a day. Indications: heartburn 8/22/19   Susu Cortes MD        No Known Allergies    Review of Systems:  A comprehensive review of systems was negative except for that written in the History of Present Illness. Objective:     Vitals:    01/25/20 1044 01/25/20 1709 01/25/20 1804 01/26/20 0810   BP: 128/81 122/85 125/77 (!) 133/96   Pulse: 72 80 68 66   Resp: 18 18 18 18   Temp: 97.3 °F (36.3 °C) 97.6 °F (36.4 °C) 97.4 °F (36.3 °C) 98 °F (36.7 °C)   SpO2: 99% 99% (!) 68%    Weight: 85.7 kg (189 lb)      Height: 6' 2\" (1.88 m)           Physical Exam:  General:  Alert, cooperative, no distress, appears stated age. Eyes:  Conjunctivae/corneas clear. PERRL, EOMs intact. Fundi benign   Ears:  Normal TMs and external ear canals both ears. Nose: Nares normal. Septum midline. Mucosa normal. No drainage or sinus tenderness.    Mouth/Throat: Lips, mucosa, and tongue normal. Teeth and gums normal.   Neck: Supple, symmetrical, trachea midline, no adenopathy, thyroid: no enlargment/tenderness/nodules, no carotid bruit and no JVD. Back:   Symmetric, no curvature. ROM normal. No CVA tenderness. Lungs:   Clear to auscultation bilaterally. Heart:  Regular rate and rhythm, S1, S2 normal, no murmur, click, rub or gallop. Abdomen:   Soft, non-tender. Bowel sounds normal. No masses,  No organomegaly. Extremities: Extremities normal, atraumatic, no cyanosis or edema. Pulses: 2+ and symmetric all extremities. Skin: Skin color, texture, turgor normal. No rashes or lesions   Lymph nodes: Cervical, supraclavicular, and axillary nodes normal.   Neurologic: CNII-XII intact. Normal strength, sensation and reflexes throughout. Assessment:     Hospital Problems  Date Reviewed: 10/8/2019          Codes Class Noted POA    Bipolar 1 disorder, depressed, severe ([facility] ICD-10-CM: F31.4  ICD-9-CM: 296.53  1/25/2020 Unknown              Plan:     Patient will attend all groups while inpatient and take medications as prescribed. He will follow all out patient physician's orders after discharge.     Signed By: Sang Hermosillo NP     January 26, 2020

## 2020-01-26 NOTE — PROGRESS NOTES
Problem: Falls - Risk of  Goal: *Absence of Falls  Description  Patient will remain free of falls or injury during hospital stay   Outcome: Progressing Towards Goal  Note:   Fall Risk Interventions:  Medication Interventions: Teach patient to arise slowly  Pt remains free of falls. Problem: Depressed Mood (Adult/Pediatric)  Goal: *STG: Remains safe in hospital  Description  Patient will remain safe during hospital stay. Outcome: Progressing Towards Goal  Note:   Pt remains safe. Goal: *STG: Complies with medication therapy  Description  Patient will be compliant with medication therapy during hospital stay. Outcome: Progressing Towards Goal  Note:   Pt takes medications as ordered. Patient has been in the milieu on and off this shift. Patient has had some somatic complains and otherwise has been fine. Patient states that he needs help with getting off of cocaine and admits to SI without a plan but is able to contract for safety on the unit. Patient denies HI and AVH.

## 2020-01-26 NOTE — PROGRESS NOTES
Problem: Falls - Risk of  Goal: *Absence of Falls  Description  Patient will remain free of falls or injury during hospital stay   Outcome: Progressing Towards Goal  Note: Fall Risk Interventions:  Medication Interventions: (P) Teach patient to arise slowly      Problem: Suicide  Goal: *STG/LTG: Complies with medication therapy  Outcome: Not Progressing Towards Goal     Problem: Suicide  Goal: *STG/LTG: No longer expresses self destructive or suicidal thoughts  Description  Patient will no longer express self destructive or suicidal thoughts during hospital stay. Outcome: Not Progressing Towards Goal      Received patient to unit accompanied by ER staff and security. Patient was alert, oriented x 4. Patient appears sad states its because of  \"what I did to my marriage. \"Patient denies SI/HI/AVH and pain. Patient states that he \"wants help to stop abusing cocaine. \" Patient states that he has not been taking any of his prescribed medication and doesn't have a primary care provider. States he and his wife live with his sister and that he works in [place]. Patients that he would like to go into [facility] program after discharge. \" Will continue to monitor for safety. RN will initiate, develop, implement, review or revise treatment plan.

## 2020-01-26 NOTE — H&P
History and Physical    Patient: Opal Israel MRN: [id number]  SSN: [id number]    YOB: 1980  Age: 37 y.o. Sex: male      Subjective: Opal Israel is a 37 y.o. AA male who was admitted voicing depression and suicidal ideation. Also stating that he is a cocaine user and wants help to stop. Past Medical History:   Diagnosis Date    Acid reflux     Asthma     [facility]    Cocaine use disorder, severe, dependence ([facility])      No past surgical history on file. No family history on file. Social History     Tobacco Use    Smoking status: Current Every Day Smoker     Packs/day: 1.00    Smokeless tobacco: Never Used   Substance Use Topics    Alcohol use: Not Currently      Prior to Admission medications    Medication Sig Start Date End Date Taking? Authorizing Provider   divalproex DR (DEPAKOTE) 500 mg tablet Take 1 Tab by mouth two (2) times a day. Indications: Bipolar disorder. 11/12/19   Isac Posada MD   famotidine (PEPCID) 20 mg tablet Take 1 Tab by mouth two (2) times a day. Indications: heartburn 8/22/19   Olive Champagne MD        No Known Allergies    Review of Systems:  A comprehensive review of systems was negative except for that written in the History of Present Illness. Objective:     Vitals:    01/25/20 1044 01/25/20 1709 01/25/20 1804 01/26/20 0810   BP: 128/81 122/85 125/77 (!) 133/96   Pulse: 72 80 68 66   Resp: 18 18 18 18   Temp: 97.3 °F (36.3 °C) 97.6 °F (36.4 °C) 97.4 °F (36.3 °C) 98 °F (36.7 °C)   SpO2: 99% 99% (!) 68%    Weight: 85.7 kg (189 lb)      Height: 6' 2\" (1.88 m)           Physical Exam:  General:  Alert, cooperative, no distress, appears stated age. Eyes:  Conjunctivae/corneas clear. PERRL, EOMs intact. Fundi benign   Ears:  Normal TMs and external ear canals both ears. Nose: Nares normal. Septum midline. Mucosa normal. No drainage or sinus tenderness.    Mouth/Throat: Lips, mucosa, and tongue normal. Teeth and gums normal.   Neck: Supple, symmetrical, trachea midline, no adenopathy, thyroid: no enlargment/tenderness/nodules, no carotid bruit and no JVD. Back:   Symmetric, no curvature. ROM normal. No CVA tenderness. Lungs:   Clear to auscultation bilaterally. Heart:  Regular rate and rhythm, S1, S2 normal, no murmur, click, rub or gallop. Abdomen:   Soft, non-tender. Bowel sounds normal. No masses,  No organomegaly. Extremities: Extremities normal, atraumatic, no cyanosis or edema. Complained of bilateral knee pain. Has Ibuprofen ordered. Pulses: 2+ and symmetric all extremities. Skin: Skin color, texture, turgor normal. No rashes or lesions   Lymph nodes: Cervical, supraclavicular, and axillary nodes normal.   Neurologic: CNII-XII intact. Normal strength, sensation and reflexes throughout. Assessment:     Hospital Problems  Date Reviewed: 10/8/2019          Codes Class Noted POA    Bipolar 1 disorder, depressed, severe ([facility] ICD-10-CM: F31.4  ICD-9-CM: 296.53  1/25/2020 Unknown              Plan:     Patient to attend all groups and take medications as ordered. He is to follow all physician in and out patient orders.     Signed By: Jose Chappell NP     January 26, 2020

## 2020-01-26 NOTE — GROUP NOTE
JUANIS  GROUP DOCUMENTATION INDIVIDUAL Group Therapy Note Date: 1/26/2020 Group Start Time: 1300 Group End Time: 6049 Group Topic: Nursing [facility] 1 ADULT CHEM Cari Mccall RN 
 
JUANIS  GROUP DOCUMENTATION GROUP Group Therapy Note Attendees:  6 Attendance: Did not attend Yanique Velazquez RN

## 2020-01-26 NOTE — BH NOTES
0023 patient requested and received vistaril 50 mg PO for anxiety related to not sleeping well. Medication effective.

## 2020-01-27 VITALS
OXYGEN SATURATION: 68 % | RESPIRATION RATE: 18 BRPM | WEIGHT: 189 LBS | SYSTOLIC BLOOD PRESSURE: 135 MMHG | DIASTOLIC BLOOD PRESSURE: 81 MMHG | BODY MASS INDEX: 24.26 KG/M2 | TEMPERATURE: 97.1 F | HEIGHT: 74 IN | HEART RATE: 104 BPM

## 2020-01-27 PROCEDURE — 74011250637 HC RX REV CODE- 250/637: Performed by: PSYCHIATRY & NEUROLOGY

## 2020-01-27 RX ORDER — DIVALPROEX SODIUM 500 MG/1
500 TABLET, DELAYED RELEASE ORAL 2 TIMES DAILY
Qty: 30 TAB | Refills: 1 | Status: SHIPPED | OUTPATIENT
Start: 2020-01-27 | End: 2020-04-02

## 2020-01-27 RX ADMIN — NICOTINE POLACRILEX 2 MG: 2 GUM, CHEWING ORAL at 11:19

## 2020-01-27 RX ADMIN — DIVALPROEX SODIUM 500 MG: 250 TABLET, DELAYED RELEASE ORAL at 08:05

## 2020-01-27 RX ADMIN — FAMOTIDINE 20 MG: 20 TABLET, FILM COATED ORAL at 08:05

## 2020-01-27 NOTE — BH NOTES
Pt appeared to have slept for 6 hours thus far; c/o of stomach pain. RN was informed. Will continue to monitor for safety.
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
## 2020-01-27 NOTE — DISCHARGE INSTRUCTIONS
Patient armband removed and shredded      BEHAVIORAL HEALTH NURSING DISCHARGE NOTE      The following personal items collected during your admission are returned to you:   Dental Appliance:    Vision: Visual Aid: None  Hearing Aid:    Jewelry: Jewelry: Watch(nurses station locker)  Clothing: Clothing: Footwear, Hat, Jacket/Coat, Pants(tshirt & undershirt w/patient)  Other Valuables:    Valuables sent to safe:        PATIENT INSTRUCTIONS:    Important numbers given to patient  The discharge information has been reviewed with the patient. The patient verbalized understanding.

## 2020-01-27 NOTE — GROUP NOTE
JUANIS DOWD GROUP DOCUMENTATION INDIVIDUAL Group Therapy Note Date: 1/26/2020 Group Start Time: 2000 Group End Time: 2030 Group Topic: Nursing [facility] ADULT CHEM DEP Drew OLIVAREZ  GROUP DOCUMENTATION GROUP Group Therapy Note Attendees:13 Attendance: Did not attend iVtaly Andres

## 2020-01-27 NOTE — BSMART NOTE
ART THERAPY GROUP PROGRESS NOTE Group time:9:30 The patient was awake and walking around unit and refused group when engaged by both intern and art therapist. 
This note/group was written/conducted by Art Therapy Intern Patricia Robles.

## 2020-01-27 NOTE — BSMART NOTE
Summary: The patient is a 37year old male currently , admitted to this facility on a voluntary basis. Patient has a history of having increased difficulties with becoming depressed, some of this being associated to his daily cocaine use disorder, describing that his depression had gotten to the point in which he was suicidal. 
 
Assessment/Intervention:  met with patient to build rapport and for introduction as patient's . Patient was cooperative and social. Patient presented as cheerful.  prompted patient to discuss why he was in treatment. Patient informed  that he \"needed a day to clear\" his head. Patient reported he is \"good now\" and feels he is back on track as \"the drugs\" and other life stressors were becoming overwhelming.  reviewed the importance of positive lifestyle changes when trying to be substance free with patient. Additionally,  encouraged patient to attend psycho-educational groups while in treatment. Patient was able to contract for his safety as he denied any current suicidal/homicidal ideations and denied current auditory/visual hallucinations Plan:  will continue to monitor patient progress as needed.  to also continue to work with patient on positive lifestyle changes.  
  
Shashi Romero M.Ed., [facility]

## 2020-01-27 NOTE — PROGRESS NOTES
NUTRITION    Nutrition Consult: Other    RECOMMENDATIONS / PLAN:     - Add supplements: Ensure Enlive, once daily  - Monitor and encourage po supplement intake. - Continue RD inpatient monitoring and evaluation. NUTRITION DIAGNOSIS & INTERVENTIONS:     - Meals/snacks: general healthy diet  - Medical food supplement therapy: initiate    Nutrition Diagnosis: Predicted inadequate energy intake related to appetite and hx of cocaine use as evidenced by pt with poor meal intake PTA      ASSESSMENT:     Admitted voicing depression and SI, also stating he is a cocaine user and wants held to stop. Pt reports poor meal intake/appetite PTA with wt loss and cocaine use. Good intake since admission asking for Ensure supplements, agreeable to once daily. Nutritional intake adequate to meet patients estimated nutritional needs:  Unable to determine at this time    Diet: DIET REGULAR    Food Allergies: NKFA  Current Appetite: Good  Appetite/meal intake prior to admission: Poor; reports substance abuse resulting in poor meal intake  Feeding Limitations:  [] Swallowing Difficulty       [] Chewing Difficulty       [] Other   Current Meal Intake: No data found. Gastrointestinal Issues:  [] Yes    [x] No: none known   Skin Integrity:  WDL    Pertinent Medications:  Reviewed: famotidine    Labs:  Reviewed     Anthropometrics:  Ht Readings from Last 1 Encounters:   01/25/20 6' 2\" (1.88 m)       Last 3 Recorded Weights in this Encounter    01/25/20 1044   Weight: 85.7 kg (189 lb)       Body mass index is 24.27 kg/m². Weight History: Pt reports a previous wt of 225-230#.  Per chart review pt with wt gain x 2 months PTA  Weight Metrics 1/25/2020 11/8/2019 10/8/2019 9/17/2019 8/18/2019 3/1/2019 12/5/2018   Weight 189 lb 166 lb 180 lb 199 lb 199 lb 220 lb 230 lb   BMI 24.27 kg/m2 21.31 kg/m2 23.11 kg/m2 25.55 kg/m2 25.55 kg/m2 28.25 kg/m2 29.53 kg/m2       Admitting Diagnosis: Bipolar 1 disorder, depressed, severe ([address] [F31.4]  Past Medical History:   Diagnosis Date    Acid reflux     Asthma     Bipolar disorder, unspecified ([facility] 3/1/2019    Cocaine use disorder, severe, dependence ([facility]         Education Needs:        [x] None identified  [] Identified - Not appropriate at this time  []  Identified and addressed - refer to education log  Learning Limitations:   [x] None identified  [] Identified    Cultural, Buddhist & ethnic food preferences identified:  [x] None    [] Yes      ESTIMATED NUTRITION NEEDS:     [id number] kcal (MSJx1.3-1.5),  gm protein (1-1.2 gm/kg), 1 mL/kcal  Based on: 86 kg       [x] Actual BW      [] IBW          MONITORING & EVALUATION:     Nutrition Goal(s):   - PO nutrition intake will meet >75% of patient estimated nutritional needs within the next 7 days. Outcome: New/Initial goal     Monitor: [x] Food and nutrient intake   [x] Food and nutrient administration  [x] Comparative standards   [x] Nutrition-focused physical findings   [x] Anthropometric Measurements   [x] Treatment/therapy   [x] Biochemical data, medical tests, and procedures         Previous Recommendations (for follow-up assessments only):    Not Applicable      Discharge Planning: No nutritional discharge needs at this time.     [x]  Participated in care planning, discharge planning, & interdisciplinary rounds as appropriate      Meeta Lynn RD   Pager: [phone]

## 2020-01-27 NOTE — GROUP NOTE
[facility] GROUP DOCUMENTATION INDIVIDUAL Group Therapy Note Date: 1/27/2020 Group Start Time: 1100 Group End Time: 6218 Group Topic: Nursing [facility] ADULT CHEM DEP Othella Rinne, RN 
 
[facility] GROUP DOCUMENTATION GROUP Group Therapy Note Attendees: 6 Attendance: Attended Interventions/techniques: Informed and Supported Follows Directions: Followed directions Interactions: Interacted appropriately Mental Status: Calm Behavior/appearance: Cooperative Goals Achieved: Identified medication adherence strategies and Identified resources and support systems Molina Alvarez RN

## 2020-01-28 NOTE — DISCHARGE SUMMARY
[facility]    Name:  Sara Carr  MR#:   [id number]  :  1980  ACCOUNT #:  [de-identified]  ADMIT DATE:  2020  DISCHARGE DATE:  2020    SIGNIFICANT FINDINGS:  History and physical exam was performed shortly after the patient was admitted to the facility, attention invited to this document, a place where the patient's reason for which he consulted with DR[facility], the result of his being examined there and so the basis for which he required inpatient psychiatric admission are very clearly stated. For the purpose of this discharge summary, the reader is advised that the patient indeed came to the [facility] complaining of being increasingly depressed and also suicidal.  The patient, who has a chronic history of bipolar disorder, has also history of cocaine use disorder which is rather severe and chronic. Questions were raised at the time of the patient's evaluation if he also perhaps was depressed because of his use of cocaine (cocaine-induced mood disorder); however, due to the severity of his symptoms and the fact that he was describing himself as being suicidal and not able to maintain self-control, the case was discussed with the physician on-call who kindly admitted the patient to my service and so the basis for this admission. PSYCHIATRIC HISTORY:  Remarkable for the patient being hospitalized here at least four times in 2019. His last hospitalization was under the care of Dr. Sidra Grey in 2019 when he was again diagnosed with a bipolar I disorder, most recent episode depressed, severe, without psychotic features. During that admission, the patient also described the use of cocaine with concerns being raised about the patient's history of lack of treatment compliance regarding not only outpatient care but also his not participating in the 12-step outpatient treatment program as he was also suggested.   It appears by chart review that the patient was lost to outpatient care, and even though he had been prescribed with a combination of medications, he never took them. Multiple labs were performed during the patient's admission to the emergency room including a CBC with differential that showed mild elevation of hemoglobin to 16.2, otherwise completely normal test results. A basic metabolic panel showed normal electrolytes, normal blood sugar of 74, normal kidney functioning tests. Alcohol levels below 3. Urine drug screen was positive for cocaine and cannabis. Since admission, due to the possibility of being prescribed with an atypical for the treatment of his bipolar illness, the patient had a lipid panel that showed triglycerides of 35, total cholesterol 164, HDL excellent at 87, cholesterol-HDL ratio 1.9 and LDL of 70. A1c normal at 5.1% also noted. The patient also had a TSH for completeness purposes, that showed normal results at 1.05 international units/mL. COURSE IN HOSPITAL AND TREATMENT:  Admitted to the adult program, the patient has been seen daily and has been referred to the groups within context of the program.  When the undersigned approached the patient today, he indicated that he feels much better and he is currently requesting to be discharged to outpatient treatment. The patient is currently denying any thoughts of harming self, denies any psychotic symptoms and is willing to be followed as an outpatient. Based upon the fact that he is not detainable, we will proceed to discharge. However, the patient's overall prognosis is considered to be guarded based upon his history of lack of outpatient treatment compliance. CONDITION ON DISCHARGE:  Psychiatrically competent, the patient is found to be also not suicidal nor homicidal, not psychotic and showing no evidence of neurocognitive impairment.     FINAL DIAGNOSES:  AXIS I:  Bipolar I disorder, most recent episode depressed, without psychotic features, severe. Cocaine use disorder, severe. Rule out cocaine-induced mood disorder. AXIS II:  Deferred. AXIS III:  Reflux esophagitis by history. History of asthma. History of trauma, left foot, remote. DISPOSITION:  The patient will be discharged to outpatient treatment with a local [facility] services board. He is to consult with a PCP of his choice for medical care. PRESCRIPTIONS UPON DISCHARGE:  Prescriptions for 2 weeks with one refill will be given for Vraylar 1.5 mg tablets, to take one every night, and Depakote  mg tablets, #30, one refill, to take one twice a day. No evidence of medication-related side effects noted. PROGNOSIS:  It will entirely depend upon the patient's treatment compliance and treatment response.       Mathew Victoria MD      [id number]  D:  01/27/2020 13:09  T:  01/27/2020 18:54  JOB #:  [id number]

## 2020-03-28 ENCOUNTER — HOSPITAL ENCOUNTER (INPATIENT)
Age: 40
LOS: 5 days | Discharge: HOME | DRG: 754 | End: 2020-04-02
Attending: EMERGENCY MEDICINE | Admitting: PSYCHIATRY & NEUROLOGY
Payer: MEDICAID

## 2020-03-28 DIAGNOSIS — N39.0 ACUTE UTI: ICD-10-CM

## 2020-03-28 DIAGNOSIS — R45.851 SUICIDAL IDEATION: ICD-10-CM

## 2020-03-28 DIAGNOSIS — F14.10 COCAINE ABUSE (HCC): Primary | ICD-10-CM

## 2020-03-28 PROBLEM — F31.9 BIPOLAR 1 DISORDER (HCC): Status: ACTIVE | Noted: 2020-03-28

## 2020-03-28 LAB
AMPHET UR QL SCN: NEGATIVE
ANION GAP SERPL CALC-SCNC: 2 MMOL/L (ref 3–18)
APPEARANCE UR: CLEAR
BACTERIA URNS QL MICRO: ABNORMAL /HPF
BARBITURATES UR QL SCN: NEGATIVE
BASOPHILS # BLD: 0 K/UL (ref 0–0.1)
BASOPHILS NFR BLD: 0 % (ref 0–2)
BENZODIAZ UR QL: NEGATIVE
BILIRUB UR QL: NEGATIVE
BUN SERPL-MCNC: 10 MG/DL (ref 7–18)
BUN/CREAT SERPL: 8 (ref 12–20)
CALCIUM SERPL-MCNC: 8.8 MG/DL (ref 8.5–10.1)
CANNABINOIDS UR QL SCN: POSITIVE
CHLORIDE SERPL-SCNC: 107 MMOL/L (ref 100–111)
CO2 SERPL-SCNC: 34 MMOL/L (ref 21–32)
COCAINE UR QL SCN: POSITIVE
COLOR UR: ABNORMAL
CREAT SERPL-MCNC: 1.27 MG/DL (ref 0.6–1.3)
DIFFERENTIAL METHOD BLD: ABNORMAL
EOSINOPHIL # BLD: 0.2 K/UL (ref 0–0.4)
EOSINOPHIL NFR BLD: 2 % (ref 0–5)
EPITH CASTS URNS QL MICRO: ABNORMAL /LPF (ref 0–5)
ERYTHROCYTE [DISTWIDTH] IN BLOOD BY AUTOMATED COUNT: 13.6 % (ref 11.6–14.5)
ETHANOL SERPL-MCNC: <3 MG/DL (ref 0–3)
GLUCOSE SERPL-MCNC: 61 MG/DL (ref 74–99)
GLUCOSE UR STRIP.AUTO-MCNC: NEGATIVE MG/DL
HCT VFR BLD AUTO: 49.1 % (ref 36–48)
HDSCOM,HDSCOM: ABNORMAL
HGB BLD-MCNC: 16.4 G/DL (ref 13–16)
HGB UR QL STRIP: NEGATIVE
KETONES UR QL STRIP.AUTO: ABNORMAL MG/DL
LEUKOCYTE ESTERASE UR QL STRIP.AUTO: ABNORMAL
LYMPHOCYTES # BLD: 1.4 K/UL (ref 0.9–3.6)
LYMPHOCYTES NFR BLD: 11 % (ref 21–52)
MCH RBC QN AUTO: 30.2 PG (ref 24–34)
MCHC RBC AUTO-ENTMCNC: 33.4 G/DL (ref 31–37)
MCV RBC AUTO: 90.4 FL (ref 74–97)
METHADONE UR QL: NEGATIVE
MONOCYTES # BLD: 0.8 K/UL (ref 0.05–1.2)
MONOCYTES NFR BLD: 6 % (ref 3–10)
NEUTS SEG # BLD: 9.8 K/UL (ref 1.8–8)
NEUTS SEG NFR BLD: 81 % (ref 40–73)
NITRITE UR QL STRIP.AUTO: NEGATIVE
OPIATES UR QL: NEGATIVE
PCP UR QL: NEGATIVE
PH UR STRIP: 7 [PH] (ref 5–8)
PLATELET # BLD AUTO: 233 K/UL (ref 135–420)
PMV BLD AUTO: 10 FL (ref 9.2–11.8)
POTASSIUM SERPL-SCNC: 3.9 MMOL/L (ref 3.5–5.5)
PROT UR STRIP-MCNC: NEGATIVE MG/DL
RBC # BLD AUTO: 5.43 M/UL (ref 4.7–5.5)
RBC #/AREA URNS HPF: ABNORMAL /HPF (ref 0–5)
SODIUM SERPL-SCNC: 143 MMOL/L (ref 136–145)
SP GR UR REFRACTOMETRY: 1.02 (ref 1–1.03)
UROBILINOGEN UR QL STRIP.AUTO: 4 EU/DL (ref 0.2–1)
WBC # BLD AUTO: 12.2 K/UL (ref 4.6–13.2)
WBC URNS QL MICRO: ABNORMAL /HPF (ref 0–4)

## 2020-03-28 PROCEDURE — 87661 TRICHOMONAS VAGINALIS AMPLIF: CPT

## 2020-03-28 PROCEDURE — 99283 EMERGENCY DEPT VISIT LOW MDM: CPT

## 2020-03-28 PROCEDURE — 81001 URINALYSIS AUTO W/SCOPE: CPT

## 2020-03-28 PROCEDURE — 87491 CHLMYD TRACH DNA AMP PROBE: CPT

## 2020-03-28 PROCEDURE — 65220000001 HC RM PRIVATE PSYCH

## 2020-03-28 PROCEDURE — 80048 BASIC METABOLIC PNL TOTAL CA: CPT

## 2020-03-28 PROCEDURE — 80307 DRUG TEST PRSMV CHEM ANLYZR: CPT

## 2020-03-28 PROCEDURE — 85025 COMPLETE CBC W/AUTO DIFF WBC: CPT

## 2020-03-28 RX ORDER — IBUPROFEN 200 MG
1 TABLET ORAL DAILY
Status: DISCONTINUED | OUTPATIENT
Start: 2020-03-28 | End: 2020-03-29 | Stop reason: DRUGHIGH

## 2020-03-28 NOTE — ED PROVIDER NOTES
EMERGENCY DEPARTMENT HISTORY AND PHYSICAL EXAM    Date: 3/28/2020  Patient Name: Karen Alvarez    History of Presenting Illness     Chief Complaint   Patient presents with    Drug Problem    Suicidal         History Provided By: Patient    Additional History (Context): Karen Alvarez is a 41 y.o. male with asthma and Bipolar, cocaine abuse who presents with feeling suicidal.  He is tired of using cocaine. He says today he was arrested and there is a warrant out for his arrest he was released on bond and instead of killing himself he came here. Last used cocaine this morning. He uses anytime he get a chance. He says it is ruining his family life. He says the recent legal issue was with shoplifting and originally the charge was for grand larceny. Still has 1 to 2 years left on probation for that. Denies HI. Welcomes nicotine patch. PCP: None    Current Facility-Administered Medications   Medication Dose Route Frequency Provider Last Rate Last Dose    nicotine (NICODERM CQ) 14 mg/24 hr patch 1 Patch  1 Patch TransDERmal DAILY Diana Garcia PA        cefTRIAXone (ROCEPHIN) 2 g in sterile water (preservative free) 20 mL IV syringe  2 g IntraVENous NOW Diana Garcia PA         Current Outpatient Medications   Medication Sig Dispense Refill    divalproex DR (DEPAKOTE) 500 mg tablet Take 1 Tab by mouth two (2) times a day. Indications: Bipolar I disorder, mre depressed. 30 Tab 1    cariprazine (VRAYLAR) 1.5 mg capsule Take 1 Cap by mouth nightly. Indications: bipolar depression 15 Cap 1       Past History     Past Medical History:  Past Medical History:   Diagnosis Date    Acid reflux     Asthma     Bipolar disorder, unspecified ([facility] 3/1/2019    Cocaine use disorder, severe, dependence ([facility]        Past Surgical History:  History reviewed. No pertinent surgical history. Family History:  History reviewed. No pertinent family history.     Social History:  Social History     Tobacco Use    Smoking status: Current Every Day Smoker     Packs/day: 1.00    Smokeless tobacco: Never Used   Substance Use Topics    Alcohol use: Not Currently    Drug use: Yes     Types: Cocaine       Allergies:  No Known Allergies      Review of Systems   Review of Systems   Constitutional: Negative. HENT: Negative. Eyes: Negative. Respiratory: Negative. Cardiovascular: Negative. Gastrointestinal: Negative. Endocrine: Negative. Genitourinary: Negative. Musculoskeletal: Negative. Skin: Negative. Allergic/Immunologic: Negative. Neurological: Negative. Hematological: Negative. Psychiatric/Behavioral: Positive for dysphoric mood and suicidal ideas. All Other Systems Negative  Physical Exam     Vitals:    03/28/20 1828   BP: 146/87   Pulse: 98   Resp: 12   Temp: 100.4 °F (38 °C)   SpO2: 97%   Weight: 86.2 kg (190 lb)   Height: 6' 2\" (1.88 m)     Physical Exam  Vitals signs and nursing note reviewed. Constitutional:       General: He is not in acute distress. Appearance: He is well-developed. He is not ill-appearing, toxic-appearing or diaphoretic. HENT:      Head: Normocephalic and atraumatic. Neck:      Musculoskeletal: Normal range of motion and neck supple. Thyroid: No thyromegaly. Vascular: No carotid bruit. Trachea: No tracheal deviation. Cardiovascular:      Rate and Rhythm: Normal rate and regular rhythm. Heart sounds: Normal heart sounds. No murmur. No friction rub. No gallop. Pulmonary:      Effort: Pulmonary effort is normal. No respiratory distress. Breath sounds: Normal breath sounds. No stridor. No wheezing or rales. Chest:      Chest wall: No tenderness. Abdominal:      General: There is no distension. Palpations: Abdomen is soft. There is no mass. Tenderness: There is no abdominal tenderness. There is no guarding or rebound. Musculoskeletal: Normal range of motion. Skin:     General: Skin is warm and dry.       Coloration: Skin is not pale. Neurological:      Mental Status: He is alert. Psychiatric:         Speech: Speech normal.         Behavior: Behavior normal.         Thought Content: Thought content normal.         Judgment: Judgment normal.      Comments: Tearful          Diagnostic Study Results     Labs -     Recent Results (from the past 12 hour(s))   DRUG SCREEN, URINE    Collection Time: 03/28/20  6:40 PM   Result Value Ref Range    BENZODIAZEPINES NEGATIVE  NEG      BARBITURATES NEGATIVE  NEG      THC (TH-CANNABINOL) POSITIVE (A) NEG      OPIATES NEGATIVE  NEG      PCP(PHENCYCLIDINE) NEGATIVE  NEG      COCAINE POSITIVE (A) NEG      AMPHETAMINES NEGATIVE  NEG      METHADONE NEGATIVE  NEG      HDSCOM (NOTE)    URINALYSIS W/ RFLX MICROSCOPIC    Collection Time: 03/28/20  6:40 PM   Result Value Ref Range    Color DARK YELLOW      Appearance CLEAR      Specific gravity 1.025 1.005 - 1.030      pH (UA) 7.0 5.0 - 8.0      Protein NEGATIVE  NEG mg/dL    Glucose NEGATIVE  NEG mg/dL    Ketone TRACE (A) NEG mg/dL    Bilirubin NEGATIVE  NEG      Blood NEGATIVE  NEG      Urobilinogen 4.0 (H) 0.2 - 1.0 EU/dL    Nitrites NEGATIVE  NEG      Leukocyte Esterase SMALL (A) NEG     URINE MICROSCOPIC ONLY    Collection Time: 03/28/20  6:40 PM   Result Value Ref Range    WBC 15 to 20 0 - 4 /hpf    RBC NONE 0 - 5 /hpf    Epithelial cells FEW 0 - 5 /lpf    Bacteria FEW (A) NEG /hpf   CBC WITH AUTOMATED DIFF    Collection Time: 03/28/20  7:00 PM   Result Value Ref Range    WBC 12.2 4.6 - 13.2 K/uL    RBC 5.43 4.70 - 5.50 M/uL    HGB 16.4 (H) 13.0 - 16.0 g/dL    HCT 49.1 (H) 36.0 - 48.0 %    MCV 90.4 74.0 - 97.0 FL    MCH 30.2 24.0 - 34.0 PG    MCHC 33.4 31.0 - 37.0 g/dL    RDW 13.6 11.6 - 14.5 %    PLATELET 479 995 - 782 K/uL    MPV 10.0 9.2 - 11.8 FL    NEUTROPHILS 81 (H) 40 - 73 %    LYMPHOCYTES 11 (L) 21 - 52 %    MONOCYTES 6 3 - 10 %    EOSINOPHILS 2 0 - 5 %    BASOPHILS 0 0 - 2 %    ABS. NEUTROPHILS 9.8 (H) 1.8 - 8.0 K/UL    ABS. LYMPHOCYTES 1.4 0.9 - 3.6 K/UL    ABS. MONOCYTES 0.8 0.05 - 1.2 K/UL    ABS. EOSINOPHILS 0.2 0.0 - 0.4 K/UL    ABS. BASOPHILS 0.0 0.0 - 0.1 K/UL    DF AUTOMATED     METABOLIC PANEL, BASIC    Collection Time: 03/28/20  7:00 PM   Result Value Ref Range    Sodium 143 136 - 145 mmol/L    Potassium 3.9 3.5 - 5.5 mmol/L    Chloride 107 100 - 111 mmol/L    CO2 34 (H) 21 - 32 mmol/L    Anion gap 2 (L) 3.0 - 18 mmol/L    Glucose 61 (L) 74 - 99 mg/dL    BUN 10 7.0 - 18 MG/DL    Creatinine 1.27 0.6 - 1.3 MG/DL    BUN/Creatinine ratio 8 (L) 12 - 20      GFR est AA >60 >60 ml/min/1.73m2    GFR est non-AA >60 >60 ml/min/1.73m2    Calcium 8.8 8.5 - 10.1 MG/DL   ETHYL ALCOHOL    Collection Time: 03/28/20  7:00 PM   Result Value Ref Range    ALCOHOL(ETHYL),SERUM <3 0 - 3 MG/DL       Radiologic Studies -   No orders to display     CT Results  (Last 48 hours)    None        CXR Results  (Last 48 hours)    None            Medical Decision Making   I am the first provider for this patient. I reviewed the vital signs, available nursing notes, past medical history, past surgical history, family history and social history. Vital Signs-Reviewed the patient's vital signs. Records Reviewed: Nursing Notes and Old Medical Records    Procedures:  Procedures    Provider Notes (Medical Decision Making):   Clear medically and present to crisis. 10:04 PM  She has been accepted by crisis. Have given Rocephin for his UTI. MED RECONCILIATION:  Current Facility-Administered Medications   Medication Dose Route Frequency    nicotine (NICODERM CQ) 14 mg/24 hr patch 1 Patch  1 Patch TransDERmal DAILY    cefTRIAXone (ROCEPHIN) 2 g in sterile water (preservative free) 20 mL IV syringe  2 g IntraVENous NOW     Current Outpatient Medications   Medication Sig    divalproex DR (DEPAKOTE) 500 mg tablet Take 1 Tab by mouth two (2) times a day. Indications: Bipolar I disorder, mre depressed.     cariprazine (VRAYLAR) 1.5 mg capsule Take 1 Cap by mouth nightly. Indications: bipolar depression       Disposition:  admit    Follow-up Information    None         Current Discharge Medication List              Diagnosis     Clinical Impression:   1. Cocaine abuse ([facility]    2. Suicidal ideation    3.  Acute UTI

## 2020-03-29 PROBLEM — F32.A DEPRESSIVE DISORDER: Status: ACTIVE | Noted: 2020-03-29

## 2020-03-29 PROBLEM — J02.0 STREP PHARYNGITIS: Status: ACTIVE | Noted: 2020-03-29

## 2020-03-29 PROBLEM — F31.9 BIPOLAR 1 DISORDER (HCC): Status: RESOLVED | Noted: 2020-03-28 | Resolved: 2020-03-29

## 2020-03-29 PROBLEM — F31.4 BIPOLAR I DISORDER, MOST RECENT EPISODE DEPRESSED, SEVERE WITHOUT PSYCHOTIC FEATURES (HCC): Status: RESOLVED | Noted: 2019-11-08 | Resolved: 2020-03-29

## 2020-03-29 PROBLEM — F31.4 BIPOLAR 1 DISORDER, DEPRESSED, SEVERE (HCC): Status: RESOLVED | Noted: 2020-01-25 | Resolved: 2020-03-29

## 2020-03-29 LAB
DEPRECATED S PYO AG THROAT QL EIA: POSITIVE
FLUAV AG NPH QL IA: NEGATIVE
FLUBV AG NOSE QL IA: NEGATIVE

## 2020-03-29 PROCEDURE — 87880 STREP A ASSAY W/OPTIC: CPT

## 2020-03-29 PROCEDURE — 65220000001 HC RM PRIVATE PSYCH

## 2020-03-29 PROCEDURE — 74011250637 HC RX REV CODE- 250/637: Performed by: PSYCHIATRY & NEUROLOGY

## 2020-03-29 PROCEDURE — 74011250636 HC RX REV CODE- 250/636: Performed by: EMERGENCY MEDICINE

## 2020-03-29 PROCEDURE — 87804 INFLUENZA ASSAY W/OPTIC: CPT

## 2020-03-29 PROCEDURE — 74011250637 HC RX REV CODE- 250/637: Performed by: EMERGENCY MEDICINE

## 2020-03-29 PROCEDURE — 74011000250 HC RX REV CODE- 250: Performed by: EMERGENCY MEDICINE

## 2020-03-29 RX ORDER — TRAZODONE HYDROCHLORIDE 50 MG/1
50 TABLET ORAL
Status: DISCONTINUED | OUTPATIENT
Start: 2020-03-29 | End: 2020-04-02 | Stop reason: HOSPADM

## 2020-03-29 RX ORDER — HALOPERIDOL 5 MG/ML
5 INJECTION INTRAMUSCULAR
Status: DISCONTINUED | OUTPATIENT
Start: 2020-03-29 | End: 2020-04-02 | Stop reason: HOSPADM

## 2020-03-29 RX ORDER — BENZTROPINE MESYLATE 1 MG/ML
1 INJECTION INTRAMUSCULAR; INTRAVENOUS
Status: DISCONTINUED | OUTPATIENT
Start: 2020-03-29 | End: 2020-04-02 | Stop reason: HOSPADM

## 2020-03-29 RX ORDER — ACETAMINOPHEN 500 MG
1000 TABLET ORAL
Status: COMPLETED | OUTPATIENT
Start: 2020-03-29 | End: 2020-03-29

## 2020-03-29 RX ORDER — ACETAMINOPHEN 325 MG/1
650 TABLET ORAL
Status: DISCONTINUED | OUTPATIENT
Start: 2020-03-29 | End: 2020-04-02 | Stop reason: HOSPADM

## 2020-03-29 RX ORDER — HALOPERIDOL 5 MG/1
5 TABLET ORAL
Status: DISCONTINUED | OUTPATIENT
Start: 2020-03-29 | End: 2020-04-02 | Stop reason: HOSPADM

## 2020-03-29 RX ORDER — PENICILLIN V POTASSIUM 250 MG/1
500 TABLET, FILM COATED ORAL 2 TIMES DAILY
Status: DISCONTINUED | OUTPATIENT
Start: 2020-03-29 | End: 2020-04-02 | Stop reason: HOSPADM

## 2020-03-29 RX ORDER — IBUPROFEN 200 MG
1 TABLET ORAL DAILY
Status: DISCONTINUED | OUTPATIENT
Start: 2020-03-29 | End: 2020-04-02 | Stop reason: HOSPADM

## 2020-03-29 RX ORDER — BENZTROPINE MESYLATE 1 MG/1
1 TABLET ORAL
Status: DISCONTINUED | OUTPATIENT
Start: 2020-03-29 | End: 2020-04-02 | Stop reason: HOSPADM

## 2020-03-29 RX ORDER — BUPROPION HYDROCHLORIDE 150 MG/1
150 TABLET ORAL
Status: DISCONTINUED | OUTPATIENT
Start: 2020-03-29 | End: 2020-04-01

## 2020-03-29 RX ORDER — HYDROXYZINE PAMOATE 50 MG/1
50 CAPSULE ORAL
Status: DISCONTINUED | OUTPATIENT
Start: 2020-03-29 | End: 2020-04-02 | Stop reason: HOSPADM

## 2020-03-29 RX ADMIN — CEFTRIAXONE SODIUM 1 G: 1 INJECTION, POWDER, FOR SOLUTION INTRAMUSCULAR; INTRAVENOUS at 00:12

## 2020-03-29 RX ADMIN — PENICILLIN V POTASIUM 500 MG: 250 TABLET ORAL at 05:33

## 2020-03-29 RX ADMIN — PENICILLIN V POTASIUM 500 MG: 250 TABLET ORAL at 09:02

## 2020-03-29 RX ADMIN — HYDROXYZINE PAMOATE 50 MG: 50 CAPSULE ORAL at 18:00

## 2020-03-29 RX ADMIN — BUPROPION HYDROCHLORIDE 150 MG: 150 TABLET, EXTENDED RELEASE ORAL at 11:13

## 2020-03-29 RX ADMIN — TRAZODONE HYDROCHLORIDE 50 MG: 50 TABLET ORAL at 21:20

## 2020-03-29 RX ADMIN — PENICILLIN V POTASIUM 500 MG: 250 TABLET ORAL at 18:00

## 2020-03-29 RX ADMIN — ACETAMINOPHEN 1000 MG: 500 TABLET ORAL at 04:07

## 2020-03-29 NOTE — BSMART NOTE
42 yo male pt interviewed in ED room 21 at the request of Tanja SILVA. Pt resting quietly on bed upon my arrival. Reports coming to the ED related to drug use and having suicidal thoughts. Pt still endorsing s/i with no specific plan. He states \"I don't feel safe leaving here right now I need to get back on my medication so that I stop having these thoughts of wanting to hurt myself. \" denies h/i a/vh. States he does crack cocaine daily \"as much as I can get usually and I need help with that. I don't want to use anymore\" last use today prior to coming to the ED. Pt denies the drug use making his depression and suicidal ideations worse. \"I use drugs to numb myself so I don't have to deal with the depression and thoughts of wanting to die. \" denies drinking or any other drug use except \"marijuana\". Reports just being arrested today for a warrant. He got released on bond and came to the ED instead of acting on his suicidal thoughts. \"I know I need help so I came here. I need to get back on my meds and get clean and stay clean. It is ruining my life. \" reports being on probation for a previous theft charge. Pt states he is noncompliant with his home medications and has been for \"over a month\". Pt does have a history of being noncompliant with outpatient recommendations once discharged from [address] [place] [address]. Pt has not attended any recommended 12 step meetings. Pt does admit this is accurate but states \"I have to get help before I kill myself. \" pt reports asthma and bipolar. No other history reported. Pt last admission here at [facility] was 1- through [facility]. Pt tearful but cooperative with assessment. Denies any other issues and none were noted. Pt is agreeable to inpatient psychiatric treatment. aTnja SILVA notified.

## 2020-03-29 NOTE — H&P
[address]  Inpatient Admission Note    Date of Service:  03/29/20    Historian(s): Layton Robles and chart review  Referral Source: [facility] ED    Chief Complaint   Suicidal Ideation    History of Present Illness     Layton Robles is a 41 y.o. BLACK OR  male with a history of bipolar disorder and cocaine use disorder, severe who was admitted voluntarily from our ED for suicidal ideation with no definite plan. According to the crisis nurse, patient kept on insisting that he was suicidal and did not feel safe out of the confines of the hospital.  He reported that he had recently been bonded out of penitentiary yesterday and decided to come to the ED to seek help with his suicidal ideations instead of acting on them. He informed the nurse that he took a large amount of cocaine yesterday to harm himself. Patient is a fair historian. He expresses depressed mood and frustration with his cocaine addiction. He states \"I am here for the long haul and I am tired of using drugs. \"  He has had multiple admissions to this facility and usually request to be discharged after 2 days. Patient states he is here for the long haul because he wants to be transferred to a long-term rehab center and he is tired of using cocaine. He has been using cocaine for at least 10 years. He uses daily as much as he can get. He also uses marijuana occasionally. Patient says that his cocaine addiction has ruined his life. He has lost his relationship with his wife and other family members, he is unable to keep a job or have any finances, he is homeless and has legal issues. He has been to residential multiple times for theft and currently is on probation. Patient says he has lost everything and he is just at the point where he knows he needs to make a change but needs help. He is unable to tell me of any significant period of sobriety out of penitentiary.   He says his longest period of sobriety was 4 years during which he was incarcerated. He has been to rehab once with the [facility] in 2011. Patient also admits to using marijuana occasionally but says it is not his drug of choice. He denies any alcohol use. He smokes 1 pack of cigarettes daily. Patient has a diagnosis of bipolar disorder but only symptoms he can report are mood swings and difficulty with concentration. It is all also difficult to confirm this diagnosis given that he has not had any extensive periods of sobriety during which manic or  hypomanic symptoms were observed or documented. Patient at this time reports good appetite but weight loss of at least 20 pounds in the past 3 months due to cocaine use. He states he has been sleeping fairly well but his concentration is poor and he believes he has problems with his memory. Patient tested positive for strep throat in the ED and tested negative for the flu. Medical Review of Systems     Sleep: Fair  Appetite: Good    10 point review of systems was completed. Significant findings are found in the HPI or MSE. Psychiatric Treatment History     Patient has had numerous hospitalizations to this facility for suicidal ideation although he has actually never had a suicide attempt. He has a long history of treatment noncompliance and tends not to go for outpatient follow-up appointments. He is supposed to follow-up with the [address] [place] [address] [facility] but has actually never done so. He has been treated in the past with Depakote, Remeron and olanzapine. During 1 of his hospitalizations he initially endorsed auditory hallucinations but at the time he was discharged, he stated he never did have auditory hallucinations and just reported that symptom in order to be hospitalized.     Allergies    No Known Allergies    Medical History     Past Medical History:   Diagnosis Date    Acid reflux     Asthma     Bipolar disorder, unspecified ([facility] 3/1/2019    Cocaine use disorder, severe, dependence ([facility] Medication(s)     Prior to Admission Medications   Prescriptions Last Dose Informant Patient Reported? Taking?   cariprazine (VRAYLAR) 1.5 mg capsule   No No   Sig: Take 1 Cap by mouth nightly. Indications: bipolar depression   divalproex DR (DEPAKOTE) 500 mg tablet   No No   Sig: Take 1 Tab by mouth two (2) times a day. Indications: Bipolar I disorder, mre depressed. Facility-Administered Medications: None         Substance Abuse History     See HPI  Family History     History reviewed. No pertinent family history. Psychiatric Family History  Parents with a history of cocaine use when the patient was younger. He says they are now sober. Family history of suicide?  no    Social History     Patient is currently homeless. He is  but  from his wife. He has 2 daughters who are 21years old with 2 different women. Patient says he went as far as the 10th grade but had a learning disability and had to take \"Resource LD classes\". .  He says they kept promoting him from 1 class to the next without him actually mastering the material.  He reports that he has not had any significant period of employment. 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 He states he has been in detention at least 5-7 times in the past.  Longest sentence was 4.5 years and all the sentences were due to grand yvan. He is currently on probation.     Vitals/Labs      Vitals:    03/28/20 1828 03/29/20 0331 03/29/20 0606 03/29/20 0637   BP: 146/87 143/87 125/80 141/75   Pulse: 98 93 67 78   Resp: 12 16 16 18   Temp: 100.4 °F (38 °C) (!) 101.6 °F (38.7 °C) 98.2 °F (36.8 °C) 99 °F (37.2 °C)   SpO2: 97% 98% 98%    Weight: 86.2 kg (190 lb)      Height: 6' 2\" (1.88 m)          Labs:   Results for orders placed or performed during the hospital encounter of 03/28/20   INFLUENZA A & B AG (RAPID TEST)   Result Value Ref Range    Influenza A Antigen NEGATIVE  NEG      Influenza B Antigen NEGATIVE NEG     STREP AG SCREEN, GROUP A   Result Value Ref Range    Group A Strep Ag ID POSITIVE     DRUG SCREEN, URINE   Result Value Ref Range    BENZODIAZEPINES NEGATIVE  NEG      BARBITURATES NEGATIVE  NEG      THC (TH-CANNABINOL) POSITIVE (A) NEG      OPIATES NEGATIVE  NEG      PCP(PHENCYCLIDINE) NEGATIVE  NEG      COCAINE POSITIVE (A) NEG      AMPHETAMINES NEGATIVE  NEG      METHADONE NEGATIVE  NEG      HDSCOM (NOTE)    CBC WITH AUTOMATED DIFF   Result Value Ref Range    WBC 12.2 4.6 - 13.2 K/uL    RBC 5.43 4.70 - 5.50 M/uL    HGB 16.4 (H) 13.0 - 16.0 g/dL    HCT 49.1 (H) 36.0 - 48.0 %    MCV 90.4 74.0 - 97.0 FL    MCH 30.2 24.0 - 34.0 PG    MCHC 33.4 31.0 - 37.0 g/dL    RDW 13.6 11.6 - 14.5 %    PLATELET 841 263 - 311 K/uL    MPV 10.0 9.2 - 11.8 FL    NEUTROPHILS 81 (H) 40 - 73 %    LYMPHOCYTES 11 (L) 21 - 52 %    MONOCYTES 6 3 - 10 %    EOSINOPHILS 2 0 - 5 %    BASOPHILS 0 0 - 2 %    ABS. NEUTROPHILS 9.8 (H) 1.8 - 8.0 K/UL    ABS. LYMPHOCYTES 1.4 0.9 - 3.6 K/UL    ABS. MONOCYTES 0.8 0.05 - 1.2 K/UL    ABS. EOSINOPHILS 0.2 0.0 - 0.4 K/UL    ABS.  BASOPHILS 0.0 0.0 - 0.1 K/UL    DF AUTOMATED     METABOLIC PANEL, BASIC   Result Value Ref Range    Sodium 143 136 - 145 mmol/L    Potassium 3.9 3.5 - 5.5 mmol/L    Chloride 107 100 - 111 mmol/L    CO2 34 (H) 21 - 32 mmol/L    Anion gap 2 (L) 3.0 - 18 mmol/L    Glucose 61 (L) 74 - 99 mg/dL    BUN 10 7.0 - 18 MG/DL    Creatinine 1.27 0.6 - 1.3 MG/DL    BUN/Creatinine ratio 8 (L) 12 - 20      GFR est AA >60 >60 ml/min/1.73m2    GFR est non-AA >60 >60 ml/min/1.73m2    Calcium 8.8 8.5 - 10.1 MG/DL   ETHYL ALCOHOL   Result Value Ref Range    ALCOHOL(ETHYL),SERUM <3 0 - 3 MG/DL   URINALYSIS W/ RFLX MICROSCOPIC   Result Value Ref Range    Color DARK YELLOW      Appearance CLEAR      Specific gravity 1.025 1.005 - 1.030      pH (UA) 7.0 5.0 - 8.0      Protein NEGATIVE  NEG mg/dL    Glucose NEGATIVE  NEG mg/dL    Ketone TRACE (A) NEG mg/dL    Bilirubin NEGATIVE  NEG      Blood NEGATIVE  NEG      Urobilinogen 4.0 (H) 0.2 - 1.0 EU/dL    Nitrites NEGATIVE  NEG      Leukocyte Esterase SMALL (A) NEG     URINE MICROSCOPIC ONLY   Result Value Ref Range    WBC 15 to 20 0 - 4 /hpf    RBC NONE 0 - 5 /hpf    Epithelial cells FEW 0 - 5 /lpf    Bacteria FEW (A) NEG /hpf       Mental Status Examination     Appearance/Hygiene 41 y.o. BLACK OR  male  Hygiene: Fair   Behavior/Social Relatedness  appropriate   Musculoskeletal Gait/Station: appropriate  Tone (flaccid, cogwheeling, spastic): not assessed  Psychomotor (hyperkinetic, hypokinetic): calm  Involuntary movements (tics, dyskinesias, akathisa, stereotypies): none   Speech   Rate, rhythm, volume, fluency and articulation are appropriate   Mood   depressed   Affect    congruent   Thought Process Linear and goal directed    Vagueness, incoherence, circumstantiality, tangentiality, neologisms, perseveration, flight of ideas, or self-contradictory statements not present on assessment   Thought Content and Perceptual Disturbances Denies delusions, ideas of reference, overvalued ideas, ruminations, obsession, compulsions, and phobias    Denies self-injurious behavior (SIB), suicidal ideation (SI), aggressive behavior or homicidal ideation (HI)    Denies auditory and visual hallucinations   Sensorium and Cognition  AOx4, attention intact, memory intact, language use appropriate, and fund of knowledge age appropriate   Insight  Limited   Judgment  Limited       Suicide Risk Assessment     Admission  Date/Time: 03/29/20    [x] Admission  [] Discharge     Patient deemed to be at a moderate to high acute risk of suicide as he is endorsing suicidal ideations at this time. However he denies any prior history of suicide attempt. Risk factors include homelessness and poverty, chronic substance use, lack of social support, estrangement from family.       Assessment and Plan     Psychiatric Diagnoses:   Patient Active Problem List   Diagnosis Code  Cocaine use disorder, severe, dependence ([facility]) F14.20    Depressive disorder F32.9    Strep pharyngitis J02.0       Medical Diagnoses: Positive for strep throat    Psychosocial and contextual factors: Homeless, unemployed, lack of social support, chronic substance abuse and treatment noncompliance    Level of impairment/disability: Severe    Ania Lucas is a 41 y.o. who is currently requiring acute stabilization after suicidal ideation. 1. Admit to locked inpatient behavioral health unit. Start milieu, group, art and occupation therapy. 2. Start Wellbutrin  mg daily for depression. Continue antibiotic for strep throat. 3. Routine labs ordered and reviewed by this provider. 4. Reviewed instructions, risks, benefits and side effects. 5. Start disposition planning; possible referrals to rehab facilities if they are taking patients at this time.   6. Tentative date of discharge: 3-5 days       Sarah Vines MD  [address] [facility]

## 2020-03-29 NOTE — BH NOTES
Admission Note : SBAR report received from [facility] ED. Pt has a history of depression with suicidal ideation. He allegedly had outstanding warrant and went to [facility]. After leaving [facility] he states he became suicidal and used a large amount of cocaine to harm himself. While in the ED pt. Spiked a temp 101.4. He tested positive for Strept throat. He was started on antibiotic therapy. He will remain on precautions ( contact ). Pt is to wear a mask.

## 2020-03-29 NOTE — BH NOTES
Pt was notified that his black multipurpose tool and brown/tan folding knife may be disposed of by security.

## 2020-03-29 NOTE — ED NOTES
Spoke with [facility] care nurse who recommends  Blood cultures and lactic .   Discussed with Dr Sarahi Burk

## 2020-03-29 NOTE — BH NOTES
41year old -American male who presented to our unit after recently feeling suicidal and went to [facility] for an outstanding warrant. Pt was positive for cocaine. When asked how much cocaine that he uses daily, pt states, \"Too much. \" Pt also endorses smoking marijuana \"Once in a blue moon. \"Pt currently denies SI/HI and hallucinations of all types. Pt denies any other medical history but is positive for strep throat which is being treated with Veetid. He has been negative for a fever today but did spike a fever of 101.6 in the ER. Pt also endorses history of depression. Pt states that he is currently taking celexa, depakote, and remeron but is unsure of the dosage. Pt also endorses smoking 1 pack of cigarettes daily. Pt is calm and cooperative in day area. Will continue to monitor. RN's will initiate, develop, implement, review, or revise treatment plan.

## 2020-03-29 NOTE — H&P
History and Physical        Patient: Sacha Ocasio               Sex: male          DOA: 3/28/2020         YOB: 1980      Age:  41 y.o.        LOS:  LOS: 1 day        HPI:     Sacha Ocasio is a 41 y.o. AA male who was admitted experiencing suicidal ideation after going to senior living  regarding an outstanding warrant. Patient is also positive for strep and is being treated for that. Patient   states that he wants to leave here and be admitted to a rehab center. Principal Problem:    Depressive disorder (3/29/2020)    Active Problems:    Cocaine use disorder, severe, dependence ([facility] (10/8/2019)      Strep pharyngitis (3/29/2020)        Past Medical History:   Diagnosis Date    Acid reflux     Asthma     Bipolar disorder, unspecified ([facility] 3/1/2019    Cocaine use disorder, severe, dependence ([facility]        History reviewed. No pertinent surgical history. History reviewed. No pertinent family history. Social History     Socioeconomic History    Marital status:      Spouse name: Not on file    Number of children: Not on file    Years of education: Not on file    Highest education level: Not on file   Tobacco Use    Smoking status: Current Every Day Smoker     Packs/day: 1.00    Smokeless tobacco: Never Used   Substance and Sexual Activity    Alcohol use: Not Currently    Drug use: Yes     Types: Cocaine    Sexual activity: Not Currently       Prior to Admission medications    Medication Sig Start Date End Date Taking? Authorizing Provider   divalproex DR (DEPAKOTE) 500 mg tablet Take 1 Tab by mouth two (2) times a day. Indications: Bipolar I disorder, mre depressed. 1/27/20   Stella Rizvi MD   cariprazine (VRAYLAR) 1.5 mg capsule Take 1 Cap by mouth nightly.  Indications: bipolar depression 1/27/20   Stella Rizvi MD       No Known Allergies    Review of Systems  A comprehensive review of systems was negative except for that written in the History of Present Illness. Physical Exam:      Visit Vitals  /86   Pulse 100   Temp 97.3 °F (36.3 °C)   Resp 19   Ht 6' 2\" (1.88 m)   Wt 86.2 kg (190 lb)   SpO2 98%   BMI 24.39 kg/m²       Physical Exam:  Physical Exam:   General:  Alert, cooperative, pleasant, no distress, appears stated age. States that he is under weight at 160 lbs and is requesting Insure to help him gain weight. Eyes:  Conjunctivae/corneas clear. PERRL, EOMs intact. Fundi benign   Ears:  Normal TMs and external ear canals both ears. Nose: Nares normal. Septum midline. Mucosa normal. No drainage or sinus tenderness. Mouth/Throat: Lips, mucosa, and tongue normal. Teeth and gums normal.   Neck: Supple, symmetrical, trachea midline, no adenopathy, thyroid: no enlargement/tenderness/nodules, no carotid bruit and no JVD. Back:   Symmetric, no curvature. ROM normal. No CVA tenderness. Lungs:   Clear to auscultation bilaterally. Heart:  Regular rate and rhythm, S1, S2 normal, no murmur, click, rub or gallop. Abdomen:   Soft, non-tender. Bowel sounds normal. No masses,  No organomegaly. Extremities: Extremities normal, atraumatic, no cyanosis or edema. Pulses: 2+ and symmetric all extremities. Skin: Skin color, texture, turgor normal. No rashes or lesions   Lymph nodes: Cervical, supraclavicular, and axillary nodes normal.   Neurologic: CNII-XII intact. Normal strength, sensation and reflexes throughout. Assessment/Plan     Patient was cooperative and appropriate. Plan is for patient to participate in all unit activities   and take medications as prescribed. He is also expected to follow all of his physician's discharge  Orders.

## 2020-03-29 NOTE — BSMART NOTE
ART THERAPY GROUP PROGRESS NOTE PATIENT SCHEDULED FOR GROUP AT: 9:30 
 
ATTENDANCE: Full PARTICIPATION LEVEL: Does not engage in the art process. ATTENTION LEVEL: Able to focus on task. FOCUS: Self Affirmation SYMBOLIC & THEMATIC CONTENT AS NOTED IN IMAGERY: He presented with a calm mood and congruent affect and his thought processes were logical. He was somewhat engaged in the group therapy process, he was unable to engage in art making due to eating breakfast however requested to participate through engaging in group discussions. He was actively engaged in group discussions and was supportive towards other group members providing encouragement.

## 2020-03-29 NOTE — BH NOTES
Pt has been withdrawn to room for most of the afternoon. Pt has been calm and cooperative and is medication compliant. Will continue to monitor.

## 2020-03-29 NOTE — ED NOTES
Report called to [facility] on behavioral floor. Report included current vital signs. Current demeanors.

## 2020-03-30 PROCEDURE — 65220000001 HC RM PRIVATE PSYCH

## 2020-03-30 PROCEDURE — 74011250637 HC RX REV CODE- 250/637: Performed by: PSYCHIATRY & NEUROLOGY

## 2020-03-30 PROCEDURE — 74011250637 HC RX REV CODE- 250/637: Performed by: EMERGENCY MEDICINE

## 2020-03-30 RX ADMIN — TRAZODONE HYDROCHLORIDE 50 MG: 50 TABLET ORAL at 21:06

## 2020-03-30 RX ADMIN — PENICILLIN V POTASIUM 500 MG: 250 TABLET ORAL at 08:16

## 2020-03-30 RX ADMIN — PENICILLIN V POTASIUM 500 MG: 250 TABLET ORAL at 17:41

## 2020-03-30 RX ADMIN — BUPROPION HYDROCHLORIDE 150 MG: 150 TABLET, EXTENDED RELEASE ORAL at 06:38

## 2020-03-30 NOTE — BSMART NOTE
ART THERAPY GROUP PROGRESS NOTE Group time:945 Because of the status of the Patient milieu, staff reccommended group not be held.

## 2020-03-30 NOTE — PROGRESS NOTES
[address]  Inpatient Progress Note     Date of Service: 03/30/20  Hospital Day: 2     Subjective/Interval History   03/30/20    Treatment Team Notes:  Notes reviewed and/or discussed and report that Sujatha Che is a 40 y/o male admitted for SI in the setting of Cocaine use. No behavioral issues overnight per nursing staff. Patient interview: Sujatha Che was interviewed by this writer today. Patient reports mood is better today. He denies suicidal ideations today. He says he is very hungry and is requesting Ensure with meals. Otherwise, he slept well last night and is complaining of fatigue. His plan for today is to \"stay here\". He is tolerating Wellbutrin. Sore throat is improving. Objective     Visit Vitals  /88 (BP 1 Location: Right arm, BP Patient Position: Sitting)   Pulse 78   Temp 97.3 °F (36.3 °C)   Resp 18   Ht 6' 2\" (1.88 m)   Wt 86.2 kg (190 lb)   SpO2 98%   BMI 24.39 kg/m²       No results found for this or any previous visit (from the past 24 hour(s)). Mental Status Examination     Appearance/Hygiene 41 y.o.  BLACK OR  male  Hygiene: Fair   Behavior/Social Relatedness Appropriate   Musculoskeletal Gait/Station: appropriate  Tone (flaccid, cogwheeling, spastic): not assessed  Psychomotor (hyperkinetic, hypokinetic): calm   Involuntary movements (tics, dyskinesias, akathisa, stereotypies): none   Speech   Rate, rhythm, volume, fluency and articulation are appropriate   Mood   euthymic   Affect    congruent   Thought Process Linear and goal directed   Thought Content and Perceptual Disturbances Denies self-injurious behavior (SIB), suicidal ideation (SI), aggressive behavior or homicidal ideation (HI)    Denies auditory and visual hallucinations   Sensorium and Cognition  Grossly intact   Insight  Limited   Judgment  fair        Assessment/Plan      Psychiatric Diagnoses:   Patient Active Problem List   Diagnosis Code    Cocaine use disorder, severe, dependence ([facility]) F14.20    Depressive disorder F32.9    Strep pharyngitis J02.0         Psychosocial and contextual factors: Homeless, lack of social support, unemployment, treatment noncompliance, addiction    Level of impairment/disability: Jaden Erickson is a 41 y.o. who is currently admitted for suicidal ideation but denies SI today. 1.  Continue Wellbutrin  mg daily. 2.  Nutrition consult. 3.  Disposition/Discharge Date: self-care/home, patient to be referred to rehab facilities.     Susanne Mercedes MD DR. \[facility] [place] [facility]\""[facility]  Psychiatry

## 2020-03-30 NOTE — BSMART NOTE
OCCUPATIONAL THERAPY PROGRESS NOTE Group time:1530 The patient did not refuse, but, did not come to group. In day area when group called, returned to his room.

## 2020-03-30 NOTE — BH NOTES
STEW RINCON Note: The above pt has been in the bed majority of the shift. He has been demanding and sarcastic with staff majority of the shift. He has been compliant with medication this shift. He has not experienced any falls this shift. He has been watching television during this shift. He verbally contract for safety and denies any self-harm this shift.

## 2020-03-30 NOTE — BSMART NOTE
SW Contact:  
 
 Summary: The patient is a 37year old male currently with poor relationships with wife & family, admitted to this facility on a voluntary basis. Patient has a history of being depressed, bipolar disorder, some of this being associated to his daily cocaine use disorder, describing that his depression had gotten worse as he was just bonded out of skilled nursing this weekend after doing a large amount of cocaine in attempt to harm himself. 
  
Assessment/Intervention: writer met with patient to remind him of my role on tx team including initial discussion of his tx plan. Patient was cooperative but reported \"I really want to deal with this drug thing, not leave quick like I usually do. Patient informed  that he needs to get back on track as \"the drugs\" and other life stressors were becoming overwhelming including so much he's lost over the years. Currently homeless & in legal trouble., with hx of multiple incarcerations. Pt has been referred to [facility] for substance abuse & NA meetings with poor d/c compliance. Writer encouraged pt to start laying a foundation for future success by  Attending psycho-educational groups while in treatment. Patient was also reminded this would include samantha for his safety as we look for potential programs he may be eligible for. Pt also given [address] # [phone] TO IDENTIFY HIS  (PT REPORTS \"HAVEN'T SPOKEN TO HIM IN A MINUTE\") AND CHECK ON HIS STATUS. Dr & Tx Team given updates.

## 2020-03-30 NOTE — PROGRESS NOTES
Problem: Depressed Mood (Adult/Pediatric)  Goal: *STG: Attends activities and groups  Description: Pt will be encouraged to attend at least 3 out of 5 groups daily. Outcome: Progressing Towards Goal     Problem: Depressed Mood (Adult/Pediatric)  Goal: *STG: Remains safe in hospital  Description: Pt will remain safe in hospital AEB absence of SI, intent, plan or any self-injurious behaviors upon assessment every shift. Outcome: Progressing Towards Goal     Problem: Depressed Mood (Adult/Pediatric)  Goal: *STG: Complies with medication therapy  Description: Pt will be encouraged to take all medications as prescribed daily and will be able to identify all medications he is taking  before discharge. Outcome: Progressing Towards Goal   Patient pleasant and cooperative. Denies SI/HI/AH. Contracts for safety on unit. Compliant with medications. Needed several reminders to wear his mask. Patient remains on antibiotic therapy with no report of side effects or adverse reactions; afebrile. Attends groups of choice. Will continue to monitor for safety.

## 2020-03-30 NOTE — BSMART NOTE
SOCIAL WORK GROUP THERAPY PROGRESS NOTE Group Time:  10:45am 
 
Group Topic:  Coping Skills Group Participation:  
 
Pt expressed not interested in attending session despite staff support

## 2020-03-31 LAB
C TRACH RRNA SPEC QL NAA+PROBE: POSITIVE
N GONORRHOEA RRNA SPEC QL NAA+PROBE: POSITIVE
SPECIMEN SOURCE: ABNORMAL

## 2020-03-31 PROCEDURE — 74011250637 HC RX REV CODE- 250/637: Performed by: PSYCHIATRY & NEUROLOGY

## 2020-03-31 PROCEDURE — 74011250637 HC RX REV CODE- 250/637: Performed by: EMERGENCY MEDICINE

## 2020-03-31 PROCEDURE — 65220000001 HC RM PRIVATE PSYCH

## 2020-03-31 RX ADMIN — BUPROPION HYDROCHLORIDE 150 MG: 150 TABLET, EXTENDED RELEASE ORAL at 06:11

## 2020-03-31 RX ADMIN — PENICILLIN V POTASIUM 500 MG: 250 TABLET ORAL at 08:02

## 2020-03-31 RX ADMIN — TRAZODONE HYDROCHLORIDE 50 MG: 50 TABLET ORAL at 20:24

## 2020-03-31 RX ADMIN — PENICILLIN V POTASIUM 500 MG: 250 TABLET ORAL at 17:33

## 2020-03-31 NOTE — GROUP NOTE
JUANIS  GROUP DOCUMENTATION INDIVIDUAL Group Therapy Note Date: 3/31/2020 Group Start Time: 0830 Group End Time: 0900 Group Topic: Nursing [facility] ADULT CHEM DEP John Green RN 
 
[facility] GROUP DOCUMENTATION GROUP Group Therapy Note Attendees: 5 Attendance: Attended Interventions/techniques: Informed and Reinforced Follows Directions: Followed directions Interactions: Interacted appropriately Mental Status: Calm Behavior/appearance: Attentive Goals Achieved: Able to self-disclose and Discussed discharge plans Dave Krishnan, RN

## 2020-03-31 NOTE — BSMART NOTE
SOCIAL WORK GROUP THERAPY PROGRESS NOTE Group Time:  10:45am 
 
Group Topic:  Coping Skills    C D Issues Group Participation:  
 
Pt moderately involved during  discussion but remained attentive. \"Seven Steps\" for taking responsibility for our Happiness was reviewed including commitment to change, self-care, setting limits, goal setting & letting go. Pt admitted Commitment & consistency his biggest challenges. Taught client about relationship between mood disorders & self medicating them.

## 2020-03-31 NOTE — PROGRESS NOTES
Problem: Depressed Mood (Adult/Pediatric)  Goal: *STG: Attends activities and groups  Description: Pt will be encouraged to attend at least 3 out of 5 groups daily. Outcome: Progressing Towards Goal  Note: Patient will attend at least 2 group activities daily  Goal: *STG: Remains safe in hospital  Description: Pt will remain safe in hospital AEB absence of SI, intent, plan or any self-injurious behaviors upon assessment every shift. Outcome: Progressing Towards Goal  Note: Patient will remain safe for duration of hospital stay  Goal: *STG: Complies with medication therapy  Description: Pt will be encouraged to take all medications as prescribed daily and will be able to identify all medications he is taking  before discharge. Outcome: Progressing Towards Goal  Note: Patient will comply with medication therapy daily     Patient is entitled but cooperative upon approach. Patient denies any SI/HI or AVH at this time with this writer. Patient is compliant with all medications and remains afrebrile. Patient attends only select groups and is sociable among select peers. Patient has required multiple reminders to keep his mask on and stated that we are going \"overboard with the mask\".  Will continue to monitor for safety and provide therapeutic interventions as needed

## 2020-03-31 NOTE — BSMART NOTE
SW Contact:   
 
Summary: The patient is a 37year old male currently with poor relationships with wife & family, admitted to this facility on a voluntary basis. Patient has a history of being depressed, bipolar disorder, some of this being associated to his daily cocaine use disorder, describing that his depression had gotten worse as he was just bonded out of correction this weekend after doing a large amount of cocaine in attempt to harm himself. Assessment / Intervention:  As follow up to yesterday's contact pt confirmed he \"did\" call probation & parole for [place] (after Dr reminded him this am) and was unable to get any info & was told by  to \" call back next week\". ( WRITER DID VERIFY THIS ACCURATE INFO). As tx plan discussed pt started asking questions about filing for \"disability\" due to his hx of LD, substance abuse & inability to get his life together, due to how stressful his life has been. Writer answered many of his concerns above as well as how to apply. Pt disappointed we unable to start process here & asked if application could be sent here to [employer]. That not option either. Reviewed with pt need for forming plan for positive change & staying committed to his goals with major component the \"Laying a foundation by getting clean & sober\". He still verbalizing wanting to do that & writer reminded him we'll look into x30 day tx options. Dr & tx team given update.

## 2020-03-31 NOTE — BSMART NOTE
OCCUPATIONAL THERAPY PROGRESS NOTE Group Time:  0202 Attendance: The patient attended full group. Participation: The patient participated fully in the activity. Attention: The patient was able to focus on the activity. Yung Ra Interaction: The patient frequently interacts with others. Discussed addiction, recovery and changes needed.

## 2020-03-31 NOTE — BH NOTES
Patient visible on unit. Denies SI/HI/AH. Contracts for safety. Compliant with medications. Remains on antibiotic therapy with no report of side effects or adverse reactions. Attended group activities. Sociable and friendly with staff and peers. Will continue to monitor and offer support as needed.

## 2020-03-31 NOTE — PROGRESS NOTES
NUTRITION    Nutrition Consult: General Nutrition Management & Supplements    RECOMMENDATIONS / PLAN:     - Continue nutritional supplements. - Continue RD inpatient monitoring and evaluation     NUTRITION DIAGNOSIS & INTERVENTIONS:     - Meals/snacks: general healthy diet  - Medical food supplement therapy: Ensure Enlive, once daily    Nutrition Diagnosis: No nutrition diagnosis at this time. ASSESSMENT:     Admitted experiencing SI without plan, hx of cocaine use disorder. Pt reports wt loss x several months with poor intake 2/2 cocaine use. Pt reports he has been consuming 100% of meals since admission along with Ensure at dinner meal. Tolerating diet. Planning for rehab facility upon discharge. Nutritional intake adequate to meet patients estimated nutritional needs:  Yes    Diet: DIET REGULAR  DIET NUTRITIONAL SUPPLEMENTS Dinner; ENSURE ENLIVE    Food Allergies: NKFA  Current Appetite: Good  Appetite/meal intake prior to admission: Poor; 1 meal/day; cocaine use disorder; homeless  Feeding Limitations:  [] Swallowing Difficulty       [] Chewing Difficulty       [] Other   Current Meal Intake: No data found. Gastrointestinal Issues:  [] Yes    [x] No: none known   Skin Integrity:  WDL    Pertinent Medications:  Reviewed   Labs:  Reviewed     Anthropometrics:  Ht Readings from Last 1 Encounters:   03/28/20 6' 2\" (1.88 m)       Last 3 Recorded Weights in this Encounter    03/28/20 1828   Weight: 86.2 kg (190 lb)       Body mass index is 24.39 kg/m². Weight History: Pt reports a 20-40# wt loss x 3 months PTA. Per chart hx review fluctuations noted in wt hx.   Weight Metrics 3/28/2020 1/25/2020 11/8/2019 10/8/2019 9/17/2019 8/18/2019 3/1/2019   Weight 190 lb 189 lb 166 lb 180 lb 199 lb 199 lb 220 lb   BMI 24.39 kg/m2 24.27 kg/m2 21.31 kg/m2 23.11 kg/m2 25.55 kg/m2 25.55 kg/m2 28.25 kg/m2       Admitting Diagnosis: Bipolar 1 disorder (HCC) [F31.9]  Past Medical History:   Diagnosis Date    Acid reflux  Asthma     Bipolar disorder, unspecified ([facility] 3/1/2019    Cocaine use disorder, severe, dependence ([facility]         Education Needs:        [x] None identified  [] Identified - Not appropriate at this time  []  Identified and addressed - refer to education log  Learning Limitations:   [x] None identified  [] Identified    Cultural, Alevism & ethnic food preferences identified:  [x] None    [] Yes      ESTIMATED NUTRITION NEEDS:     4121-9910 kcal (MSJx1.2-1.4), 69-86 gm protein (0.8-1 gm/kg), 1 mL/kcal  Based on: 86 kg       [x] Actual BW      [] IBW          MONITORING & EVALUATION:     Nutrition Goal(s):   - PO nutrition intake will continue to meet >75% of patients estimated nutritional needs over the next 7 days. Outcome: New/Initial goal     Monitor: [x] Food and nutrient intake   [x] Food and nutrient administration  [x] Comparative standards   [x] Nutrition-focused physical findings   [x] Anthropometric Measurements   [x] Treatment/therapy   [x] Biochemical data, medical tests, and procedures         Previous Recommendations (for follow-up assessments only):    Not Applicable      Discharge Planning: No nutritional discharge needs at this time.    [x]  Participated in care planning, discharge planning, & interdisciplinary rounds as appropriate      Danielle Sharma RD   Pager: [phone]

## 2020-03-31 NOTE — PROGRESS NOTES
[address] [facility] [address]  Inpatient Progress Note     Date of Service: 03/31/20  Hospital Day: 3     Subjective/Interval History   03/31/20    Treatment Team Notes:  Notes reviewed and/or discussed and report that Juan Cheng is a 40 y/o male admitted for SI in the setting of Cocaine use. No behavioral issues overnight per nursing staff. Patient interview: Juan Cheng was interviewed by this writer today. Patient reports mood is better today. He denies suicidal ideations today. He is complaining of stomach ache after taking Wellbutrin before breakfast. Otherwise, he needs to contact his  today to check his status and find out how far he can travel from the area to go for rehab. Objective     Visit Vitals  /59 (BP 1 Location: Right arm, BP Patient Position: Sitting)   Pulse 68   Temp 97.4 °F (36.3 °C)   Resp 16   Ht 6' 2\" (1.88 m)   Wt 86.2 kg (190 lb)   SpO2 93%   BMI 24.39 kg/m²       No results found for this or any previous visit (from the past 24 hour(s)). Mental Status Examination     Appearance/Hygiene 41 y.o.  BLACK OR  male  Hygiene: Fair   Behavior/Social Relatedness Appropriate   Musculoskeletal Gait/Station: appropriate  Tone (flaccid, cogwheeling, spastic): not assessed  Psychomotor (hyperkinetic, hypokinetic): calm   Involuntary movements (tics, dyskinesias, akathisa, stereotypies): none   Speech   Rate, rhythm, volume, fluency and articulation are appropriate   Mood   euthymic   Affect    congruent   Thought Process Linear and goal directed   Thought Content and Perceptual Disturbances Denies self-injurious behavior (SIB), suicidal ideation (SI), aggressive behavior or homicidal ideation (HI)    Denies auditory and visual hallucinations   Sensorium and Cognition  Grossly intact   Insight  Limited   Judgment  fair        Assessment/Plan      Psychiatric Diagnoses:   Patient Active Problem List   Diagnosis Code    Cocaine use disorder, severe, dependence (HCC) F14.20    Depressive disorder F32.9    Strep pharyngitis J02.0         Psychosocial and contextual factors: Homeless, lack of social support, unemployment, treatment noncompliance, addiction    Level of impairment/disability: Jaden Robles is a 41 y.o. who is currently admitted for suicidal ideation but denies SI today. 1.  Continue Wellbutrin  mg daily. 2.  Disposition/Discharge Date: self-care/home, patient to be referred to rehab facilities.     Kimberly Stanley MD  [facility]  [place]iatry

## 2020-03-31 NOTE — GROUP NOTE
IP  GROUP DOCUMENTATION INDIVIDUAL Group Therapy Note Date: 3/30/2020 Group Start Time: 80 Group End Time: 2000 Group Topic: Nursing [facility] ADULT CHEM DEP Ryan Steinberg, RN 
 
IP  GROUP DOCUMENTATION GROUP Group Therapy Note Attendees: 5 Attendance: Attended Interventions/techniques: Challenged and Informed Follows Directions: Followed directions Interactions: Interacted appropriately Mental Status: Calm Behavior/appearance: Attentive Goals Achieved: Able to engage in interactions and Able to listen to others Precious Del Valle.  Miguel Lezama

## 2020-04-01 LAB
SPECIMEN SOURCE: NORMAL
T VAGINALIS RRNA VAG QL NAA+PROBE: NEGATIVE

## 2020-04-01 PROCEDURE — 74011250637 HC RX REV CODE- 250/637: Performed by: PSYCHIATRY & NEUROLOGY

## 2020-04-01 PROCEDURE — 65220000001 HC RM PRIVATE PSYCH

## 2020-04-01 PROCEDURE — 74011250637 HC RX REV CODE- 250/637: Performed by: EMERGENCY MEDICINE

## 2020-04-01 RX ORDER — BUPROPION HYDROCHLORIDE 150 MG/1
150 TABLET ORAL
Status: DISCONTINUED | OUTPATIENT
Start: 2020-04-02 | End: 2020-04-02 | Stop reason: HOSPADM

## 2020-04-01 RX ADMIN — BUPROPION HYDROCHLORIDE 150 MG: 150 TABLET, EXTENDED RELEASE ORAL at 08:30

## 2020-04-01 RX ADMIN — PENICILLIN V POTASIUM 500 MG: 250 TABLET ORAL at 08:30

## 2020-04-01 RX ADMIN — TRAZODONE HYDROCHLORIDE 50 MG: 50 TABLET ORAL at 21:23

## 2020-04-01 RX ADMIN — PENICILLIN V POTASIUM 500 MG: 250 TABLET ORAL at 18:05

## 2020-04-01 NOTE — BSMART NOTE
Summary: The patient is a 37year old male currently with poor relationships with wife & family, admitted to this facility on a voluntary basis. Patient has a history of being depressed, bipolar disorder, some of this being associated to his daily cocaine use disorder, describing that his depression had gotten worse as he was just bonded out of detention this weekend after doing a large amount of cocaine in attempt to harm himself. Patient reports the need for substance abuse treatment. SOL submitted application to [facility] for possible placement. SOL contacted [facility] in [place] [address] who declined patient due to no availability. SOL informed Ms. Barriga to contact  if space becomes available. Patient has been declined to [facility] in [place] due to no availability. Patient is declined for [facility] due to his insurance. SW will encourage patient to seek assistance with [facility] in [place] for housing and treatment. Patient is encouraged to contact shelter for phone interview. SW will continue to assist as needed. Von Arora LCSW-BERNARDINO

## 2020-04-01 NOTE — BH NOTES
Pt appeared to have slept for 5.75 hours thus far. Pt came out for snacks/beverage and retired back to bed. Will continue to monitor for safety.

## 2020-04-01 NOTE — GROUP NOTE
[facility] GROUP DOCUMENTATION INDIVIDUAL Group Therapy Note Date: 3/31/2020 Group Start Time: 2030 Group End Time: 2045 Group Topic: Nursing [facility] ADULT CHEM FABIÁN Stevens, RN 
 
[facility] GROUP DOCUMENTATION GROUP Group Therapy Note Attendees: 5 Attendance: Attended Interventions/techniques: Challenged and Informed Follows Directions: Followed directions Interactions: Interacted appropriately Mental Status: Calm Behavior/appearance: Attentive and Cooperative Goals Achieved: Able to engage in interactions and Able to listen to others Ever Ortega.  Kerwin Gutierrez

## 2020-04-01 NOTE — BSMART NOTE
ART THERAPY GROUP PROGRESS NOTE PATIENT SCHEDULED FOR GROUP AT: 6839 ATTENDANCE: Full PARTICIPATION LEVEL: Participates fully in the art process FOCUS: Coping skills SYMBOLIC & THEMATIC CONTENT AS NOTED IN IMAGERY: He was cheerful and presented with a bright affect. He was invested in the task and shared that he tends to struggle with self-doubt, which feeds into his return to substance abuse. He claimed that he would like to help others with addiction one day, but realizes he needs to get the help first. He claimed that he hopes to go to [facility].

## 2020-04-01 NOTE — PROGRESS NOTES
Problem: Falls - Risk of  Goal: *Absence of Falls  Description: Document Alena Lemon Fall Risk and appropriate interventions in the flowsheet every shift and pt will be absent of falls daily and throughout hospitalization. Outcome: Progressing Towards Goal as evidence by being free from falls this shift. Note: Fall Risk Interventions:    Medication Interventions: Teach patient to arise slowly      Problem: Crack/Cocaine Withdrawal  Goal: *STG: Attends activities and groups  Description: Pt will be encouraged to attend 3 out of 5 groups daily. Outcome: Progressing Towards Goal as evidence by attending all groups and activities during this shift. Patient has been in day area most of this shift. Patient affect brighter this shift since last shift with this nurse. Patient voices interest in inpatient substance abuse treatment. Patient has been compliant with scheduled medications this shift and has not required PRN medications this shift. Patient has been pleasant with frequent interaction (safe distance) with staff and peers. Patient has been compliant with unit guidelines, free from falls and harm. Will continue to monitor and provide interventions as appropriate.

## 2020-04-01 NOTE — PROGRESS NOTES
[address]  Inpatient Progress Note     Date of Service: 04/01/20  Hospital Day: 4     Subjective/Interval History   04/01/20    Treatment Team Notes:  Notes reviewed and/or discussed and report that Irma Gaines is a 40 y/o male admitted for SI in the setting of Cocaine use. No behavioral issues overnight per nursing staff. Patient interview: Irma Gaines was interviewed by this writer today. Patient reports euthymic mood. He is very concerned about being discharged back to the streets as there is a high risk of him relapsing and he does not want to do that. Patient says he is highly motivated to stay clean and really wants to go to a 30-day rehab facility to get more support and help. He attempted to contact his  yesterday but was told to call back next week. He does not see any reason why he cannot go to a local rehab facility. Objective     Visit Vitals  /74 (BP 1 Location: Right arm, BP Patient Position: Sitting)   Pulse 75   Temp 97.9 °F (36.6 °C)   Resp 18   Ht 6' 2\" (1.88 m)   Wt 86.2 kg (190 lb)   SpO2 93%   BMI 24.39 kg/m²       No results found for this or any previous visit (from the past 24 hour(s)). Mental Status Examination     Appearance/Hygiene 41 y.o.  BLACK OR  male  Hygiene: Fair   Behavior/Social Relatedness Appropriate   Musculoskeletal Gait/Station: appropriate  Tone (flaccid, cogwheeling, spastic): not assessed  Psychomotor (hyperkinetic, hypokinetic): calm   Involuntary movements (tics, dyskinesias, akathisa, stereotypies): none   Speech   Rate, rhythm, volume, fluency and articulation are appropriate   Mood   euthymic   Affect    congruent   Thought Process Linear and goal directed   Thought Content and Perceptual Disturbances Denies self-injurious behavior (SIB), suicidal ideation (SI), aggressive behavior or homicidal ideation (HI)    Denies auditory and visual hallucinations   Sensorium and Cognition Grossly intact   Insight  Limited   Judgment  fair        Assessment/Plan      Psychiatric Diagnoses:   Patient Active Problem List   Diagnosis Code    Cocaine use disorder, severe, dependence ([facility] F14.20    Depressive disorder F32.9    Strep pharyngitis J02.0         Psychosocial and contextual factors: Homeless, lack of social support, unemployment, treatment noncompliance, addiction    Level of impairment/disability: Moderate    Shade Quivers is a 41 y.o. who is currently admitted for suicidal ideation but denies SI today. 1.  Continue Wellbutrin  mg daily. 2.  Disposition/Discharge Date: self-care/home, patient to be referred to rehab facilities.     Justus Thomson MD  [facility]  Psychiatry

## 2020-04-01 NOTE — BSMART NOTE
COUNSELING GROUP PROGRESS NOTE PATIENT SCHEDULED FOR GROUP AT: 12:30 
 
ATTENDANCE: Full PARTICIPATION LEVEL: Participates fully in the group process. FOCUS: Self Affirmation THEMATIC CONTENT AS NOTED IN REFLECTION: He presented with a calm mood and congruent affect and his thought processes were logical. He was observed engaging the group therapy directive and appeared to be invested in the task at hand. Thematic content was organized and appropriate for the given directive and focused on a theme of \"comfort zones. \"

## 2020-04-01 NOTE — BH NOTES
Patient informed this nurse that he spoke with MD and reported that it was making his stomach hurt.   Patient did not refuse medication, but requested to take it after breakfast.  Time changed on MAR to reflect pt request.

## 2020-04-01 NOTE — PROGRESS NOTES
conducted an Spirituality Group for Carlos Reed, who is a 41 y. o.,male. Patient's Primary Language is: [place]. According to the patient's EMR Evangelical Affiliation is: [place].     The reason the Patient came to the hospital is:   Patient Active Problem List    Diagnosis Date Noted    Depressive disorder 03/29/2020    Strep pharyngitis 03/29/2020    Cocaine use disorder, severe, dependence ([facility] 10/08/2019         conducted Spirituality Group \"Fears\" and the patient was one of the participants. The  provided the following Interventions:  Continued the relationship of care and support. Listened empathically. Offered prayer and assurance of continued prayer on patients behalf. Chart reviewed. The following outcomes were achieved:  Patient expressed gratitude for 's visit.  w  Assessment:  There are no further spiritual or Oriental orthodox issues which require Spiritual Care Services interventions at this time. Plan:  Chaplains will continue to follow and will provide pastoral care on an as needed/requested basis.  recommends bedside caregivers page  on duty if patient shows signs of acute spiritual or emotional distress. Chaplain Molina Silva[address] [place] [address]   [phone]

## 2020-04-02 VITALS
DIASTOLIC BLOOD PRESSURE: 74 MMHG | BODY MASS INDEX: 24.38 KG/M2 | WEIGHT: 190 LBS | OXYGEN SATURATION: 93 % | SYSTOLIC BLOOD PRESSURE: 126 MMHG | RESPIRATION RATE: 18 BRPM | HEART RATE: 75 BPM | TEMPERATURE: 97 F | HEIGHT: 74 IN

## 2020-04-02 PROCEDURE — 74011250637 HC RX REV CODE- 250/637: Performed by: EMERGENCY MEDICINE

## 2020-04-02 PROCEDURE — 74011250637 HC RX REV CODE- 250/637: Performed by: PSYCHIATRY & NEUROLOGY

## 2020-04-02 RX ORDER — PENICILLIN V POTASSIUM 500 MG/1
500 TABLET, FILM COATED ORAL 2 TIMES DAILY
Qty: 14 TAB | Refills: 0 | Status: SHIPPED | OUTPATIENT
Start: 2020-04-02 | End: 2020-04-09

## 2020-04-02 RX ORDER — BUPROPION HYDROCHLORIDE 150 MG/1
150 TABLET ORAL
Qty: 30 TAB | Refills: 0 | Status: SHIPPED | OUTPATIENT
Start: 2020-04-03 | End: 2021-01-29

## 2020-04-02 RX ADMIN — PENICILLIN V POTASIUM 500 MG: 250 TABLET ORAL at 08:09

## 2020-04-02 RX ADMIN — BUPROPION HYDROCHLORIDE 150 MG: 150 TABLET, FILM COATED, EXTENDED RELEASE ORAL at 08:09

## 2020-04-02 NOTE — BH NOTES
Pt received pt survey, prescriptions, discharge instructions, and emergency numbers. Pt's multi-purpose tool and knife was taken downstairs by ER staff. All other personal belongings returned to pt. Pt encouraged to call with any questions or concerns.

## 2020-04-02 NOTE — DISCHARGE INSTRUCTIONS
BEHAVIORAL HEALTH NURSING DISCHARGE NOTE      The following personal items collected during your admission are returned to you:   Dental Appliance: Dental Appliances: None  Vision: Visual Aid: None  Hearing Aid:    Jewelry: Jewelry: Earrings, Willie Bernal, Louis  Clothing: Clothing: Belt, Footwear, Pants, Shirt, Socks, Undergarments  Other Valuables: Other Valuables: Lighter/matches, Money (comment), Pocket knife, Weapons (security notified), Joseph Ivis sent to safe: Personal Items Sent to Safe: Knives x 2,Ring x 2,VISA,MC,$2.00,Watch      PATIENT INSTRUCTIONS:    Pt has discharge instructions, prescriptions, and emergency numbers. Pt verbalized understanding. The discharge information has been reviewed with the patient. The patient verbalized understanding.

## 2020-04-02 NOTE — BH NOTES
On 4/1/20 during the 3-11 pm shift, patient appeared to be in a pleasant mood. Patient was talkative and friendly with other staff. Patient attended both the nursing and community group and participated. Patient sat in the dayroom for the majority of the shift socializing with other patients and staff. Patient said genesisewell to the writer as he may not see her tomorrow because he is being discharged. The writer said hannah and that she hopes everything works out for him, be safe and keep his head up. The patient saluted and said thank you. The writer will continue to monitor the patient throughout the remainder of the shift to ensure patient's safety.

## 2020-04-02 NOTE — GROUP NOTE
JUANIS  GROUP DOCUMENTATION INDIVIDUAL Group Therapy Note Date: 4/1/2020 Group Start Time: 1900 Group End Time: 1930 Group Topic: Nursing [facility] ADULT CHEM Cari Mccall RN IP  GROUP DOCUMENTATION GROUP Group Therapy Note Attendees: 4 Attendance: Attended Patient's Goal:  Safety/virus protection Interventions/techniques: Informed Follows Directions: Followed directions Interactions: Interacted appropriately Mental Status: Calm Behavior/appearance: Cooperative Goals Achieved: Able to engage in interactions Additional Notes:   
 
Yung Roa RN

## 2020-04-06 NOTE — DISCHARGE SUMMARY
DR. [facility]AE[facility] \[facility]\""  Inpatient Psychiatry   Discharge Summary     Admit date: 3/28/2020    Discharge date and time: 4/2/2020 11:15 AM    Discharge Physician: Alban Patton MD    DISCHARGE DIAGNOSES     Psychiatric Diagnoses:   Patient Active Problem List   Diagnosis Code    Cocaine use disorder, severe, dependence ([facility] [address] F14.20    Depressive disorder F32.9    Strep pharyngitis J02.0       Level of impairment/disability: mild    HOSPITAL COURSE   Vivi Jasso is a 41 y.o. BLACK OR  male with a history of bipolar disorder and cocaine use disorder, severe who was admitted voluntarily from our ED for suicidal ideation with no definite plan. According to the crisis nurse, patient kept on insisting that he was suicidal and did not feel safe out of the confines of the hospital.  He reported that he had recently been bonded out of alf yesterday and decided to come to the ED to seek help with his suicidal ideations instead of acting on them. He informed the nurse that he took a large amount of cocaine yesterday to harm himself. Patient is a fair historian. He expresses depressed mood and frustration with his cocaine addiction. He states \"I am here for the long haul and I am tired of using drugs. \"  He has had multiple admissions to this facility and usually request to be discharged after 2 days. Patient states he is here for the long haul because he wants to be transferred to a long-term rehab center and he is tired of using cocaine. He has been using cocaine for at least 10 years. He uses daily as much as he can get. He also uses marijuana occasionally. Patient says that his cocaine addiction has ruined his life. He has lost his relationship with his wife and other family members, he is unable to keep a job or have any finances, he is homeless and has legal issues. He has been to assisted multiple times for theft and currently is on probation.   Patient says he has lost everything and he is just at the point where he knows he needs to make a change but needs help. He is unable to tell me of any significant period of sobriety out of custodial. He says his longest period of sobriety was 4 years during which he was incarcerated. He has been to rehab once with the [facility] in 2011. Patient also admits to using marijuana occasionally but says it is not his drug of choice. He denies any alcohol use. He smokes 1 pack of cigarettes daily.     Patient has a diagnosis of bipolar disorder but only symptoms he can report are mood swings and difficulty with concentration. It is all also difficult to confirm this diagnosis given that he has not had any extensive periods of sobriety during which manic or  hypomanic symptoms were observed or documented. Patient at this time reports good appetite but weight loss of at least 20 pounds in the past 3 months due to cocaine use. He states he has been sleeping fairly well but his concentration is poor and he believes he has problems with his memory.     Patient tested positive for strep throat in the ED and tested negative for the flu. Hospital course: Patient admitted and monitored for suicidal ideation. Once he was admitted to the unit, patient denied suicidal ideations and stated that he had just said he was suicidal in order to be able to get hospitalized and get help for his addiction. He did not present with any withdrawal symptoms. He was started on Wellbutrin  mg daily for mood which he tolerated well. His goal was to go to long-term rehab facility but no facilities that took his insurance had beds available. He was given information to call and follow-up during the week in order to find out when a bed becomes available. On day of discharge, patient requested to be discharged to his sister's house. He said he had a friend with whom they would be doing some home repairs and that we will keep him occupied.   No SI, HI, psychotic symptoms or manic symptoms. DISPOSITION/FOLLOW-UP     Disposition: Sister's house    Follow-up Appointments: Follow-up Information     Follow up With Specialties Details Why Contact Info    None    None ([phone]) Patient stated that they have no PCP          Patient has been referred to the [facility] for [address]   [address],  [place], [place]  [phone]          Patient has also been referred to [facility] in [place] and admission is pending until bed is available\  Patient is to contact placement on Wednesday for possible admission   Meenakshi Guerin [phone]          Patient can also follow up with by appointment made by patient  due to Stuart Santos [address], [address] [place] [address]  [phone]            MEDICATION CHANGES   Outpatient medications:  No current facility-administered medications on file prior to encounter. No current outpatient medications on file prior to encounter. Discharged medication:  Discharge Medication List as of 4/3/2020  2:08 PM      START taking these medications    Details   buPROPion XL (WELLBUTRIN XL) 150 mg tablet Take 1 Tab by mouth daily (after breakfast). Indications: Mood disorder, Print, Disp-30 Tab, R-0      penicillin v potassium (VEETID) 500 mg tablet Take 1 Tab by mouth two (2) times a day for 7 days. Indications: throat irritation, Print, Disp-14 Tab, R-0         STOP taking these medications       divalproex DR (DEPAKOTE) 500 mg tablet Comments:   Reason for Stopping:         cariprazine (VRAYLAR) 1.5 mg capsule Comments:   Reason for Stopping:               Instructions, risks (black box warning), benefits and side effects (EPS, TD, NMS) were discussed in detail prior to discharge. Patient denied any adverse medication side effects prior to discharge.        LABS/IMAGING DURING ADMISSION     Results for orders placed or performed during the hospital encounter of 03/28/20   INFLUENZA A & B AG (RAPID TEST)   Result Value Ref Range    Influenza A Antigen NEGATIVE  NEG      Influenza B Antigen NEGATIVE  NEG     STREP AG SCREEN, GROUP A   Result Value Ref Range    Group A Strep Ag ID POSITIVE     DRUG SCREEN, URINE   Result Value Ref Range    BENZODIAZEPINES NEGATIVE  NEG      BARBITURATES NEGATIVE  NEG      THC (TH-CANNABINOL) POSITIVE (A) NEG      OPIATES NEGATIVE  NEG      PCP(PHENCYCLIDINE) NEGATIVE  NEG      COCAINE POSITIVE (A) NEG      AMPHETAMINES NEGATIVE  NEG      METHADONE NEGATIVE  NEG      HDSCOM (NOTE)    CBC WITH AUTOMATED DIFF   Result Value Ref Range    WBC 12.2 4.6 - 13.2 K/uL    RBC 5.43 4.70 - 5.50 M/uL    HGB 16.4 (H) 13.0 - 16.0 g/dL    HCT 49.1 (H) 36.0 - 48.0 %    MCV 90.4 74.0 - 97.0 FL    MCH 30.2 24.0 - 34.0 PG    MCHC 33.4 31.0 - 37.0 g/dL    RDW 13.6 11.6 - 14.5 %    PLATELET 505 468 - 894 K/uL    MPV 10.0 9.2 - 11.8 FL    NEUTROPHILS 81 (H) 40 - 73 %    LYMPHOCYTES 11 (L) 21 - 52 %    MONOCYTES 6 3 - 10 %    EOSINOPHILS 2 0 - 5 %    BASOPHILS 0 0 - 2 %    ABS. NEUTROPHILS 9.8 (H) 1.8 - 8.0 K/UL    ABS. LYMPHOCYTES 1.4 0.9 - 3.6 K/UL    ABS. MONOCYTES 0.8 0.05 - 1.2 K/UL    ABS. EOSINOPHILS 0.2 0.0 - 0.4 K/UL    ABS.  BASOPHILS 0.0 0.0 - 0.1 K/UL    DF AUTOMATED     METABOLIC PANEL, BASIC   Result Value Ref Range    Sodium 143 136 - 145 mmol/L    Potassium 3.9 3.5 - 5.5 mmol/L    Chloride 107 100 - 111 mmol/L    CO2 34 (H) 21 - 32 mmol/L    Anion gap 2 (L) 3.0 - 18 mmol/L    Glucose 61 (L) 74 - 99 mg/dL    BUN 10 7.0 - 18 MG/DL    Creatinine 1.27 0.6 - 1.3 MG/DL    BUN/Creatinine ratio 8 (L) 12 - 20      GFR est AA >60 >60 ml/min/1.73m2    GFR est non-AA >60 >60 ml/min/1.73m2    Calcium 8.8 8.5 - 10.1 MG/DL   ETHYL ALCOHOL   Result Value Ref Range    ALCOHOL(ETHYL),SERUM <3 0 - 3 MG/DL   URINALYSIS W/ RFLX MICROSCOPIC   Result Value Ref Range    Color DARK YELLOW      Appearance CLEAR      Specific gravity 1.025 1.005 - 1.030      pH (UA) 7.0 5.0 - 8.0      Protein NEGATIVE  NEG mg/dL    Glucose NEGATIVE  NEG mg/dL    Ketone TRACE (A) NEG mg/dL    Bilirubin NEGATIVE  NEG      Blood NEGATIVE  NEG      Urobilinogen 4.0 (H) 0.2 - 1.0 EU/dL    Nitrites NEGATIVE  NEG      Leukocyte Esterase SMALL (A) NEG     URINE MICROSCOPIC ONLY   Result Value Ref Range    WBC 15 to 20 0 - 4 /hpf    RBC NONE 0 - 5 /hpf    Epithelial cells FEW 0 - 5 /lpf    Bacteria FEW (A) NEG /hpf   CHLAMYDIA/NEISSERIA AMPLIFICATION   Result Value Ref Range    Chlamydia amplification POSITIVE (A) NEG      N. gonorrhoeae amplification POSITIVE (A) NEG      Specimen Source URINE     T VAGINALIS AMPLIFICATION   Result Value Ref Range    Source URINE      T. vaginalis by KYLAH NEGATIVE  NEGATIVE          DISCHARGE MENTAL STATUS EVALUATION     Appearance/Hygiene 41 y.o. BLACK OR  male  Hygiene: good   Attitude/Behavior/Social Relatedness Appropriate   Musculoskeletal Gait/Station: appropriate  Tone (flaccid, cogwheeling, spastic): not assessed  Psychomotor (hyperkinetic, hypokinetic): calm  Involuntary movements (tics, dyskinesias, akathisa, stereotypies): none   Speech   Rate, rhythm, volume, fluency and articulation are appropriate   Mood   euthymic   Affect    congruent   Thought Process Linear and goal directed   Thought Content and Perceptual Disturbances Denies self-injurious behavior (SIB), suicidal ideation (SI), aggressive behavior or homicidal ideation (HI)    Denies auditory and visual hallucinations   Sensorium and Cognition  Grossly intact   Insight  fair   Judgment fair       SUICIDE RISK ASSESSMENT     [] Admission  [x] Discharge     Key Factors:   Current admission precipitated by suicide attempt?   []  Yes     2    [x]  No     1     Suicide Attempt History  [] Past attempts of high lethality    2 []  Past attempts of low lethality    1 [x]  No previous attempts       0   Suicidal Ideation []  Constant suicidal thoughts      2 []  Intermittent or fleeting suicidal  thoughts  1 [x]  Denies current suicidal thoughts    0   Suicide Plan   []  Has plan with actual OR potential access to planned method    2 []  Has plan without access to planned method      1 [x]  No plan            0   Plan Lethality []  Highly lethal plan (Carbon monoxide, gun, hanging, jumping)    2 []  Moderate lethality of plan          1 []  Low lethality of plan (biting, head banging, superficial scratching, pillow over face)  0   Safety Plan Agreement  []  Unwilling OR unable to agree due to impaired reality testing   2   []  Patient is ambivalent and/or guarded      1 [x]  Reliably agrees        0   Current Morbid Thoughts (reunion fantasies, preoccupations with death) []  Constantly     2     []  Frequently    1 [x]  Rarely    0   Elopement Risk  []  High risk     2 []  Moderate risk    1 [x]   Low risk    0   Symptoms    []  Hopeless  []  Helpless  []  Anhedonia   []  Guilt/shame  []  Anger/rage  []  Anxiety  []  Insomnia   []  Agitation   []  Impulsivity  []  5-6 symptoms present    2 []  3-4 symptoms present    1  [x]  0-2 symptoms present    0     Scoring Key:  10 or higher = Imminent Risk (consider 1:1)  4 - 9 = Moderate Risk (consider q 15 minute observation)Attended alcohol, tobacco, prescription and other drug psychoeducation group.   0 - 3 = Low Risk (consider q 30 minute observation)    Total Score: 1  ------------------------------------------------------------------------------------------------------------------  PLEASE ADDRESS THE FOLLOWING 5 ISSUES     Physician's Subjective Appraisal of Risk (check one):  []  Patient replies not trustworthy: several non-verbal cues. []  Patient replies questionable: trustworthy: at least 1 non-verbal cue. [x]  Patient replies appear trustworthy. Family History of Suicide?    []  Yes  [x]  No    Protective measures (select all that apply):  [x]  Successful past responses to stress  []  Spiritual/Congregational beliefs  [x]  Capacity for reality testing  []  Positive therapeutic relationships  [x]  Social supports/connections  []  Positive coping skills  []  Frustration tolerance/optimism  []  Children or pets in the home  []  Sense of responsibility to family  [x]  Agrees to treatment plan and follow up    Others (list):    High Risk Diagnoses (select all that apply):  [x]  Depression/Bipolar Disorder  [x]  Dual Diagnosis  []  Cardiovascular Disease  []  Schizophrenia  []  Chronic Pain  []  Epilepsy  []  Cancer  []  Personality Disorder  []  HIV/AIDS  []  Multiple Sclerosis    Dangerousness Assessment (Suicide, homicide, property destruction. ..)    Risk Factors reviewed and risk assessed to be:  [] low  [x] low-moderate  [] moderate   [] moderate-high  [] high     Protection factors reviewed and risk assessed to be:  [x] low  [] low-moderate  [] moderate   [] moderate-high  [] high     Response to treatment and risk assessed to be:  [x] low  [] low-moderate  [] moderate   [] moderate-high  [] high     Support reviewed and risk assessed to be:  [x] low  [] low-moderate  [] moderate   [] moderate-high  [] high     Acceptance of Discharge and outpatient treatment reviewed and risk assessed to be:    [x] low  [] low-moderate  [] moderate   [] moderate-high  [] high   Overall risk assessed to be:  [x] low  [] low-moderate  [] moderate   [] moderate-high  [] high     Completion of discharge was greater than 30 minutes. Over 50% of today's discharge was geared towards counseling and coordination of care.           Alissa Chance MD  Psychiatry  [facility]

## 2020-04-19 ENCOUNTER — HOSPITAL ENCOUNTER (INPATIENT)
Age: 40
LOS: 3 days | Discharge: HOME | End: 2020-04-22
Attending: EMERGENCY MEDICINE | Admitting: PSYCHIATRY & NEUROLOGY
Payer: MEDICAID

## 2020-04-19 DIAGNOSIS — F32.A DEPRESSION, UNSPECIFIED DEPRESSION TYPE: Primary | ICD-10-CM

## 2020-04-19 DIAGNOSIS — F14.10 COCAINE ABUSE (HCC): ICD-10-CM

## 2020-04-19 DIAGNOSIS — R45.851 SUICIDAL IDEATION: ICD-10-CM

## 2020-04-19 LAB
AMPHET UR QL SCN: NEGATIVE
ANION GAP SERPL CALC-SCNC: 5 MMOL/L (ref 3–18)
BARBITURATES UR QL SCN: NEGATIVE
BASOPHILS # BLD: 0 K/UL (ref 0–0.1)
BASOPHILS NFR BLD: 0 % (ref 0–2)
BENZODIAZ UR QL: NEGATIVE
BUN SERPL-MCNC: 14 MG/DL (ref 7–18)
BUN/CREAT SERPL: 12 (ref 12–20)
CALCIUM SERPL-MCNC: 8.3 MG/DL (ref 8.5–10.1)
CANNABINOIDS UR QL SCN: POSITIVE
CHLORIDE SERPL-SCNC: 110 MMOL/L (ref 100–111)
CO2 SERPL-SCNC: 29 MMOL/L (ref 21–32)
COCAINE UR QL SCN: POSITIVE
CREAT SERPL-MCNC: 1.2 MG/DL (ref 0.6–1.3)
DIFFERENTIAL METHOD BLD: ABNORMAL
EOSINOPHIL # BLD: 0.6 K/UL (ref 0–0.4)
EOSINOPHIL NFR BLD: 9 % (ref 0–5)
ERYTHROCYTE [DISTWIDTH] IN BLOOD BY AUTOMATED COUNT: 13.2 % (ref 11.6–14.5)
ETHANOL SERPL-MCNC: <3 MG/DL (ref 0–3)
GLUCOSE SERPL-MCNC: 123 MG/DL (ref 74–99)
HCT VFR BLD AUTO: 43.8 % (ref 36–48)
HDSCOM,HDSCOM: ABNORMAL
HGB BLD-MCNC: 15 G/DL (ref 13–16)
LYMPHOCYTES # BLD: 2.3 K/UL (ref 0.9–3.6)
LYMPHOCYTES NFR BLD: 33 % (ref 21–52)
MCH RBC QN AUTO: 30.1 PG (ref 24–34)
MCHC RBC AUTO-ENTMCNC: 34.2 G/DL (ref 31–37)
MCV RBC AUTO: 87.8 FL (ref 74–97)
METHADONE UR QL: NEGATIVE
MONOCYTES # BLD: 0.4 K/UL (ref 0.05–1.2)
MONOCYTES NFR BLD: 6 % (ref 3–10)
NEUTS SEG # BLD: 3.5 K/UL (ref 1.8–8)
NEUTS SEG NFR BLD: 52 % (ref 40–73)
OPIATES UR QL: NEGATIVE
PCP UR QL: NEGATIVE
PLATELET # BLD AUTO: 262 K/UL (ref 135–420)
PMV BLD AUTO: 9.7 FL (ref 9.2–11.8)
POTASSIUM SERPL-SCNC: 3.7 MMOL/L (ref 3.5–5.5)
RBC # BLD AUTO: 4.99 M/UL (ref 4.7–5.5)
SODIUM SERPL-SCNC: 144 MMOL/L (ref 136–145)
WBC # BLD AUTO: 6.8 K/UL (ref 4.6–13.2)

## 2020-04-19 PROCEDURE — 80307 DRUG TEST PRSMV CHEM ANLYZR: CPT

## 2020-04-19 PROCEDURE — 85025 COMPLETE CBC W/AUTO DIFF WBC: CPT

## 2020-04-19 PROCEDURE — 99283 EMERGENCY DEPT VISIT LOW MDM: CPT

## 2020-04-19 PROCEDURE — 65220000003 HC RM SEMIPRIVATE PSYCH

## 2020-04-19 PROCEDURE — 80048 BASIC METABOLIC PNL TOTAL CA: CPT

## 2020-04-19 PROCEDURE — 74011250637 HC RX REV CODE- 250/637: Performed by: PSYCHIATRY & NEUROLOGY

## 2020-04-19 RX ORDER — HALOPERIDOL 5 MG/ML
5 INJECTION INTRAMUSCULAR
Status: DISCONTINUED | OUTPATIENT
Start: 2020-04-19 | End: 2020-04-22 | Stop reason: HOSPADM

## 2020-04-19 RX ORDER — TRAZODONE HYDROCHLORIDE 50 MG/1
50 TABLET ORAL
Status: DISCONTINUED | OUTPATIENT
Start: 2020-04-19 | End: 2020-04-22 | Stop reason: HOSPADM

## 2020-04-19 RX ORDER — HYDROXYZINE PAMOATE 50 MG/1
50 CAPSULE ORAL
Status: DISCONTINUED | OUTPATIENT
Start: 2020-04-19 | End: 2020-04-22 | Stop reason: HOSPADM

## 2020-04-19 RX ORDER — IBUPROFEN 600 MG/1
600 TABLET ORAL
Status: DISCONTINUED | OUTPATIENT
Start: 2020-04-19 | End: 2020-04-22 | Stop reason: HOSPADM

## 2020-04-19 RX ORDER — HALOPERIDOL 5 MG/1
5 TABLET ORAL
Status: DISCONTINUED | OUTPATIENT
Start: 2020-04-19 | End: 2020-04-22 | Stop reason: HOSPADM

## 2020-04-19 RX ADMIN — IBUPROFEN 600 MG: 600 TABLET, FILM COATED ORAL at 21:12

## 2020-04-19 RX ADMIN — TRAZODONE HYDROCHLORIDE 50 MG: 50 TABLET ORAL at 21:12

## 2020-04-19 NOTE — BSMART NOTE
42 yo M seen in ED room 15 at the request of RICARDO Moss. Known to this writer from multiple intake interviews and 6 previous admissions to [email or web address] AND [facility] in past year. Most recent discharge 4/6/20. States with everything shutdown due to COVID-19, he was not able to complete his planned follow-up for long-term rehab. Unemployed and homeless. CC: Suicidal ideation, increased depression, crack cocaine relapse States he became very hopeless and relapsed with crack cocaine using every day he could get it. States he is devastated by his losses of family and a decent life. States he has still been trying and has a lead with [facility], but has become overwhelmed. Reports suicidal thoughts \"all the time now. \" denies homicidal ideation. Denies A / V hallucinations. Admits to marijuana use, denies any recent alcohol use. BAL=0, UDSC + THC & Cocaine. Reports depression, not regular with antidepressant (Wellbutrin) Hx of incarceration, denies current legal charges. Client has a hx of exaggerating depressed symptoms & suicidality when coming off of binge cocaine use. Currently is unable or unwilling to contract for safety and believes he will kill himself on the streets in such a hopeless state as he is now. DISPOSITION: Discussed with RICARDO Moss.  contacted and admission orders received. Report to Faisal Rhodes RN on ADCD unit.

## 2020-04-19 NOTE — ED TRIAGE NOTES
Pt is A/O x 4 with a history of crack and cocaine abuse. Last used drugs last night and concerned with harming himself.

## 2020-04-19 NOTE — ED PROVIDER NOTES
EMERGENCY DEPARTMENT HISTORY AND PHYSICAL EXAM    12:39 PM      Date: 4/19/2020  Patient Name: Jazmín Erickson    History of Presenting Illness     Chief Complaint   Patient presents with   [address] Problem         History Provided By: Patient    Additional History (Context): Jazmín Erickson is a 41 y.o. male with hx of asthma, GERD, bipolar d/o, cocaine use who presents with complaint of drug abuse and suicidal ideation. Patient notes last use of crack cocaine was last night. Patient notes history of abuse in the past, has been trying to get admitted to a rehab center. Pt denies visual or auditory hallucinations, homicidal ideation, alcohol use, suicidal plan. PCP: None    Current Outpatient Medications   Medication Sig Dispense Refill    buPROPion XL (WELLBUTRIN XL) 150 mg tablet Take 1 Tab by mouth daily (after breakfast). Indications: Mood disorder 30 Tab 0       Past History     Past Medical History:  Past Medical History:   Diagnosis Date    Acid reflux     Asthma     Bipolar disorder, unspecified ([facility] [address] 3/1/2019    Cocaine use disorder, severe, dependence ([facility] [address]        Past Surgical History:  History reviewed. No pertinent surgical history. Family History:  History reviewed. No pertinent family history. Social History:  Social History     Tobacco Use    Smoking status: Current Every Day Smoker     Packs/day: 1.00    Smokeless tobacco: Never Used   Substance Use Topics    Alcohol use: Not Currently    Drug use: Yes     Types: Cocaine       Allergies:  No Known Allergies      Review of Systems       Review of Systems   Constitutional: Negative for chills and fever. Respiratory: Negative for shortness of breath. Cardiovascular: Negative for chest pain. Gastrointestinal: Negative for abdominal pain, nausea and vomiting. Skin: Negative for rash. Neurological: Negative for weakness. Psychiatric/Behavioral: Positive for dysphoric mood and suicidal ideas.    All other systems reviewed and are negative. Physical Exam     Visit Vitals  /78 (BP 1 Location: Left arm, BP Patient Position: At rest)   Pulse 75   Temp 97.6 °F (36.4 °C)   Resp 16   Ht 6' 2\" (1.88 m)   Wt 85.7 kg (189 lb)   SpO2 98%   BMI 24.27 kg/m²         Physical Exam  Vitals signs and nursing note reviewed. Constitutional:       General: He is not in acute distress. Appearance: Normal appearance. He is well-developed. He is not diaphoretic. HENT:      Head: Normocephalic and atraumatic. Neck:      Musculoskeletal: Normal range of motion and neck supple. Cardiovascular:      Rate and Rhythm: Normal rate and regular rhythm. Heart sounds: Normal heart sounds. No murmur. No friction rub. No gallop. Pulmonary:      Effort: Pulmonary effort is normal. No respiratory distress. Breath sounds: Normal breath sounds. No wheezing or rales. Musculoskeletal: Normal range of motion. Skin:     General: Skin is warm. Findings: No rash. Neurological:      Mental Status: He is alert. Psychiatric:         Attention and Perception: Attention normal.         Mood and Affect: Mood normal.         Speech: Speech normal.         Behavior: Behavior normal.         Thought Content: Thought content includes suicidal ideation. Thought content does not include homicidal ideation. Thought content does not include homicidal or suicidal plan.            Diagnostic Study Results     Labs -  Recent Results (from the past 12 hour(s))   DRUG SCREEN, URINE    Collection Time: 04/19/20 12:41 PM   Result Value Ref Range    BENZODIAZEPINES Negative NEG      BARBITURATES Negative NEG      THC (TH-CANNABINOL) Positive (A) NEG      OPIATES Negative NEG      PCP(PHENCYCLIDINE) Negative NEG      COCAINE Positive (A) NEG      AMPHETAMINES Negative NEG      METHADONE Negative NEG      HDSCOM (NOTE)    ETHYL ALCOHOL    Collection Time: 04/19/20 12:46 PM   Result Value Ref Range    ALCOHOL(ETHYL),SERUM <3 0 - 3 MG/DL   CBC WITH AUTOMATED DIFF    Collection Time: 04/19/20 12:46 PM   Result Value Ref Range    WBC 6.8 4.6 - 13.2 K/uL    RBC 4.99 4.70 - 5.50 M/uL    HGB 15.0 13.0 - 16.0 g/dL    HCT 43.8 36.0 - 48.0 %    MCV 87.8 74.0 - 97.0 FL    MCH 30.1 24.0 - 34.0 PG    MCHC 34.2 31.0 - 37.0 g/dL    RDW 13.2 11.6 - 14.5 %    PLATELET 247 862 - 405 K/uL    MPV 9.7 9.2 - 11.8 FL    NEUTROPHILS 52 40 - 73 %    LYMPHOCYTES 33 21 - 52 %    MONOCYTES 6 3 - 10 %    EOSINOPHILS 9 (H) 0 - 5 %    BASOPHILS 0 0 - 2 %    ABS. NEUTROPHILS 3.5 1.8 - 8.0 K/UL    ABS. LYMPHOCYTES 2.3 0.9 - 3.6 K/UL    ABS. MONOCYTES 0.4 0.05 - 1.2 K/UL    ABS. EOSINOPHILS 0.6 (H) 0.0 - 0.4 K/UL    ABS. BASOPHILS 0.0 0.0 - 0.1 K/UL    DF AUTOMATED     METABOLIC PANEL, BASIC    Collection Time: 04/19/20 12:46 PM   Result Value Ref Range    Sodium 144 136 - 145 mmol/L    Potassium 3.7 3.5 - 5.5 mmol/L    Chloride 110 100 - 111 mmol/L    CO2 29 21 - 32 mmol/L    Anion gap 5 3.0 - 18 mmol/L    Glucose 123 (H) 74 - 99 mg/dL    BUN 14 7.0 - 18 MG/DL    Creatinine 1.20 0.6 - 1.3 MG/DL    BUN/Creatinine ratio 12 12 - 20      GFR est AA >60 >60 ml/min/1.73m2    GFR est non-AA >60 >60 ml/min/1.73m2    Calcium 8.3 (L) 8.5 - 10.1 MG/DL       Radiologic Studies -   No orders to display         Medical Decision Making   I am the first provider for this patient. I reviewed the vital signs, available nursing notes, past medical history, past surgical history, family history and social history. Vital Signs-Reviewed the patient's vital signs. Records Reviewed: Nursing Notes and Old Medical Records (Time of Review: 12:39 PM)    ED Course: Progress Notes, Reevaluation, and Consults:  12:39 PM: Page placed to crisis. 4:40 PM: Discussed case with rebecca Abdi. Pt will be admitted to [address] [place] [address] Dr behavioral unit. Provider Notes (Medical Decision Making): 42-year-old male with history of bipolar disorder, cocaine abuse who presents to the ED due to drug abuse and suicidal ideation. Medically screened in the ED. Crisis evaluated patient. Will be admitted voluntarily to behavioral unit for further assessment. Diagnosis     Clinical Impression:   1. Depression, unspecified depression type    2. Cocaine abuse ([facility] [address]    3. Suicidal ideation        Disposition: admission     Follow-up Information    None          Patient's Medications   Start Taking    No medications on file   Continue Taking    BUPROPION XL (WELLBUTRIN XL) 150 MG TABLET    Take 1 Tab by mouth daily (after breakfast). Indications: Mood disorder   These Medications have changed    No medications on file   Stop Taking    No medications on file       Dictation disclaimer:  Please note that this dictation was completed with DragonRAD, the computer voice recognition software. Quite often unanticipated grammatical, syntax, homophones, and other interpretive errors are inadvertently transcribed by the computer software. Please disregard these errors. Please excuse any errors that have escaped final proofreading.

## 2020-04-20 PROBLEM — J02.0 STREP PHARYNGITIS: Status: RESOLVED | Noted: 2020-03-29 | Resolved: 2020-04-20

## 2020-04-20 PROBLEM — F32.A DEPRESSION: Status: RESOLVED | Noted: 2020-04-19 | Resolved: 2020-04-20

## 2020-04-20 PROCEDURE — 65220000003 HC RM SEMIPRIVATE PSYCH

## 2020-04-20 PROCEDURE — 74011250637 HC RX REV CODE- 250/637: Performed by: PSYCHIATRY & NEUROLOGY

## 2020-04-20 RX ORDER — BUPROPION HYDROCHLORIDE 150 MG/1
150 TABLET ORAL DAILY
Status: DISCONTINUED | OUTPATIENT
Start: 2020-04-20 | End: 2020-04-22 | Stop reason: HOSPADM

## 2020-04-20 RX ADMIN — BUPROPION HYDROCHLORIDE 150 MG: 150 TABLET, FILM COATED, EXTENDED RELEASE ORAL at 12:46

## 2020-04-20 RX ADMIN — TRAZODONE HYDROCHLORIDE 50 MG: 50 TABLET ORAL at 20:42

## 2020-04-20 NOTE — H&P
History and Physical        Patient: Melissa Ryan               Sex: male          DOA: 4/19/2020         YOB: 1980      Age:  41 y.o.        LOS:  LOS: 1 day        HPI:     Melissa Ryan is a 41 y.o. AA  male who was admitted experiencing depression   and suicidal ideation. Patient is also cocaine dependent and has been admitted   here several times. Patient was minimally cooperative. Principal Problem:    Depressive disorder (3/29/2020)    Active Problems:    Cocaine use disorder, severe, dependence ([facility] (10/8/2019)        Past Medical History:   Diagnosis Date    Acid reflux     Asthma     Bipolar disorder, unspecified ([facility] 3/1/2019    Cocaine use disorder, severe, dependence ([facility]        History reviewed. No pertinent surgical history. History reviewed. No pertinent family history. Social History     Socioeconomic History    Marital status:      Spouse name: Not on file    Number of children: Not on file    Years of education: Not on file    Highest education level: Not on file   Tobacco Use    Smoking status: Current Every Day Smoker     Packs/day: 1.00    Smokeless tobacco: Never Used   Substance and Sexual Activity    Alcohol use: Not Currently    Drug use: Yes     Types: Cocaine    Sexual activity: Not Currently       Prior to Admission medications    Medication Sig Start Date End Date Taking? Authorizing Provider   buPROPion XL (WELLBUTRIN XL) 150 mg tablet Take 1 Tab by mouth daily (after breakfast). Indications: Mood disorder 4/3/20   Shahnaz Posada MD       No Known Allergies    Review of Systems  A comprehensive review of systems was negative except for that written in the History of Present Illness.       Physical Exam:      Visit Vitals  /78 (BP 1 Location: Right arm, BP Patient Position: Sitting)   Pulse 62   Temp 97.6 °F (36.4 °C)   Resp 16   Ht 6' 2\" (1.88 m)   Wt 86.2 kg (190 lb)   SpO2 98%   BMI 24.39 kg/m²       Physical Exam:  Physical Exam:   General:  Alert, minimally cooperative, no distress, appears stated age. Eyes:  Conjunctivae/corneas clear. PERRL, EOMs intact. Fundi benign   Ears:  Normal TMs and external ear canals both ears. Nose: Nares normal. Septum midline. Mucosa normal. No drainage or sinus tenderness. Mouth/Throat: Lips, mucosa, and tongue normal. Teeth poor dentition and gums normal.   Neck: Supple, symmetrical, trachea midline, no adenopathy, thyroid: no enlargement/tenderness/nodules, no carotid bruit and no JVD. Back:   Symmetric, no curvature. ROM normal. No CVA tenderness. Lungs:   Clear to auscultation bilaterally. Heart:  Regular rate and rhythm, S1, S2 normal, no murmur, click, rub or gallop. Abdomen:   Soft, non-tender. Bowel sounds normal. No masses,  No organomegaly. Extremities: Extremities normal, atraumatic, no cyanosis or edema. Left foot TTP. States he hurt it a long time ago. Has not seen a podiatrist. That was suggested. Pulses: 2+ and symmetric all extremities. Skin: Skin color, texture, turgor normal. No rashes or lesions   Lymph nodes: Cervical, supraclavicular, and axillary nodes normal.   Neurologic: CNII-XII intact. Normal strength, sensation and reflexes throughout. Assessment/Plan     Patient was minimally cooperative had a lot to say about not being able to wash and dry his  clothes here. Plan is for patient to participate in unit activities, take medications as ordered and   Follow all physician;s in and out patient orders.
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
## 2020-04-20 NOTE — BH NOTES
Pt in day area for meals and to see doctor. He tends to be somewhat  needy but declines to participate in groups gets up and goes to room. Is asking again for placement . Encouraged to participate in groups and activities. Focus on what he needs to work on for his treatment.

## 2020-04-20 NOTE — H&P
[address] [facility] [address]  Inpatient Admission Note    Date of Service:  04/20/20    Historian(s): Irma Gaines and chart review  Referral Source: [facility]    Chief Complaint   Suicidal ideation    History of Present Illness     Irma Gaines is a 41 y.o. BLACK OR  male with a history of severe cocaine use disorder and unspecified depressive disorder who was admitted voluntarily from our ED for suicidal ideation with no plan. Patient is well-known to this facility and has had multiple hospitalizations here. He was just discharged from here at the beginning of this month. Patient is homeless and appears to get some secondary gain from being in the hospital.  When he was discharged on April 2, [facility]. Patient states he went to both places and also tried to get into [facility] but all the places were full and nobody accepted him. He says he had nowhere to go and eventually relapsed on cocaine. He is feeling helpless and hopeless and was having suicidal ideations yesterday so decided to come back to the hospital.  He also has not been  taking Wellbutrin consistently as prescribed. His UDS was positive for marijuana and cocaine. He denies use of alcohol. He is very vague with providing details on amount and frequency of use. Today he says he needs help with getting an ID as he is unable to get into shelters without an ID. He is also hoping to get his stimulus checked but will not be able to catch it or open the bank account without an ID. He wants to have computer access so he could apply for an ID on line. Psychiatric Treatment History   Patient has had numerous hospitalizations to this facility for suicidal ideation although he has actually never had a suicide attempt.   He has a long history of treatment noncompliance and tends not to go for outpatient follow-up appointments. He is supposed to follow-up with the [facility] but has actually never done so. He has been treated in the past with Depakote, Remeron and olanzapine. During 1 of his hospitalizations he initially endorsed auditory hallucinations but at the time he was discharged, he stated he never did have auditory hallucinations and just reported that symptom in order to be hospitalized. So far, the undersigned has not observed symptoms of wilbert, psychosis or significant depression in this patient. Allergies    No Known Allergies    Medical History     Past Medical History:   Diagnosis Date    Acid reflux     Asthma     Bipolar disorder, unspecified ([facility] 3/1/2019    Cocaine use disorder, severe, dependence ([facility]          Medication(s)     Prior to Admission Medications   Prescriptions Last Dose Informant Patient Reported? Taking? buPROPion XL (WELLBUTRIN XL) 150 mg tablet Unknown at Unknown time  No No   Sig: Take 1 Tab by mouth daily (after breakfast). Indications: Mood disorder      Facility-Administered Medications: None         Substance Abuse History     Patient has a long history of cocaine use disorder. He has been using cocaine for at least 10 years. He uses daily as much as he can get. He also uses marijuana occasionally. Patient says that his cocaine addiction has ruined his life. He has lost his relationship with his wife and other family members, he is unable to keep a job or have any finances, he is homeless and has legal issues. He has been to [facility] multiple times for theft and currently is on probation. He says his longest period of sobriety was 4 years during which he was incarcerated. He has been to rehab once with the [facility] in 2011. Family History     History reviewed. No pertinent family history. Psychiatric Family History  Parents with a history of cocaine use when the patient was younger. He says they are now sober. Family history of suicide? no    Social History   Patient is currently homeless. He is  but  from his wife. He has 2 daughters who are 21years old with 2 different women. Patient says he went as far as the 10th grade but had a learning disability and had to take \"Resource LD classes\". .  He says they kept promoting him from 1 class to the next without him actually mastering the material.  He reports that he has not had any significant period of employment. 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 He states he has been in [employer] at least 5-7 times in the past.  Longest sentence was 4.5 years and all the sentences were due to grand larceny. He is currently on probation.       Vitals/Labs      Vitals:    04/19/20 1236 04/19/20 1828 04/19/20 2030 04/20/20 0842   BP: 134/78 133/74 116/72 126/78   Pulse: 75 83 82 62   Resp: 16 16 16 16   Temp: 97.6 °F (36.4 °C) 97.7 °F (36.5 °C) 97.6 °F (36.4 °C) 97.6 °F (36.4 °C)   SpO2: 98%      Weight: 85.7 kg (189 lb) 86.2 kg (190 lb)     Height: 6' 2\" (1.88 m) 6' 2\" (1.88 m)         Labs:   Results for orders placed or performed during the hospital encounter of 04/19/20   DRUG SCREEN, URINE   Result Value Ref Range    BENZODIAZEPINES Negative NEG      BARBITURATES Negative NEG      THC (TH-CANNABINOL) Positive (A) NEG      OPIATES Negative NEG      PCP(PHENCYCLIDINE) Negative NEG      COCAINE Positive (A) NEG      AMPHETAMINES Negative NEG      METHADONE Negative NEG      HDSCOM (NOTE)    ETHYL ALCOHOL   Result Value Ref Range    ALCOHOL(ETHYL),SERUM <3 0 - 3 MG/DL   CBC WITH AUTOMATED DIFF   Result Value Ref Range    WBC 6.8 4.6 - 13.2 K/uL    RBC 4.99 4.70 - 5.50 M/uL    HGB 15.0 13.0 - 16.0 g/dL    HCT 43.8 36.0 - 48.0 %    MCV 87.8 74.0 - 97.0 FL    MCH 30.1 24.0 - 34.0 PG    MCHC 34.2 31.0 - 37.0 g/dL    RDW 13.2 11.6 - 14.5 %    PLATELET 710 008 - 412 K/uL    MPV 9.7 9.2 - 11.8 FL    NEUTROPHILS 52 40 - 73 %    LYMPHOCYTES 33 21 - 52 %    MONOCYTES 6 3 - 10 %    EOSINOPHILS 9 (H) 0 - 5 %    BASOPHILS 0 0 - 2 %    ABS. NEUTROPHILS 3.5 1.8 - 8.0 K/UL    ABS. LYMPHOCYTES 2.3 0.9 - 3.6 K/UL    ABS. MONOCYTES 0.4 0.05 - 1.2 K/UL    ABS. EOSINOPHILS 0.6 (H) 0.0 - 0.4 K/UL    ABS. BASOPHILS 0.0 0.0 - 0.1 K/UL    DF AUTOMATED     METABOLIC PANEL, BASIC   Result Value Ref Range    Sodium 144 136 - 145 mmol/L    Potassium 3.7 3.5 - 5.5 mmol/L    Chloride 110 100 - 111 mmol/L    CO2 29 21 - 32 mmol/L    Anion gap 5 3.0 - 18 mmol/L    Glucose 123 (H) 74 - 99 mg/dL    BUN 14 7.0 - 18 MG/DL    Creatinine 1.20 0.6 - 1.3 MG/DL    BUN/Creatinine ratio 12 12 - 20      GFR est AA >60 >60 ml/min/1.73m2    GFR est non-AA >60 >60 ml/min/1.73m2    Calcium 8.3 (L) 8.5 - 10.1 MG/DL       Mental Status Examination     Appearance/Hygiene 41 y.o.  BLACK OR  male  Hygiene: Fair   Behavior/Social Relatedness appropriate   Musculoskeletal Gait/Station: appropriate  Tone (flaccid, cogwheeling, spastic): not assessed  Psychomotor (hyperkinetic, hypokinetic): calm  Involuntary movements (tics, dyskinesias, akathisa, stereotypies): none   Speech   Rate, rhythm, volume, fluency and articulation are appropriate   Mood   mildly depressed   Affect    wide range   Thought Process Linear and goal directed    Vagueness, incoherence, circumstantiality, tangentiality, neologisms, perseveration, flight of ideas, or self-contradictory statements not present on assessment   Thought Content and Perceptual Disturbances Denies delusions, ideas of reference, overvalued ideas, ruminations, obsession, compulsions, and phobias    Denies self-injurious behavior (SIB), suicidal ideation (SI), aggressive behavior or homicidal ideation (HI)    Denies auditory and visual hallucinations   Sensorium and Cognition  AOx4, attention intact, memory intact, language use appropriate, and fund of knowledge age appropriate   Insight  Limited   Judgment  fair       Suicide Risk Assessment     Admission  Date/Time: 04/20/20    [] Admission  [] Discharge     Patient is at a low acute risk of suicide. Patient denies a history of prior suicide attempts and he denies any current suicidal ideation. However risk factors include homelessness, lack of social support and chronic drug use. Assessment and Plan     Psychiatric Diagnoses:   Patient Active Problem List   Diagnosis Code    Cocaine use disorder, severe, dependence ([facility] F14.20    Depressive disorder F32.9       Psychosocial and contextual factors: Homeless, unemployment, lack of social support, chronic addiction    Level of impairment/disability: Moderate to severe    Layton Robles is a 41 y.o. who is currently requiring acute stabilization after endorsing suicidal ideation. 1. Admit to locked inpatient behavioral health unit. Start milieu, group, art and occupation therapy. 2. Continue Wellbutrin  mg daily for mood  3. Routine labs ordered and reviewed by this provider. 4. Reviewed instructions, risks, benefits and side effects. 5. Start disposition planning; verify upcoming outpatient appointments with therapist and/or psychiatric medication prescriber.    6. Tentative date of discharge: 3-5 days       Kimberly Stanley MD  [id number]

## 2020-04-20 NOTE — GROUP NOTE
JUANIS  GROUP DOCUMENTATION INDIVIDUAL Group Therapy Note Date: 4/19/2020 Group Start Time: 2000 Group End Time: 2030 Group Topic: Nursing [facility] ADULT CHEM DEP Darryn Becerril., RN 
 
IP  GROUP DOCUMENTATION GROUP Group Therapy Note Attendees: 7 Attendance: Did not attend  Group. Ashleigh Galaviz.  Shade Huber

## 2020-04-20 NOTE — BSMART NOTE
[address] [facility] Biopsychosocial Assessment Current Level of Psychosocial Functioning  
 
[x]Independent 
[]Dependent []Minimal Assist 
 
 
Comments:   
 
Psychosocial High Risk Factors (check all that apply) []Unable to obtain meds []Chronic illness/pain   
[x]Substance abuse  
[]Lack of Family Support []Financial stress []Isolation []Inadequate [employer] [x]Suicide attempt(s) []Not taking medications []Victim of crime []Developmental Delay 
[]Unable to manage personal needs []Age 72 or older  
[]  Homeless [][place] transportation []Readmission within 30 days []Unemployment []Traumatic Event Psychiatric Advanced Directive: None Family to involve in treatment: None reported by patient Sexual Orientation:  Heterosexual 
 
Patient Strengths: Unknown at this time Patient Barriers: Patient is unable to accept responsibility for his addiction. Opiate education provided: SW is encouraged to address history of substance abuse. SW addressed relapse prevention and self efficacy. Safety plan: Patient is able to contract for safety at this time. CMHC/MH history:  Patient has a history of hospitalizations and failed attempts with substance abuse treatment. Plan of Care: 
medication management, group/individual therapies, family meetings, psycho -education, treatment team meetings to assist with stabilization Initial Discharge Plan:  SW will seek substance abuse treatment. Clinical Summary:  Neal Palmer is a 41 y.o. male with hx of asthma, GERD, bipolar d/o, cocaine use who presents with complaint of drug abuse and suicidal ideation. Patient notes last use of crack cocaine was last night. Patient notes history of abuse in the past, has been trying to get admitted to a rehab center.  Pt denies visual or auditory hallucinations, homicidal ideation, alcohol use, suicidal plan.    
 
Maciel Began, DOROTHYW-E

## 2020-04-20 NOTE — BSMART NOTE
SOCIAL WORK GROUP THERAPY PROGRESS NOTE Group Time:  10:30am 
 
Group Topic:  Coping Skills Group Participation:  
 
Pt expressed not interested in attending session despite staff support

## 2020-04-20 NOTE — BSMART NOTE
OCCUPATIONAL THERAPY PROGRESS NOTE Group time:1410 The patient declined group. In day area with peers when group announced and got up and returned to his room. Declined when asked to join. Inocencia Painting

## 2020-04-21 PROCEDURE — 74011250637 HC RX REV CODE- 250/637: Performed by: PSYCHIATRY & NEUROLOGY

## 2020-04-21 PROCEDURE — 65220000003 HC RM SEMIPRIVATE PSYCH

## 2020-04-21 RX ADMIN — HYDROXYZINE PAMOATE 50 MG: 50 CAPSULE ORAL at 22:07

## 2020-04-21 RX ADMIN — BUPROPION HYDROCHLORIDE 150 MG: 150 TABLET, FILM COATED, EXTENDED RELEASE ORAL at 08:42

## 2020-04-21 RX ADMIN — TRAZODONE HYDROCHLORIDE 50 MG: 50 TABLET ORAL at 22:07

## 2020-04-21 NOTE — BSMART NOTE
History: Abril Nichols is a 41 y.o. male with hx of asthma, GERD, bipolar d/o, cocaine use who presents with complaint of drug abuse and suicidal ideation. Patient notes last use of crack cocaine was last night. Patient notes history of abuse in the past, has been trying to get admitted to a rehab center. Pt denies visual or auditory hallucinations, homicidal ideation, alcohol use, suicidal plan. SW made contact with patient as he engaged with peers in the milieu. Patient denies SI/HI. Patient denies AVH. Patient reports he is in need of assistance with obtaining his personal Identification card. Patient is informed he must allow his sister to assist him due to the inability to access the Internet while hospitalized. Patient continues to report his chronic Homelessness and his inability to remain free from substances. SW provided supportive counseling and encouraged patient to accept responsibility for his actions. SW addressed relapse prevention and self esteem. SW contacted Crisis Stabilization however, there is currently no male placement. Patient reports he is not willing to seek placement in another state due to his legal troubles and court case scheduled for next month. SW assisted patient with contacting [facility] funding for the homeless population and patient was informed that he is not considered homeless due to him having SNAP benefits which are linked to an address in [place]. Patient reports this is his sisters address and he does not reside in her home. Patient reported he was not able to receive assistance from the program.  Baltazar Archibald will continue to monitor patient and will assist with discharge plan. Sasha Queen, BSW, MSW, LCSW-E

## 2020-04-21 NOTE — GROUP NOTE
JUANIS  GROUP DOCUMENTATION INDIVIDUAL Group Therapy Note Date: 4/20/2020 Group Start Time: 80 Group End Time: 2015 Group Topic: Nursing [facility] SPECIAL TRTMT 1 NORAH Braden  GROUP DOCUMENTATION GROUP Group Therapy Note Attendees: 10 Attendance: Attended Interventions/techniques: Informed Follows Directions: Followed directions Interactions: Interacted appropriately Mental Status: Calm Behavior/appearance: Cooperative Goals Achieved: Able to engage in interactions and Able to listen to others Pallavi Sotelo RN

## 2020-04-21 NOTE — PROGRESS NOTES
Problem: Falls - Risk of 
Goal: *Absence of Falls Description: Document Starleen Freeze Fall Risk and appropriate interventions in the flowsheet. AEB remaining free of falls daily while hospitalized. Outcome: Progressing Towards Goal 
Note: Fall Risk Interventions: 
 
Medication Interventions: Teach patient to arise slowly Problem: Suicide Goal: *STG/LTG: Complies with medication therapy Description: AEB taking all medication as prescribed daily while hospitalized. Outcome: Progressing Towards Goal 
  
Patient has been focused on getting [place] I.D. reissued due to losing his. Patient asked if his \"plasma card\" could be used to pay for another I.D. Patient stated his card does not have money on it \"but I can donate on Thursday. \"  Patient reassured by this nurse \"that works out perfectly because the [facility] is supposed to open back up on the 23rd and that will be Thursday. \"  Patient's wallet with \"plasma card \" placed in patient's locker behind RN desk. Patient's other focus has been toward hunger and requesting more food. Patient was given crackers, peanut butter and jelly several times throughout day and evening shift. Patient has been compliant with scheduled medications and has not required PRN medications. Patient has performed ADL's without prompting. Will continue to monitor and provide interventions as appropriate.

## 2020-04-21 NOTE — BSMART NOTE
SOCIAL WORK GROUP THERAPY PROGRESS NOTE Group Time:  10:30am 
 
Group Topic:  Coping Skills Group Participation: Pt was unavailable for group due to choosing to speak with nursing staff instead about obtaining his I D.

## 2020-04-21 NOTE — PROGRESS NOTES
[address]  Inpatient Progress Note     Date of Service: 04/21/20  Hospital Day: 2     Subjective/Interval History   04/21/20    Treatment Team Notes:  Notes reviewed and/or discussed and report that Glenn Prater is a 49-year-old male with history of cocaine use disorder admitted for suicidal ideation. Patient has been focused on getting his ID while on the unit. No other behavioral issues. Patient interview: Glenn Prater was interviewed by this writer today. All patient is focused on is how to get his ID. He wants to staff to go online and apply for his ID on his behalf. I have informed him that this is not possible but he is unable or unwilling to understand and keeps insisting that we need to help him get his ID as the [facility] is closed, the DMV is closed and we have Internet access. He is not presenting with any other symptoms of mental illness. He denies suicidal ideation. He is focused on getting his ID, figuring out how to get his W-2 from the last time he was employed so that he does not miss out on the stimulus check. Objective     Visit Vitals  /74 (BP 1 Location: Left arm, BP Patient Position: At rest)   Pulse 71   Temp 97.6 °F (36.4 °C)   Resp 18   Ht 6' 2\" (1.88 m)   Wt 86.2 kg (190 lb)   SpO2 98%   BMI 24.39 kg/m²       No results found for this or any previous visit (from the past 24 hour(s)). Mental Status Examination     Appearance/Hygiene 41 y.o.  BLACK OR  male  Hygiene: Good   Behavior/Social Relatedness Appropriate   Musculoskeletal Gait/Station: appropriate  Tone (flaccid, cogwheeling, spastic): not assessed  Psychomotor (hyperkinetic, hypokinetic): calm   Involuntary movements (tics, dyskinesias, akathisa, stereotypies): none   Speech   Rate, rhythm, volume, fluency and articulation are appropriate   Mood   irritable   Affect    congruent   Thought Process Linear and goal directed   Thought Content and Perceptual Disturbances Denies self-injurious behavior (SIB), suicidal ideation (SI), aggressive behavior or homicidal ideation (HI)    Denies auditory and visual hallucinations   Sensorium and Cognition  Grossly intact   Insight  Limited   Judgment  Limited        Assessment/Plan      Psychiatric Diagnoses:   Patient Active Problem List   Diagnosis Code    Cocaine use disorder, severe, dependence ([facility] F14.20    Depressive disorder F32.9       Psychosocial and contextual factors: Homelessness, unemployment, chronic addiction    Level of impairment/disability: Moderate to severe    Emory Lehman is a 41 y.o. who is currently admitted for suicidal ideation but currently denies suicidal ideation. States he has not been able to get into substance abuse treatment facility.  trying to refer him to some substance abuse rehab facilities in the area. He is on probation and cannot go far away from this area. Continue current treatment plan.       MD DR. SILVANO [facility]  Psychiatry

## 2020-04-21 NOTE — PROGRESS NOTES
Problem: Falls - Risk of  Goal: *Absence of Falls  Description: Document [place] Flow Fall Risk and appropriate interventions in the flowsheet. AEB remaining free of falls daily while hospitalized. Outcome: Progressing Towards Goal  Note: Fall Risk Interventions:            Medication Interventions: Teach patient to arise slowly                   Problem: Suicide  Goal: *STG: Seeks staff when feelings of self harm or harm towards others arise  Description: AEB seeking staff when feelings of harm towards self/others arise, for each occurrence daily while hospitalized. Outcome: Progressing Towards Goal     Problem: Suicide  Goal: *STG/LTG: Complies with medication therapy  Description: AEB taking all medication as prescribed daily while hospitalized. Outcome: Progressing Towards Goal   Patient noted with sad affect at times. Brightens when engaged in conversation. Needed much encouragement to attend group activities but did so and appeared to enjoy himself and participated fully. Denies +SI/HI/AH. Contracts for safety on unit. No behavioral issues noted. States he is trying to get his ID so he can handle his business; referred to . Will continue to monitor for safety.

## 2020-04-21 NOTE — BH NOTES
ZA Note: The above pt has been alert cooperative with staff when he needs something through out the shift. He at times has appeared to be entitled and intrusive at times during the shift. He appeared to be focused on getting ;his identification while in the hospital this shift. He has been on the phone with family and friends this shift. He was educated on learning how to prioritize stressors when out in the community during this conversation. He appeared to be receptive to the suggestions that were given to the above pt by staff during this conversation. He has attended community, and nursing group this shift. He verbally contract for safety and denies any self-harm at this time.

## 2020-04-21 NOTE — BSMART NOTE
OCCUPATIONAL THERAPY PROGRESS NOTE Group Time:  1400 Attendance: The patient attended full group. Participation: The patient participated with minimal elaboration in the activity. Attention: The patient was able to focus on the activity. Interaction: The patient occasionally  interacts with others. Superficial and non-specific in answers despite encouragement.

## 2020-04-22 VITALS
HEIGHT: 74 IN | RESPIRATION RATE: 18 BRPM | WEIGHT: 190 LBS | BODY MASS INDEX: 24.38 KG/M2 | OXYGEN SATURATION: 98 % | SYSTOLIC BLOOD PRESSURE: 122 MMHG | HEART RATE: 71 BPM | TEMPERATURE: 98 F | DIASTOLIC BLOOD PRESSURE: 75 MMHG

## 2020-04-22 PROCEDURE — 74011250637 HC RX REV CODE- 250/637: Performed by: PSYCHIATRY & NEUROLOGY

## 2020-04-22 RX ADMIN — BUPROPION HYDROCHLORIDE 150 MG: 150 TABLET, FILM COATED, EXTENDED RELEASE ORAL at 08:25

## 2020-04-22 NOTE — BH NOTES
Reviewed discharge instructions with patient, answered all questions, and removed armband. Pt stable and in no distress. Accompanied downstairs for discharge.

## 2020-04-22 NOTE — DISCHARGE INSTRUCTIONS
BEHAVIORAL HEALTH NURSING DISCHARGE NOTE      The following personal items collected during your admission are returned to you:   Dental Appliance: Dental Appliances: None  Vision: Visual Aid: None  Hearing Aid:    Jewelry: Jewelry: Bracelet, Earrings(on patiant arm, in patient ear)  Clothing: Clothing: Footwear, Jacket/Coat, [place], [place], Socks, Undergarments  Other Valuables: Other Valuables: (blue bookbag)  Valuables sent to safe:        PATIENT INSTRUCTIONS:    Patient armband removed and shredded    The discharge information has been reviewed with the patient. The patient verbalized understanding.     Phone numbers to remember:  [facility] Desk: [address] [facility]: [phone]  Suicide Prevention: [phone]

## 2020-04-30 NOTE — DISCHARGE SUMMARY
[facility]  Inpatient Psychiatry   Discharge Summary     Admit date: 4/19/2020    Discharge date and time: 4/22/2020 11:02 AM    Discharge Physician: Alban Patton MD    DISCHARGE DIAGNOSES     Psychiatric Diagnoses:   Patient Active Problem List   Diagnosis Code    Cocaine use disorder, severe, dependence ([facility] [address] F14.20    Depressive disorder F32.9       Level of impairment/disability:  None    HOSPITAL COURSE   Vivi Jasso is a 41 y.o. BLACK OR  male with a history of severe cocaine use disorder and unspecified depressive disorder who was admitted voluntarily from our ED for suicidal ideation with no plan. Patient is well-known to this facility and has had multiple hospitalizations here. He was just discharged from here at the beginning of this month. Patient is homeless and appears to get some secondary gain from being in the hospital.  When he was discharged on April 2, [facility]. Patient states he went to both places and also tried to get into the [place] crisis stabilization unit but all the places were full and nobody accepted him. He says he had nowhere to go and eventually relapsed on cocaine. He is feeling helpless and hopeless and was having suicidal ideations yesterday so decided to come back to the hospital.  He also has not been  taking Wellbutrin consistently as prescribed. His UDS was positive for marijuana and cocaine. He denies use of alcohol. He is very vague with providing details on amount and frequency of use. Today he says he needs help with getting an ID as he is unable to get into shelters without an ID. He is also hoping to get his stimulus checked but will not be able to catch it or open the bank account without an ID. He wants to have computer access so he could apply for an ID on line.     Hospital course: Patient admitted and monitored for suicidal ideation. Once admitted to the unit, patient denied suicidal ideation. Patient has a history of malingering and tends to use the hospital for shelter. He knows that if he says he is suicidal in the emergency department he will be admitted but once admitted denies any type of suicidal ideations or plan. He did not present with any symptoms of mental illness during this hospitalization. He was more focused on us helping him to get his ID which he says was lost.  He was focused on trying to use the computer in order to apply for a new ID so that he could potentially get his stimulus check from the government. After a couple of days, he was ready to be discharged and requesting discharge as his usual pattern. He stated that his sister had applied for his ID on his behalf and he was going to be mailed to her home. He also has some upcoming legal issues and was therefore unwilling to pursue long-term substance abuse treatment for cocaine use disorder. No SI, HI, wilbert or psychosis during this hospitalization. He was not aggressive or agitated and did not require seclusion or restraint. DISPOSITION/FOLLOW-UP     Disposition: Patient was referred to contact over the [facility] emergency funding regional housing crisis hotline as he is currently homeless. However, he has listed his sister's address on his application for SNAP benefits. Follow-up Appointments: Follow-up Information     Follow up With Specialties Details Why Contact Info    None    None ([phone]) Patient stated that they have no PCP          Patient can also follow up with by appointment made by patient  due to Stuart Santos [address], [address] [place] [address]  [phone]        Apt made by patient with [facility] this morning. He states that he will be getting their independently.             MEDICATION CHANGES   Outpatient medications:  No current facility-administered medications on file prior to encounter. Current Outpatient Medications on File Prior to Encounter   Medication Sig Dispense Refill    buPROPion XL (WELLBUTRIN XL) 150 mg tablet Take 1 Tab by mouth daily (after breakfast). Indications: Mood disorder 30 Tab 0         Medications discontinued during hospitalization:  Medications Discontinued During This Encounter   Medication Reason    buPROPion XL (WELLBUTRIN XL) tablet 150 mg Patient Discharge    ibuprofen (MOTRIN) tablet 600 mg Patient Discharge    hydrOXYzine pamoate (VISTARIL) capsule 50 mg Patient Discharge    traZODone (DESYREL) tablet 50 mg Patient Discharge    haloperidoL (HALDOL) tablet 5 mg Patient Discharge    haloperidol lactate (HALDOL) injection 5 mg Patient Discharge         Discharged medication:  Discharge Medication List as of 4/29/2020 11:50 AM      CONTINUE these medications which have NOT CHANGED    Details   buPROPion XL (WELLBUTRIN XL) 150 mg tablet Take 1 Tab by mouth daily (after breakfast). Indications: Mood disorder, Print, Disp-30 Tab, R-0             Instructions, risks (black box warning), benefits and side effects (EPS, TD, NMS) were discussed in detail prior to discharge. Patient denied any adverse medication side effects prior to discharge.        LABS/IMAGING DURING ADMISSION     Results for orders placed or performed during the hospital encounter of 04/19/20   DRUG SCREEN, URINE   Result Value Ref Range    BENZODIAZEPINES Negative NEG      BARBITURATES Negative NEG      THC (TH-CANNABINOL) Positive (A) NEG      OPIATES Negative NEG      PCP(PHENCYCLIDINE) Negative NEG      COCAINE Positive (A) NEG      AMPHETAMINES Negative NEG      METHADONE Negative NEG      HDSCOM (NOTE)    ETHYL ALCOHOL   Result Value Ref Range    ALCOHOL(ETHYL),SERUM <3 0 - 3 MG/DL   CBC WITH AUTOMATED DIFF   Result Value Ref Range    WBC 6.8 4.6 - 13.2 K/uL    RBC 4.99 4.70 - 5.50 M/uL    HGB 15.0 13.0 - 16.0 g/dL    HCT 43.8 36.0 - 48.0 %    MCV 87.8 74.0 - 97.0 FL    MCH 30.1 24.0 - 34.0 PG    MCHC 34.2 31.0 - 37.0 g/dL    RDW 13.2 11.6 - 14.5 %    PLATELET 083 280 - 149 K/uL    MPV 9.7 9.2 - 11.8 FL    NEUTROPHILS 52 40 - 73 %    LYMPHOCYTES 33 21 - 52 %    MONOCYTES 6 3 - 10 %    EOSINOPHILS 9 (H) 0 - 5 %    BASOPHILS 0 0 - 2 %    ABS. NEUTROPHILS 3.5 1.8 - 8.0 K/UL    ABS. LYMPHOCYTES 2.3 0.9 - 3.6 K/UL    ABS. MONOCYTES 0.4 0.05 - 1.2 K/UL    ABS. EOSINOPHILS 0.6 (H) 0.0 - 0.4 K/UL    ABS. BASOPHILS 0.0 0.0 - 0.1 K/UL    DF AUTOMATED     METABOLIC PANEL, BASIC   Result Value Ref Range    Sodium 144 136 - 145 mmol/L    Potassium 3.7 3.5 - 5.5 mmol/L    Chloride 110 100 - 111 mmol/L    CO2 29 21 - 32 mmol/L    Anion gap 5 3.0 - 18 mmol/L    Glucose 123 (H) 74 - 99 mg/dL    BUN 14 7.0 - 18 MG/DL    Creatinine 1.20 0.6 - 1.3 MG/DL    BUN/Creatinine ratio 12 12 - 20      GFR est AA >60 >60 ml/min/1.73m2    GFR est non-AA >60 >60 ml/min/1.73m2    Calcium 8.3 (L) 8.5 - 10.1 MG/DL        DISCHARGE MENTAL STATUS EVALUATION     Appearance/Hygiene 41 y.o. BLACK OR  male  Hygiene: Good   Attitude/Behavior/Social Relatedness Appropriate   Musculoskeletal Gait/Station: appropriate  Tone (flaccid, cogwheeling, spastic): not assessed  Psychomotor (hyperkinetic, hypokinetic): calm  Involuntary movements (tics, dyskinesias, akathisa, stereotypies): none   Speech   Rate, rhythm, volume, fluency and articulation are appropriate   Mood   euthymic   Affect    congruent   Thought Process Linear and goal directed   Thought Content and Perceptual Disturbances Denies self-injurious behavior (SIB), suicidal ideation (SI), aggressive behavior or homicidal ideation (HI)    Denies auditory and visual hallucinations   Sensorium and Cognition  Grossly intact   Insight  Limited   Judgment  Limited       SUICIDE RISK ASSESSMENT     [] Admission  [x] Discharge     Key Factors:   Current admission precipitated by suicide attempt?   []  Yes     2    [x]  No     1     Suicide Attempt History  [] Past attempts of high lethality    2 []  Past attempts of low lethality    1 [x]  No previous attempts       0   Suicidal Ideation []  Constant suicidal thoughts      2 []  Intermittent or fleeting suicidal  thoughts  1 [x]  Denies current suicidal thoughts    0   Suicide Plan   []  Has plan with actual OR potential access to planned method    2 []  Has plan without access to planned method      1 [x]  No plan            0   Plan Lethality []  Highly lethal plan (Carbon monoxide, gun, hanging, jumping)    2 []  Moderate lethality of plan          1 []  Low lethality of plan (biting, head banging, superficial scratching, pillow over face)  0   Safety Plan Agreement  []  Unwilling OR unable to agree due to impaired reality testing   2   []  Patient is ambivalent and/or guarded      1 [x]  Reliably agrees        0   Current Morbid Thoughts (reunion fantasies, preoccupations with death) []  Constantly     2     []  Frequently    1 [x]  Rarely    0   Elopement Risk  []  High risk     2 []  Moderate risk    1 [x]   Low risk    0   Symptoms    []  Hopeless  []  Helpless  []  Anhedonia   []  Guilt/shame  []  Anger/rage  []  Anxiety  []  Insomnia   []  Agitation   []  Impulsivity  []  5-6 symptoms present    2 []  3-4 symptoms present    1  [x]  0-2 symptoms present    0     Scoring Key:  10 or higher = Imminent Risk (consider 1:1)  4 - 9 = Moderate Risk (consider q 15 minute observation)Attended alcohol, tobacco, prescription and other drug psychoeducation group.   0 - 3 = Low Risk (consider q 30 minute observation)    Total Score: 1  ------------------------------------------------------------------------------------------------------------------  PLEASE ADDRESS THE FOLLOWING 5 ISSUES     Physician's Subjective Appraisal of Risk (check one):  []  Patient replies not trustworthy: several non-verbal cues. []  Patient replies questionable: trustworthy: at least 1 non-verbal cue. [x]  Patient replies appear trustworthy.     Family History of Suicide? []  Yes  [x]  No    Protective measures (select all that apply):  [x]  Successful past responses to stress  []  Spiritual/Islam beliefs  [x]  Capacity for reality testing  []  Positive therapeutic relationships  [x]  Social supports/connections  []  Positive coping skills  []  Frustration tolerance/optimism  []  Children or pets in the home  []  Sense of responsibility to family  [x]  Agrees to treatment plan and follow up    Others (list):    High Risk Diagnoses (select all that apply):  []  Depression/Bipolar Disorder  []  Dual Diagnosis  []  Cardiovascular Disease  []  Schizophrenia  []  Chronic Pain  []  Epilepsy  []  Cancer  []  Personality Disorder  []  HIV/AIDS  []  Multiple Sclerosis    Dangerousness Assessment (Suicide, homicide, property destruction. ..)    Risk Factors reviewed and risk assessed to be:  [] low  [x] low-moderate  [] moderate   [] moderate-high  [] high     Protection factors reviewed and risk assessed to be:  [x] low  [] low-moderate  [] moderate   [] moderate-high  [] high     Response to treatment and risk assessed to be:  [x] low  [] low-moderate  [] moderate   [] moderate-high  [] high     Support reviewed and risk assessed to be:  [x] low  [] low-moderate  [] moderate   [] moderate-high  [] high     Acceptance of Discharge and outpatient treatment reviewed and risk assessed to be:    [x] low  [] low-moderate  [] moderate   [] moderate-high  [] high   Overall risk assessed to be:  [x] low  [] low-moderate  [] moderate   [] moderate-high  [] high     Completion of discharge was greater than 30 minutes. Over 50% of today's discharge was geared towards counseling and coordination of care.           Kane Gonzalez MD  Psychiatry  [facility]

## 2021-01-26 ENCOUNTER — HOSPITAL ENCOUNTER (INPATIENT)
Age: 41
LOS: 2 days | Discharge: HOME | DRG: 753 | End: 2021-01-29
Attending: EMERGENCY MEDICINE | Admitting: PSYCHIATRY & NEUROLOGY
Payer: MEDICAID

## 2021-01-26 DIAGNOSIS — R45.851 SUICIDAL IDEATION: Primary | ICD-10-CM

## 2021-01-26 DIAGNOSIS — F19.10 DRUG ABUSE (HCC): ICD-10-CM

## 2021-01-26 DIAGNOSIS — R03.0 ELEVATED BLOOD PRESSURE READING: ICD-10-CM

## 2021-01-26 LAB
ALBUMIN SERPL-MCNC: 4.3 G/DL (ref 3.4–5)
ALBUMIN/GLOB SERPL: 1.1 {RATIO} (ref 0.8–1.7)
ALP SERPL-CCNC: 103 U/L (ref 45–117)
ALT SERPL-CCNC: 22 U/L (ref 16–61)
AMPHET UR QL SCN: POSITIVE
ANION GAP SERPL CALC-SCNC: 3 MMOL/L (ref 3–18)
APPEARANCE UR: CLEAR
AST SERPL-CCNC: 21 U/L (ref 10–38)
BACTERIA URNS QL MICRO: ABNORMAL /HPF
BARBITURATES UR QL SCN: NEGATIVE
BASOPHILS # BLD: 0 K/UL (ref 0–0.1)
BASOPHILS NFR BLD: 0 % (ref 0–2)
BENZODIAZ UR QL: NEGATIVE
BILIRUB SERPL-MCNC: 0.9 MG/DL (ref 0.2–1)
BILIRUB UR QL: NEGATIVE
BUN SERPL-MCNC: 17 MG/DL (ref 7–18)
BUN/CREAT SERPL: 12 (ref 12–20)
CALCIUM SERPL-MCNC: 9.4 MG/DL (ref 8.5–10.1)
CANNABINOIDS UR QL SCN: POSITIVE
CHLORIDE SERPL-SCNC: 103 MMOL/L (ref 100–111)
CO2 SERPL-SCNC: 32 MMOL/L (ref 21–32)
COCAINE UR QL SCN: POSITIVE
COLOR UR: ABNORMAL
COVID-19 RAPID TEST, COVR: NOT DETECTED
CREAT SERPL-MCNC: 1.37 MG/DL (ref 0.6–1.3)
DIFFERENTIAL METHOD BLD: ABNORMAL
EOSINOPHIL # BLD: 0.1 K/UL (ref 0–0.4)
EOSINOPHIL NFR BLD: 2 % (ref 0–5)
EPITH CASTS URNS QL MICRO: ABNORMAL /LPF (ref 0–5)
ERYTHROCYTE [DISTWIDTH] IN BLOOD BY AUTOMATED COUNT: 13.2 % (ref 11.6–14.5)
ETHANOL SERPL-MCNC: <3 MG/DL (ref 0–3)
GLOBULIN SER CALC-MCNC: 3.9 G/DL (ref 2–4)
GLUCOSE SERPL-MCNC: 86 MG/DL (ref 74–99)
GLUCOSE UR STRIP.AUTO-MCNC: NEGATIVE MG/DL
HCT VFR BLD AUTO: 48.3 % (ref 36–48)
HDSCOM,HDSCOM: ABNORMAL
HGB BLD-MCNC: 16.8 G/DL (ref 13–16)
HGB UR QL STRIP: NEGATIVE
KETONES UR QL STRIP.AUTO: ABNORMAL MG/DL
LEUKOCYTE ESTERASE UR QL STRIP.AUTO: ABNORMAL
LIPASE SERPL-CCNC: 214 U/L (ref 73–393)
LYMPHOCYTES # BLD: 1.7 K/UL (ref 0.9–3.6)
LYMPHOCYTES NFR BLD: 27 % (ref 21–52)
MCH RBC QN AUTO: 30.4 PG (ref 24–34)
MCHC RBC AUTO-ENTMCNC: 34.8 G/DL (ref 31–37)
MCV RBC AUTO: 87.3 FL (ref 74–97)
METHADONE UR QL: NEGATIVE
MONOCYTES # BLD: 0.4 K/UL (ref 0.05–1.2)
MONOCYTES NFR BLD: 6 % (ref 3–10)
MUCOUS THREADS URNS QL MICRO: ABNORMAL /LPF
NEUTS SEG # BLD: 4.1 K/UL (ref 1.8–8)
NEUTS SEG NFR BLD: 65 % (ref 40–73)
NITRITE UR QL STRIP.AUTO: NEGATIVE
OPIATES UR QL: NEGATIVE
PCP UR QL: NEGATIVE
PH UR STRIP: 5.5 [PH] (ref 5–8)
PLATELET # BLD AUTO: 332 K/UL (ref 135–420)
PMV BLD AUTO: 9.6 FL (ref 9.2–11.8)
POTASSIUM SERPL-SCNC: 4.4 MMOL/L (ref 3.5–5.5)
PROT SERPL-MCNC: 8.2 G/DL (ref 6.4–8.2)
PROT UR STRIP-MCNC: NEGATIVE MG/DL
RBC # BLD AUTO: 5.53 M/UL (ref 4.7–5.5)
RBC #/AREA URNS HPF: NEGATIVE /HPF (ref 0–5)
SARS-COV-2, COV2: NORMAL
SODIUM SERPL-SCNC: 138 MMOL/L (ref 136–145)
SOURCE, COVRS: NORMAL
SP GR UR REFRACTOMETRY: 1.03 (ref 1–1.03)
UROBILINOGEN UR QL STRIP.AUTO: 1 EU/DL (ref 0.2–1)
WBC # BLD AUTO: 6.4 K/UL (ref 4.6–13.2)
WBC URNS QL MICRO: ABNORMAL /HPF (ref 0–4)

## 2021-01-26 PROCEDURE — 87635 SARS-COV-2 COVID-19 AMP PRB: CPT

## 2021-01-26 PROCEDURE — 82077 ASSAY SPEC XCP UR&BREATH IA: CPT

## 2021-01-26 PROCEDURE — 80053 COMPREHEN METABOLIC PANEL: CPT

## 2021-01-26 PROCEDURE — 85025 COMPLETE CBC W/AUTO DIFF WBC: CPT

## 2021-01-26 PROCEDURE — 74011250636 HC RX REV CODE- 250/636: Performed by: PHYSICIAN ASSISTANT

## 2021-01-26 PROCEDURE — 96374 THER/PROPH/DIAG INJ IV PUSH: CPT

## 2021-01-26 PROCEDURE — 99283 EMERGENCY DEPT VISIT LOW MDM: CPT

## 2021-01-26 PROCEDURE — 87086 URINE CULTURE/COLONY COUNT: CPT

## 2021-01-26 PROCEDURE — 83690 ASSAY OF LIPASE: CPT

## 2021-01-26 PROCEDURE — 81001 URINALYSIS AUTO W/SCOPE: CPT

## 2021-01-26 PROCEDURE — 80307 DRUG TEST PRSMV CHEM ANLYZR: CPT

## 2021-01-26 RX ORDER — ONDANSETRON 2 MG/ML
4 INJECTION INTRAMUSCULAR; INTRAVENOUS
Status: COMPLETED | OUTPATIENT
Start: 2021-01-26 | End: 2021-01-27

## 2021-01-26 RX ADMIN — ONDANSETRON 4 MG: 2 INJECTION INTRAMUSCULAR; INTRAVENOUS at 19:17

## 2021-01-26 RX ADMIN — SODIUM CHLORIDE 1000 ML: 900 INJECTION, SOLUTION INTRAVENOUS at 19:18

## 2021-01-26 NOTE — ED NOTES
Suicidal ideation x 1 week. Cocaine use. Denies plan, HI. Abdominal pain, n/v x 1 day. I performed a brief evaluation, including history and physical, of the patient here in triage and I have determined that pt will need further treatment and evaluation from the main side ER physician. I have placed initial orders to help in expediting patients care.      January 26, 2021 at 1:48 PM - RICARDO Oliva

## 2021-01-26 NOTE — ED PROVIDER NOTES
"         Cardiology History and Physical  Liborio Medellin MRN: [id number]  Age: 55 year old, : 1962  Primary care provider: Liborio Mercer            Assessment and Plan:     Liborio Medellin is a 56yo male patient with PMH notable for HTN, tobacco use who presented to [facility] with STEMI s/p PCI with JESSICA to LAD.     #STEMI s/p JESSICA to pLAD  1x week of CP. RF: HTN, tobacco. During procedure had 1x episode of VF arrest which recovered with 1x shock.   - 180mg brillinta in cath lab - 90mg BID to start tomorrow  - reopro gtt to continue for 12 h after intervention  - nitro gtt as needed for pain; goal SBP <150   - holding BB for now given HR 55-60  - atorvastatin 40mg QHS  - s/p 325mg ASA, 81mg QD to start tomorrow   - lipid panel, A1C pending  - telemetry   - cardiac MRI in AM     #HTN - had previously been in diuril, not taking now due to insurance reasons. Will need ACEI - can start tomorrow.      FEN:  - ADAT  - replete lytes K 4, MG 2  PPX: brillinta, asa  Code Status: Full Code      Patient seen and discussed with staff attending, Dr. Dunaway. Feel free to page with questions.     Kourtney Michel MD   Internal Medicine PGY2  Cardiology Service  Pager: [phone]          Chief Complaint:     Chest pain --> STEMI           History of Present Illness:     History is obtained from the patient, EMR, and EMS.     Mr. Liborio Medellin is a 56yo male patient with a PMH of HTN, tobacco use who presented to clinic with complaints of chest pain x1 week, when in transfer to the ED had acute STEMI.     Per EMS, EKG prior to transfer was normal. While en route, patient became clammy, monitor showed diffuse ST elevation. He was urgently brought to [facility], immediately taken to cath lab and underwent PCI to LAD (thrombectomy, JESSICA).     Patient is seen post procedurally, states he feels \"much better than earlier.\" denies chest pain, shortness of breath, nausea. Remainder ROS negative.                 Past Medical History:     History " EMERGENCY DEPARTMENT HISTORY AND PHYSICAL EXAM    2:24 PM      Date: 1/26/2021  Patient Name: Kendra Rush    History of Presenting Illness     Chief Complaint   Patient presents with    Drug Problem         History Provided By: Patient    Additional History (Context): Kendra Rush is a 41 y.o. male with hx of asthma, bipolar d/o, cocaine abuse who presents with complaint of suicidal ideation x 1 week without a plan. Pt notes last use of cocaine was last night. Pt also notes lower abdominal pain associated with n/v x 1 day. Denies fever or chills, chest pain, dyspnea, cough, diarrhea, dysuria, hematuria. Denies sick contacts, known exposure to [place]. Denies visual or auditory hallucinations, homicidal ideation, alcohol use. PCP: None    Current Outpatient Medications   Medication Sig Dispense Refill    buPROPion XL (WELLBUTRIN XL) 150 mg tablet Take 1 Tab by mouth daily (after breakfast). Indications: Mood disorder 30 Tab 0       Past History     Past Medical History:  Past Medical History:   Diagnosis Date    Acid reflux     Asthma     Bipolar disorder, unspecified ([facility] 3/1/2019    Cocaine use disorder, severe, dependence ([facility]        Past Surgical History:  No past surgical history on file. Family History:  No family history on file. Social History:  Social History     Tobacco Use    Smoking status: Current Every Day Smoker     Packs/day: 1.00    Smokeless tobacco: Never Used   Substance Use Topics    Alcohol use: Not Currently    Drug use: Yes     Types: Cocaine       Allergies:  No Known Allergies      Review of Systems       Review of Systems   Constitutional: Negative for chills and fever. Respiratory: Negative for shortness of breath. Cardiovascular: Negative for chest pain. Gastrointestinal: Positive for abdominal pain, nausea and vomiting. Skin: Negative for rash. Neurological: Negative for weakness. Psychiatric/Behavioral: Positive for agitation and suicidal ideas. The patient is nervous/anxious. All other systems reviewed and are negative. Physical Exam     Visit Vitals  BP (!) 158/106   Pulse 75   Temp 98.9 °F (37.2 °C)   Resp 18   SpO2 98%         Physical Exam  Vitals signs and nursing note reviewed. Constitutional:       General: He is not in acute distress. Appearance: Normal appearance. He is well-developed. He is not ill-appearing, toxic-appearing or diaphoretic. HENT:      Head: Normocephalic and atraumatic. Neck:      Musculoskeletal: Normal range of motion and neck supple. Cardiovascular:      Rate and Rhythm: Normal rate and regular rhythm. Heart sounds: Normal heart sounds. No murmur. No friction rub. No gallop. Pulmonary:      Effort: Pulmonary effort is normal. No respiratory distress. Breath sounds: Normal breath sounds. No wheezing or rales. Abdominal:      General: Abdomen is flat. There is no distension. Palpations: Abdomen is soft. Tenderness: There is no abdominal tenderness. There is no guarding or rebound. Musculoskeletal: Normal range of motion. Skin:     General: Skin is warm. Findings: No rash. Neurological:      Mental Status: He is alert. Diagnostic Study Results     Labs -  Recent Results (from the past 12 hour(s))   CBC WITH AUTOMATED DIFF    Collection Time: 01/26/21  1:50 PM   Result Value Ref Range    WBC 6.4 4.6 - 13.2 K/uL    RBC 5.53 (H) 4.70 - 5.50 M/uL    HGB 16.8 (H) 13.0 - 16.0 g/dL    HCT 48.3 (H) 36.0 - 48.0 %    MCV 87.3 74.0 - 97.0 FL    MCH 30.4 24.0 - 34.0 PG    MCHC 34.8 31.0 - 37.0 g/dL    RDW 13.2 11.6 - 14.5 %    PLATELET 897 075 - 892 K/uL    MPV 9.6 9.2 - 11.8 FL    NEUTROPHILS 65 40 - 73 %    LYMPHOCYTES 27 21 - 52 %    MONOCYTES 6 3 - 10 %    EOSINOPHILS 2 0 - 5 %    BASOPHILS 0 0 - 2 %    ABS. NEUTROPHILS 4.1 1.8 - 8.0 K/UL    ABS. LYMPHOCYTES 1.7 0.9 - 3.6 K/UL    ABS. MONOCYTES 0.4 0.05 - 1.2 K/UL    ABS. EOSINOPHILS 0.1 0.0 - 0.4 K/UL    ABS.  BASOPHILS reviewed. No pertinent past medical history.           Past Surgical History:      No past surgical history on file.           Social History:     Social History   Substance Use Topics     Smoking status: Current Some Day Smoker     Types: Cigarettes     Smokeless tobacco: Not on file      Comment: Less than 1 pp week.     Alcohol use Yes      Comment: Off and on-weekends.              Family History:     Family History   Problem Relation Age of Onset     Coronary Artery Disease Father      bypass     GASTROINTESTINAL DISEASE Father      Other Cancer Maternal Grandmother      Coronary Artery Disease Paternal Grandmother      Family history reviewed          Allergies:     No Known Allergies           Medications:     ASA 81mg QD (PTA)                 Physical Exam:     Temp:  [97.1  F (36.2  C)-98.5  F (36.9  C)] 97.1  F (36.2  C)  Pulse:  [75-88] 88  Heart Rate:  [68] 68  Resp:  [8-20] 8  BP: (158-196)/(94-96) 169/94  SpO2:  [90 %-99 %] 99 %    Gen: Resting comfortably in bed, NAD   HEENT:AT/ NC, PERRL b/l, EOM grossly intact, MMM   BACK: no CVA tenderness, no midline bony tenderness  PULM/THORAX: Clear to auscultation bilaterally, no rales/rhonchi/wheezes  CV:RRR, S1 and S2 appreciated, no extra heart sounds, murmurs or rub auscultated.  ABD: soft, nontender, nondistended. Normoactive bowel sounds.   EXT: Warm, well perfused. No LE edema noted. TR band in place. No hematoma.   NEURO: AOx4. Speech fluent and articulate. No focal deficits appreciated.             Data:     Labs   Na 139   Cl 106  BUN21  K 4.0     Co2 26   Cr 1.19  Mg 2.3     WBC 14.7 // Hgb 14.4 // Plt 243           Most Recent Imagin2017    CORONARY ANGIOGRAM:   1. Both coronary arteries arise from their respective cusps.  2. Dominance: Right  3. The LM is without angiographic evidence of disease.   4. LAD: Type 3 [LAD supplies the entire apex]. The LAD gives rise to septal perforators, small D1,and medium sized D2/3.  The pLAD has a  0.0 0.0 - 0.1 K/UL    DF AUTOMATED     METABOLIC PANEL, COMPREHENSIVE    Collection Time: 01/26/21  1:50 PM   Result Value Ref Range    Sodium 138 136 - 145 mmol/L    Potassium 4.4 3.5 - 5.5 mmol/L    Chloride 103 100 - 111 mmol/L    CO2 32 21 - 32 mmol/L    Anion gap 3 3.0 - 18 mmol/L    Glucose 86 74 - 99 mg/dL    BUN 17 7.0 - 18 MG/DL    Creatinine 1.37 (H) 0.6 - 1.3 MG/DL    BUN/Creatinine ratio 12 12 - 20      GFR est AA >60 >60 ml/min/1.73m2    GFR est non-AA 57 (L) >60 ml/min/1.73m2    Calcium 9.4 8.5 - 10.1 MG/DL    Bilirubin, total 0.9 0.2 - 1.0 MG/DL    ALT (SGPT) 22 16 - 61 U/L    AST (SGOT) 21 10 - 38 U/L    Alk.  phosphatase 103 45 - 117 U/L    Protein, total 8.2 6.4 - 8.2 g/dL    Albumin 4.3 3.4 - 5.0 g/dL    Globulin 3.9 2.0 - 4.0 g/dL    A-G Ratio 1.1 0.8 - 1.7     LIPASE    Collection Time: 01/26/21  1:50 PM   Result Value Ref Range    Lipase 214 73 - 393 U/L   ETHYL ALCOHOL    Collection Time: 01/26/21  1:50 PM   Result Value Ref Range    ALCOHOL(ETHYL),SERUM <3 0 - 3 MG/DL   URINALYSIS W/ RFLX MICROSCOPIC    Collection Time: 01/26/21  4:49 PM   Result Value Ref Range    Color DARK YELLOW      Appearance CLEAR      Specific gravity 1.029 1.005 - 1.030      pH (UA) 5.5 5.0 - 8.0      Protein Negative NEG mg/dL    Glucose Negative NEG mg/dL    Ketone TRACE (A) NEG mg/dL    Bilirubin Negative NEG      Blood Negative NEG      Urobilinogen 1.0 0.2 - 1.0 EU/dL    Nitrites Negative NEG      Leukocyte Esterase SMALL (A) NEG     DRUG SCREEN, URINE    Collection Time: 01/26/21  4:49 PM   Result Value Ref Range    BENZODIAZEPINES Negative NEG      BARBITURATES Negative NEG      THC (TH-CANNABINOL) Positive (A) NEG      OPIATES Negative NEG      PCP(PHENCYCLIDINE) Negative NEG      COCAINE Positive (A) NEG      AMPHETAMINES Positive (A) NEG      METHADONE Negative NEG      HDSCOM (NOTE)    URINE MICROSCOPIC ONLY    Collection Time: 01/26/21  4:49 PM   Result Value Ref Range    WBC 4 to 11 0 - 4 /hpf    RBC 95% acute thrombotic lesion (culprit lesion), mLAD has a 25% stenosis, dLAD has a 30% stenosis.  D2 has a 30% proximal area of stenosis. There were signs of distal embolization of the acute thrombus, the apical LAD was 100% occluded in the very distal segment.  5. LCX gives rise to a large OM1 and medium sized OM2.  There is a small OM3.  The LCx and associated branches are without evidence of disease.  6. RCA gives rise to PL branches and supplies PDA. The pRCA has a 0% stenosis, mRCA has a 0% stenosis, dRCA has a 0% stenosis, RPDA has a 0% stenosis and RPLA has a 0% stenosis.       PERCUTANEOUS CORONARY INTERVENTION:   Lesion #1:  LAD: proximal  A 6 Fr Ikari Left 3.5 was positioned at the ostium of the LMCA.  IV UFH was administered to achieve anticoagulation.     A PT2 wire was advanced across the proximal LAD lesion and positioned in the distal vessel. Aspiration thrombectomy was performed via a 6 Fr Quick Cat Extraction Catheter was used to extract 40 cc with two passes at the lesion.  A 2.5*12mm Emerge balloon was used to pre-dilate the lesion. Following that, a 4.0*16mm Synergy drug eluting stent was successfully deployed across the proximal LAD.  The stent was post-dilated with a 4.5*8 mm non-compliant balloon.  Final angiography showed no evidence of perforation or dissection with residual stenosis of 0% and MONSERRAT 3 flow.  No complications.     To address the apical acute occlusion, the PT2 wire was passed to the apical LAD. A 2.0*8 mm Emerge balloon was passed to the apical LAD with 1 low pressure inflation to 4 tamar. Flow distally was not restored. The patient was placed on a G2B3A inhibitor (reopro).      VF Arrest:    Following access the patient had an episode of VF arrest for which a single shock was delivered.  Afterwards he was with hemodynamically stable throughout the rest of the case.     Sheath Removal:  The right radial artery sheath was manually removed in the cardiac catheterization  Negative 0 - 5 /hpf    Epithelial cells 1+ 0 - 5 /lpf    Bacteria FEW (A) NEG /hpf    Mucus 2+ (A) NEG /lpf       Radiologic Studies -   No orders to display         Medical Decision Making   I am the first provider for this patient. I reviewed the vital signs, available nursing notes, past medical history, past surgical history, family history and social history. Vital Signs-Reviewed the patient's vital signs. Records Reviewed: Nursing Notes and Old Medical Records (Time of Review: 2:24 PM)    ED Course: Progress Notes, Reevaluation, and Consults:  5:51 PM: Discussed case with rebecca Wong. Will be down to see patient when placed in a room. 7:58 PM: Discussed with rebecca Wong. Needs repeat BP, rapid COVID, will be admitted to [address] [place] [address]  One or more blood pressure readings were noted elevated during the patient's presentation in the emergency department today. This abnormal reading has been cited in the patients' diagnosis, and they have been encouraged to follow up with their primary care physician, or referred to a consultant for further evaluation and treatment. Provider Notes (Medical Decision Making): 40 yo M with hx of bipolar d/o, cocaine abuse who presents with complaint of suicidal ideation x1 week. Patient medically screened in the ED, evaluated by crisis, will be admitted to behavioral health unit for further assessment. Diagnosis     Clinical Impression:   1. Suicidal ideation    2. Drug abuse ([facility]    3. Elevated blood pressure reading        Disposition: admission     Follow-up Information    None          Patient's Medications   Start Taking    No medications on file   Continue Taking    BUPROPION XL (WELLBUTRIN XL) 150 MG TABLET    Take 1 Tab by mouth daily (after breakfast).  Indications: Mood disorder   These Medications have changed    No medications on file   Stop Taking    No medications on file       Dictation disclaimer:  Please note that this dictation was completed laboratory.     Contrast: Isovue, 270 ml      Fluoroscopy Time: 24.1 min     COMPLICATIONS:  1. None     SUMMARY:   Single vessel CAD  LAD  Successful deployment of a drug eluting stent to the proximal LAD.                                                      PLAN:   >> ASA 81 mg qd and Ticagrelor 90 mg BID   >> Reopro x 12 hours  >> Bedrest per protocol  >> Continued medical management and lifestyle modification for cardiovascular risk factor optimization.   >> Admit to inpatient 4E  >> Cardiac MRI tomorrow         ATTENDING NOTE:  Patient has been seen and evaluated by me on 11/14/2017. I have reviewed the H&P.  Please refer to it for additional details.  I have reviewed today's vital signs, medications, labs, and imaging results.  I have reviewed and edited, as necessary, the history, review of systems, physical examination, and assessment and plan.  I have discussed my assessment and plan with the cardiology fellow.  Liborio Medellin is a 55 year old male with risk factor profile (+) HTN, (-) DM, (-) hypercholesterolemia, (+) 1 pack/week x 30 years, (+) fam Hx premature CAD, presented to [facility] chest pain over a week.  He was seen by a nurse practitioner and was noted to have dynamic ST elevation on the ECG.  The recorded ECG did not show ST elevation.  EMS was called and the patient was emergently transferred to [facility] for further management.  The patient underwent coronary angiography and was found to have a right dominant coronary system with single vessel CAD.  There was a 90% ruptured plaque in the proximal LAD with embolization of thrombus into the apical LAD.  The proximal LAD thrombus was aspirated and the residual stenosis was stented with a 4.0x16 mm Synergy.  In spite of aspiration, [place], and low inflation POBA, I was not able to re-establish flow into the apical LAD.  The patient had residual chest discomfort at the completion of the procedure and there was 1 mm ST  elevation on the telemetry leads.   Check troponins.  Check cardiac MRI to assess for apical viability.  Start ASA, Ticagrelor, BB, ACE-I, and statin.  Follow up ECG in the morning.      Scotty Dunaway MD     Cardiovascular Division           with Dragon, the computer voice recognition software. Quite often unanticipated grammatical, syntax, homophones, and other interpretive errors are inadvertently transcribed by the computer software. Please disregard these errors. Please excuse any errors that have escaped final proofreading.

## 2021-01-27 PROCEDURE — 65220000001 HC RM PRIVATE PSYCH

## 2021-01-27 PROCEDURE — 99222 1ST HOSP IP/OBS MODERATE 55: CPT | Performed by: PSYCHIATRY & NEUROLOGY

## 2021-01-27 PROCEDURE — 74011250637 HC RX REV CODE- 250/637: Performed by: NURSE PRACTITIONER

## 2021-01-27 PROCEDURE — 74011250637 HC RX REV CODE- 250/637: Performed by: PSYCHIATRY & NEUROLOGY

## 2021-01-27 PROCEDURE — 74011250637 HC RX REV CODE- 250/637

## 2021-01-27 PROCEDURE — 74011250636 HC RX REV CODE- 250/636

## 2021-01-27 RX ORDER — LORAZEPAM 1 MG/1
2 TABLET ORAL ONCE
Status: ACTIVE | OUTPATIENT
Start: 2021-01-27 | End: 2021-01-27

## 2021-01-27 RX ORDER — PROMETHAZINE HYDROCHLORIDE 25 MG/1
12.5 TABLET ORAL
Status: DISCONTINUED | OUTPATIENT
Start: 2021-01-27 | End: 2021-01-29 | Stop reason: HOSPADM

## 2021-01-27 RX ORDER — TRAZODONE HYDROCHLORIDE 50 MG/1
50 TABLET ORAL
Status: DISCONTINUED | OUTPATIENT
Start: 2021-01-27 | End: 2021-01-29 | Stop reason: HOSPADM

## 2021-01-27 RX ORDER — PANTOPRAZOLE SODIUM 40 MG/1
40 TABLET, DELAYED RELEASE ORAL 2 TIMES DAILY
Status: DISCONTINUED | OUTPATIENT
Start: 2021-01-27 | End: 2021-01-29 | Stop reason: HOSPADM

## 2021-01-27 RX ORDER — HYDROXYZINE PAMOATE 50 MG/1
50 CAPSULE ORAL
Status: DISCONTINUED | OUTPATIENT
Start: 2021-01-27 | End: 2021-01-29 | Stop reason: HOSPADM

## 2021-01-27 RX ORDER — ONDANSETRON 2 MG/ML
INJECTION INTRAMUSCULAR; INTRAVENOUS
Status: COMPLETED
Start: 2021-01-27 | End: 2021-01-27

## 2021-01-27 RX ORDER — BUPROPION HYDROCHLORIDE 150 MG/1
150 TABLET ORAL DAILY
Status: DISCONTINUED | OUTPATIENT
Start: 2021-01-28 | End: 2021-01-29 | Stop reason: HOSPADM

## 2021-01-27 RX ORDER — AMLODIPINE BESYLATE 5 MG/1
TABLET ORAL
Status: COMPLETED
Start: 2021-01-27 | End: 2021-01-27

## 2021-01-27 RX ADMIN — ONDANSETRON 4 MG: 2 INJECTION INTRAMUSCULAR; INTRAVENOUS at 02:36

## 2021-01-27 RX ADMIN — PANTOPRAZOLE SODIUM 40 MG: 40 TABLET, DELAYED RELEASE ORAL at 20:26

## 2021-01-27 RX ADMIN — PROMETHAZINE HYDROCHLORIDE 12.5 MG: 25 TABLET ORAL at 13:47

## 2021-01-27 RX ADMIN — AMLODIPINE BESYLATE: 5 TABLET ORAL at 02:36

## 2021-01-27 RX ADMIN — TRAZODONE HYDROCHLORIDE 50 MG: 50 TABLET ORAL at 20:26

## 2021-01-27 NOTE — H&P
History and Physical        Patient: Ania Lucas               Sex: male          DOA: 1/26/2021         YOB: 1980      Age:  41 y.o.        LOS:  LOS: 0 days        HPI:     Ania Lucas is a 41 y.o. AA male who was admitted experiencing depression and suicidal ideation    Active Problems:    Depressive disorder (3/29/2020)        Past Medical History:   Diagnosis Date    Acid reflux     Asthma     Bipolar disorder, unspecified ([facility] 3/1/2019    Cocaine use disorder, severe, dependence ([facility]        No past surgical history on file. No family history on file. Social History     Socioeconomic History    Marital status:      Spouse name: Not on file    Number of children: Not on file    Years of education: Not on file    Highest education level: Not on file   Tobacco Use    Smoking status: Current Every Day Smoker     Packs/day: 1.00    Smokeless tobacco: Never Used   Substance and Sexual Activity    Alcohol use: Not Currently    Drug use: Yes     Types: Cocaine    Sexual activity: Not Currently       Prior to Admission medications    Medication Sig Start Date End Date Taking? Authorizing Provider   buPROPion XL (WELLBUTRIN XL) 150 mg tablet Take 1 Tab by mouth daily (after breakfast). Indications: Mood disorder 4/3/20   Rosi Posada MD       No Known Allergies    Review of Systems  A comprehensive review of systems was negative except for that written in the History of Present Illness. Physical Exam:      Visit Vitals  /74 (BP 1 Location: Left arm, BP Patient Position: Sitting)   Pulse 74   Temp 98.8 °F (37.1 °C)   Resp 16   Ht 6' 2\" (1.88 m)   Wt 73.9 kg (163 lb)   SpO2 95%   BMI 20.93 kg/m²       Physical Exam:  Physical Exam:   General:  Alert, cooperative, no distress, appears stated age. Eyes:  Conjunctivae/corneas clear. PERRL, EOMs intact. Fundi benign   Ears:  Normal TMs and external ear canals both ears.    Nose: Nares normal. Septum midline. Mucosa normal. No drainage or sinus tenderness. Mouth/Throat: Lips, mucosa, and tongue normal. Teeth and gums normal.   Neck: Supple, symmetrical, trachea midline, no adenopathy, thyroid: no enlargement/tenderness/nodules, no carotid bruit and no JVD. Back:   Symmetric, no curvature. ROM normal. No CVA tenderness. Lungs:   Clear to auscultation bilaterally. Heart:  Regular rate and rhythm, S1, S2 normal, no murmur, click, rub or gallop. Abdomen:   Soft, non-tender. Bowel sounds normal. No masses,  No organomegaly. Complained of \"stomach being \"upset\"' Denied pain and requesting milk. Extremities: Extremities normal, atraumatic, no cyanosis or edema. Pulses: 2+ and symmetric all extremities. Skin: Skin color, texture, turgor normal. No rashes or lesions   Lymph nodes: Cervical, supraclavicular, and axillary nodes normal.   Neurologic: CNII-XII intact. Normal strength, sensation and reflexes throughout. Assessment/Plan     Patient is cooperative and pleasant. Plan is for patient to participate in all unit activities,  Take medications as prescribed and follow all discharge orders.

## 2021-01-27 NOTE — BSMART NOTE
[facility] Biopsychosocial Assessment Current Level of Psychosocial Functioning  
 
[x]Independent 
[]Dependent []Minimal Assist 
 
 
Comments:   
 
Psychosocial High Risk Factors (check all that apply) []Unable to obtain meds []Chronic illness/pain   
[x]Substance abuse  
[x]Lack of Family Support []Financial stress []Isolation []Inadequate [employer] [x]Suicide attempt(s) [x]Not taking medications []Victim of crime []Developmental Delay 
[]Unable to manage personal needs []Age 72 or older  
[]  Homeless [][place] transportation []Readmission within 30 days [x]Unemployment []Traumatic Event Psychiatric Advanced Directive: none reported by patient Family to involve in treatment: None reported Sexual Orientation: Patient reports he is heterosexual.  
 
Patient Strengths: Unknown at this time. Patient Barriers: Patient reports lack of support with community resources. Opiate education provided: N/A Safety plan: patient is able to contract for safety. CMHC/MH history: Acid reflux    
 Asthma   
 Bipolar disorder, unspecified ([facility] 3/1/2019  Cocaine use disorder, severe, dependence ([facility] Plan of Care: 
medication management, group/individual therapies, family meetings, psycho -education, treatment team meetings to assist with stabilization Initial Discharge Plan:  SW will address services for substance abuse treatment. Clinical Summary:  Layton Robles is a 41 y.o. BLACK OR  male with a history of cocaine use disorder and depressive disorder who was admitted from the ED voluntarily for suicidal ideation without a plan SW made contact with patient as he engaged with peers in the milieu. Patient reports the need for stabilization and substance abuse treatment. Patient reports he has relapsed and is need of long term care. SW addressed options and will refer patient to rehab services.  
 
Li Larson LCSW-E

## 2021-01-27 NOTE — BH NOTES
Minal GREENFIELD   Registered Nurse      [facility] Notes   Signed   Date of Service:  01/27/21                       Dull flat sad guarded cooperative isolative reserved. Stays to self through most of shift. Interacts with staff and peers at a minimal but, appropriately. Anxious. Will continue to monitor and support.

## 2021-01-27 NOTE — BSMART NOTE
ART THERAPY GROUP PROGRESS NOTE Group time:1030 The patient did not awaken/get up when called for group.

## 2021-01-27 NOTE — BSMART NOTE
Comprehensive Assessment Integrated Summary Patient interviewed in Room (FT-1) at the request of [place] Felicianoangie, [address]. Patient is a  41year old who presented to the emergency room with c/o \"feeling depressed with suicidal thoughts, but no plan. \" Patient stated that he is suicidal d/t smoking lots of crack-cocaine. Patient verbalized that he smoked about $300-$400 worth of crack-cocaine on yesterday. \" Patient also verbalized that he usually smokes about $30-$40 worth crack-cocaine on a daily basis. Currently, patient stated that he is still having suicidal thoughts without a plan. Patient denied thoughts of harm towards other, denied hallucinations. C (+) for methamphetamine; however, patient denied use of methamphetamine. Mental Status Exam 
 
The patient's appearance shows no evidence of impairment. The patient's behavior calm, cooperative. The patient is oriented to time, place, person and situation. The patient's speech shows no evidence of impairment. The patient's mood is depressed. The range of affect is flat. The patient's thought content demonstrates no evidence of impairment. The thought process shows no evidence of impairment. The patient's perception shows no evidence of impairment. The patient's memory shows no evidence of impairment. The patient's appetite, \"decreased. \"  The patient's sleep, \"decreased, up getting high. \"  
 
DME: Saray Altamirano Social: \"homeless, 12 grade education, no  history\" Legal Issues: Denied Medications: \"None since last discharge, April, 2020\" Access to weapons: Denied Outpatient Care: None Inpatient Services: \"[facility], April, 2020; [facility], 20 years, ago. \" 
Contact/Support Person: Erin Mcknight, mom, [phone]\" Disposition Patient is receptive to voluntary admission at [facility]; discussed with on-call psychiatrist, admission orders received, and report called to charge nurse.  
 
Saturnino Harmon, NORAH, BSN

## 2021-01-27 NOTE — BSMART NOTE
Social Work Group 1/27/21 Positive Goal Setting Attendance  did not attend Number of participants 4 Time in 3:10 Time out 3:45 Total Time 35 Interaction Resting in room Interventions/techniques Informed and encouraged pt to attend  group Dagmar Atkinson MA, LMHP-R

## 2021-01-27 NOTE — PROGRESS NOTES
Problem: Crack/Cocaine Withdrawal  Goal: *STG: Participates in treatment plan  Description: Patient will participate in his treatment plan daily  1/27/2021 1636 by Kim GREENFIELD  Outcome: Progressing Towards Goal  1/27/2021 1604 by Kim GREENFIELD  Outcome: Progressing Towards Goal  Goal: *STG: Attends activities and groups  Description: Patient will attend at least 2 groups daily. 1/27/2021 1636 by Kim GREENFIELD  Outcome: Progressing Towards Goal  1/27/2021 1604 by Kim GREENFIELD  Outcome: Progressing Towards Goal  Goal: *STG: Will identify negative impact of chemical dependency including the use of tobacco, alcohol, and other substances  Description: Patient will identify at least 2 ways cocaine is a negative impact on his life during 1:1 with staff during hospitalization  Outcome: Progressing Towards Goal  Goal: *STG: Agrees to participate in outpatient after care program to support ongoing mental health  Description: Patient will agree to follow up with outpatient treatment program prior to discharge. Outcome: Progressing Towards Goal  Goal: Interventions  Outcome: Progressing Towards Goal     Problem: Suicide  Goal: *STG: Remains safe in hospital  Description: Pt will verbally contract for safety daily while hospitalized. Outcome: Progressing Towards Goal  Goal: *STG: Seeks staff when feelings of self harm or harm towards others arise  Description: Patient will be able to seek staff when feelings/thoughts of self harm or harming others arise daily and as needed. Outcome: Progressing Towards Goal  Goal: *STG:  Verbalizes alternative ways of dealing with maladaptive feelings/behaviors  Description: Pt will identify at least 2 alternative coping skills and practice implementing during 1 to 1 throughout hospital stay.    Outcome: Progressing Towards Goal  Goal: *STG/LTG: Complies with medication therapy  Description: Will take scheduled medications daily as ordered during her hospital stay under direct supervision. Outcome: Progressing Towards Goal  Goal: *STG/LTG: No longer expresses self destructive or suicidal thoughts  Description: Patient will no longer express suicidal/homicidal ideation prior to discharge with staff. Outcome: Progressing Towards Goal  Goal: *LTG:  Develops proactive suicide prevention plan  Description: Patient will verbalize suicide prevention plan prior to discharge. Outcome: Progressing Towards Goal  Goal: Interventions  Outcome: Progressing Towards Goal     Problem: Falls - Risk of  Goal: *Absence of Falls  Description: Document [place] Led Fall Risk and appropriate interventions in the flowsheet.   Outcome: Progressing Towards Goal  Note: Fall Risk Interventions:            Medication Interventions: Assess postural VS orthostatic hypotension, Evaluate medications/consider consulting pharmacy, Teach patient to arise slowly

## 2021-01-27 NOTE — BH NOTES
Patient was lying in bed appearing asleep upon Staff arrival on unit this morning. Patient did Not eat his Breakfast, and Patient only ate about 25% of his Lunch during shift today. Patient stated to Staff that he \"was sick on the stomach\" and that he needed something like milk to coat his stomach. Staff notified Unit Nurse of Patient's concern regarding his stomach and appetite. Patient did Not Attend any Group Sessions today during shift. Patient did Not exhibit any defiant, negative, aggressive behavior today during shift. Patient did Not interact with Peers today during shift. Patient mildly interacted with Staff and cooperated with Staff as deemed necessary. Staff will continue to monitor Patient for safety, behavior and location.

## 2021-01-27 NOTE — BH NOTES
Patient admitted to [facility], report given to [id number] y 72 from [address] Patient was cooperative and pleasant during assessment. Affect depressed and preoccupied. Reports he can not stop using cocaine and has been feeling depressed and suicidal with no plan. Patient verbally contracts for safety this shift. Reports appetite has been poor with weight loss. In ER patient was having nausea ,abdominal pain, and elevated blood pressure. He was medicated Zofran and Norvasc . Patient now reports he feels somewhat better. RNs will initiate,develop,implement,review or revise treatment plan

## 2021-01-27 NOTE — BH NOTES
Pt c/o nausea and stomach upset. Pt stated, \"I want to try to drink some milk to coat my stomach. \" Pt received PRN Phenergan. Will continue to monitor for effectiveness.

## 2021-01-27 NOTE — H&P
[address] [facility] [address]  Inpatient Admission Note    Date of Service:  01/27/21    Historian(s): Ramesh Naqvi and chart review  Referral Source: [facility]    Chief Complaint   Suicidal ideation    History of Present Illness     Ramesh Naqvi is a 41 y.o. BLACK OR  male with a history of cocaine use disorder and depressive disorder who was admitted from the ED voluntarily for suicidal ideation without a plan. Patient is well-known to this facility and has a history of malingering. He tends to endorse suicidal ideations in the ED when he wants to be admitted to the unit. Patient reports that he has been using cocaine on a daily basis. He relapsed 2 weeks after his last hospitalization here in April. He says he tried to follow-up with the [facility] but he could not follow-up due to not having certain documents such as ID. He says he also tried to go to the [facility] residential treatment program but was not accepted. He says he came to the hospital to seek help with his cocaine addiction because he is tired of using cocaine. He is using at least $50 worth of cocaine on a daily basis and he reported to the crisis nurse that he used $300-$400 worth of cocaine two nights ago. Patient denies depressed mood at this time. He denies suicidal ideation. He is complaining of nausea and decreasing appetite. Also poor sleep due to cocaine use. He says he has lost weight in the past months. He wants to be referred to a 30-day residential treatment facility. The patient's UDS was positive for amphetamines, cocaine and marijuana. He denies use of amphetamines but endorses daily use of marijuana as well. He denies use of alcohol, heroine or other recreational drugs besides cocaine and marijuana. Medical Review of Systems     Constitutional: No fever or chills. All other systems reviewed and negative except for what is mentioned in HPI.     Psychiatric Treatment History Patient has had numerous hospitalizations to this facility for suicidal ideation although he has actually never had a suicide attempt. [facility] has a long history of treatment noncompliance and tends not to go for outpatient follow-up appointments. [facility] is supposed to follow-up with the [facility] but has actually never done so. [facility] has been treated in the past with Depakote, Remeron and olanzapine.  During one of his hospitalizations he initially endorsed auditory hallucinations but at the time he was discharged, he stated he never did have auditory hallucinations and just reported that symptom in order to be hospitalized. So far, the undersigned has not observed symptoms of wilbert, psychosis or significant depression in this patient. Allergies    No Known Allergies    Medical History     Past Medical History:   Diagnosis Date    Acid reflux     Asthma     Bipolar disorder, unspecified ([facility] 3/1/2019    [facility], severe, dependence [facility]        Medication(s)     Prior to Admission Medications   Prescriptions Last Dose Informant Patient Reported? Taking? buPROPion XL (WELLBUTRIN XL) 150 mg tablet 4/27/2020 at Unknown time  No No   Sig: Take 1 Tab by mouth daily (after breakfast). Indications: Mood disorder      Facility-Administered Medications: None         Substance Abuse History   Patient has a long history of cocaine use disorder.  He has been using cocaine for at least 10 years. [facility] uses daily as much as he can get. [facility] also uses marijuana occasionally. Sourav Hyde says that his cocaine addiction has ruined his life. [facility] has lost his relationship with his wife and other family members, he is unable to keep a job or have any finances, he is homeless and has legal issues. [facility] has been to shelter multiple times for theft and currently is on probation. [facility] says his longest period of sobriety was 4 years during which he was incarcerated. [facility] has been to rehab once with the [facility] in 2011.      Family History     No family history on file. Psychiatric Family History    Parents with a history of cocaine use when the patient was younger. [facility] says they are now sober. Social History   Patient is currently homeless. [facility] is  but  from his wife. [facility] has 2 daughters who are 21years old with 2 different women.  Patient says he went as far as the 10th grade but had a learning disability and had to take \"Resource LD classes\". Martina Katy says they kept promoting him from 1 class to the next without him actually mastering the material. [facility] reports that he has not had any significant period of employment. 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 [facility] states he has been in intermediate at least 5-7 times in the past. [place] sentence was 4.5 years and all the sentences were due to grand Martine Ocasio is currently on probation. Vitals/Labs      Vitals:    01/26/21 1349 01/27/21 0424 01/27/21 0630 01/27/21 1223   BP: (!) 158/106 (!) 145/88 126/74    Pulse: 75 78 74    Resp: 18 16     Temp: 98.9 °F (37.2 °C) 97.7 °F (36.5 °C) 98.8 °F (37.1 °C)    SpO2: 98% 95%     Weight:   73.9 kg (163 lb)    Height:   6' 2\" (1.88 m) 6' 2\" (1.88 m)       Labs:   Results for orders placed or performed during the hospital encounter of 01/26/21   COVID-19 RAPID TEST   Result Value Ref Range    Specimen source Nasopharyngeal      COVID-19 rapid test Not detected NOTD     CBC WITH AUTOMATED DIFF   Result Value Ref Range    WBC 6.4 4.6 - 13.2 K/uL    RBC 5.53 (H) 4.70 - 5.50 M/uL    HGB 16.8 (H) 13.0 - 16.0 g/dL    HCT 48.3 (H) 36.0 - 48.0 %    MCV 87.3 74.0 - 97.0 FL    MCH 30.4 24.0 - 34.0 PG    MCHC 34.8 31.0 - 37.0 g/dL    RDW 13.2 11.6 - 14.5 %    PLATELET 396 676 - 150 K/uL    MPV 9.6 9.2 - 11.8 FL    NEUTROPHILS 65 40 - 73 %    LYMPHOCYTES 27 21 - 52 %    MONOCYTES 6 3 - 10 %    EOSINOPHILS 2 0 - 5 %    BASOPHILS 0 0 - 2 %    ABS. NEUTROPHILS 4.1 1.8 - 8.0 K/UL    ABS. LYMPHOCYTES 1.7 0.9 - 3.6 K/UL    ABS. MONOCYTES 0.4 0.05 - 1.2 K/UL    ABS. EOSINOPHILS 0.1 0.0 - 0.4 K/UL    ABS. BASOPHILS 0.0 0.0 - 0.1 K/UL    DF AUTOMATED     METABOLIC PANEL, COMPREHENSIVE   Result Value Ref Range    Sodium 138 136 - 145 mmol/L    Potassium 4.4 3.5 - 5.5 mmol/L    Chloride 103 100 - 111 mmol/L    CO2 32 21 - 32 mmol/L    Anion gap 3 3.0 - 18 mmol/L    Glucose 86 74 - 99 mg/dL    BUN 17 7.0 - 18 MG/DL    Creatinine 1.37 (H) 0.6 - 1.3 MG/DL    BUN/Creatinine ratio 12 12 - 20      GFR est AA >60 >60 ml/min/1.73m2    GFR est non-AA 57 (L) >60 ml/min/1.73m2    Calcium 9.4 8.5 - 10.1 MG/DL    Bilirubin, total 0.9 0.2 - 1.0 MG/DL    ALT (SGPT) 22 16 - 61 U/L    AST (SGOT) 21 10 - 38 U/L    Alk.  phosphatase 103 45 - 117 U/L    Protein, total 8.2 6.4 - 8.2 g/dL    Albumin 4.3 3.4 - 5.0 g/dL    Globulin 3.9 2.0 - 4.0 g/dL    A-G Ratio 1.1 0.8 - 1.7     LIPASE   Result Value Ref Range    Lipase 214 73 - 393 U/L   URINALYSIS W/ RFLX MICROSCOPIC   Result Value Ref Range    Color DARK YELLOW      Appearance CLEAR      Specific gravity 1.029 1.005 - 1.030      pH (UA) 5.5 5.0 - 8.0      Protein Negative NEG mg/dL    Glucose Negative NEG mg/dL    Ketone TRACE (A) NEG mg/dL    Bilirubin Negative NEG      Blood Negative NEG      Urobilinogen 1.0 0.2 - 1.0 EU/dL    Nitrites Negative NEG      Leukocyte Esterase SMALL (A) NEG     DRUG SCREEN, URINE   Result Value Ref Range    BENZODIAZEPINES Negative NEG      BARBITURATES Negative NEG      THC (TH-CANNABINOL) Positive (A) NEG      OPIATES Negative NEG      PCP(PHENCYCLIDINE) Negative NEG      COCAINE Positive (A) NEG      AMPHETAMINES Positive (A) NEG      METHADONE Negative NEG      HDSCOM (NOTE)    ETHYL ALCOHOL   Result Value Ref Range    ALCOHOL(ETHYL),SERUM <3 0 - 3 MG/DL   URINE MICROSCOPIC ONLY   Result Value Ref Range    WBC 4 to 11 0 - 4 /hpf    RBC Negative 0 - 5 /hpf    Epithelial cells 1+ 0 - 5 /lpf    Bacteria FEW (A) NEG /hpf    Mucus 2+ (A) NEG /lpf   SARS-COV-2   Result Value Ref Range    SARS-CoV-2 Please find results under separate order         Mental Status Examination     Appearance/Hygiene 41 y.o. BLACK OR  male  Hygiene: Good   Behavior/Social Relatedness  pleasant, appropriate   Musculoskeletal Gait/Station: appropriate  Tone (flaccid, cogwheeling, spastic): Normal  Psychomotor (hyperkinetic, hypokinetic): calm  Involuntary movements (tics, dyskinesias, akathisa, stereotypies): none   Speech   Rate, rhythm, volume, fluency and articulation are appropriate   Mood   euthymic   Affect    congruent   Thought Process Linear and goal directed, associations are tight    Vagueness, incoherence, circumstantiality, tangentiality, neologisms, perseveration, flight of ideas, or self-contradictory statements not present on assessment   Thought Content and Perceptual Disturbances Denies delusions, ideas of reference, overvalued ideas, ruminations, obsession, compulsions, and phobias    Denies self-injurious behavior (SIB), suicidal ideation (SI), aggressive behavior or homicidal ideation (HI)    Denies auditory and visual hallucinations   Sensorium and Cognition  AOx4, attention intact, memory intact, language use appropriate, and fund of knowledge age appropriate   Insight  Limited   Judgment  Limited       Suicide Risk Assessment     Admission  Date/Time: 01/27/21    [x] Admission  [] Discharge     Patient is deemed to be at a low acute risk of suicide at this time. He denies suicidal ideations and denies any history of prior suicide attempt. His mood is euthymic with bright affect. Assessment and Plan     Psychiatric Diagnoses:   Patient Active Problem List   Diagnosis Code    Cocaine use disorder, severe, dependence ([facility] F14.20    Depressive disorder F32.9       Level of impairment/disability: Moderate    Carlos Reed is a 41 y.o. who is currently requiring acute stabilization to help with cocaine addiction    1.  Admit to locked inpatient behavioral health unit. Start milieu, group, art and occupation therapy. 2. Start Wellbutrin  mg daily for depressive disorder. 3. Routine labs ordered and reviewed by this provider. 4. Reviewed instructions, risks, benefits and side effects. 5. Start disposition planning; referrals to be made to residential treatment facilities. 6. Tentative date of discharge: 2-3 days.        Tangela Horton MD  Psychiatrist  [facility]

## 2021-01-27 NOTE — BSMART NOTE
OCCUPATIONAL THERAPY PROGRESS NOTE Group time:1530 The patient declined group. In bed. Not feeling well.

## 2021-01-27 NOTE — GROUP NOTE
JUANIS  GROUP DOCUMENTATION INDIVIDUAL Group Therapy Note Date: 1/27/2021 Group Start Time: 0800 Group End Time: 9236 Group Topic: Nursing SO [facility] 1 ADULT CHEM DEP Lucho OLIVAREZ  GROUP DOCUMENTATION GROUP Group Therapy Note Attendees: 4 Attendance: Attended Patient's Goal:  Unit Guidelines Interventions/techniques: Informed Follows Directions: Followed directions Interactions: Interacted appropriately Mental Status: Calm Behavior/appearance: Cooperative Goals Achieved: Able to manage/cope with feelings Vega Verdugo

## 2021-01-27 NOTE — CONSULTS
Comprehensive Nutrition Assessment    Type and Reason for Visit: Initial, Positive nutrition screen, Consult    Nutrition Recommendations/Plan:   - Continue regular diet with Ensure Enlive, TID  - Monitor diet tolerance and encourage po intake as tolerated. Nutrition Assessment:  C/o nausea this morning, RN aware. Did not eat breakfast but able to conume a few bites of lunch. Provided with Ensure at lunchtime, pt requesting and feels he will consume it    Malnutrition Assessment:  Malnutrition Status: At risk for malnutrition (specify)(weight loss PTA, decreased appetite & cocaine use disorder)      Nutrition History and Allergies: PMHx- bipolar disorder & cocaine abuse. Presented to ED with c/o SI x week. N/V x few days PTA with decreased appetite. Reported UBW of 200# wt weight loss, unknown time frame (wt of 163# upon admission). -37#, 18%. NKFA    Estimated Daily Nutrient Needs:  Energy (kcal): 6673-7512; Weight Used for Energy Requirements: Current(74 kg)  Protein (g): 59-89; Weight Used for Protein Requirements: Current(0.8-1.2)  Fluid (ml/day): 3603-9327; Method Used for Fluid Requirements: 1 ml/kcal      Nutrition Related Findings:  Nausea      Wounds:    None       Current Nutrition Therapies:  DIET REGULAR  DIET NUTRITIONAL SUPPLEMENTS All Meals; Ensure Enlive    Anthropometric Measures:  · Height:  6' 2\" (188 cm)  · Current Body Wt:  73.9 kg (163 lb)   · Admission Body Wt:  163 lb    · Usual Body Wt:  90.7 kg (200 lb)(pt reported)     · Ideal Body Wt:  190 lbs:  85.8 %   · BMI Category:  Normal weight (BMI 18.5-24. 9)       Nutrition Diagnosis:   · Unintended weight loss related to psychological cause or life stress, inadequate protein-energy intake(cocaine use disorder) as evidenced by (-37#, 18% wt loss (unknown time frame))    Nutrition Interventions:   Food and/or Nutrient Delivery: Continue current diet, Continue oral nutrition supplement  Nutrition Education and Counseling: Education not indicated  Coordination of Nutrition Care: Continue to monitor while inpatient    Goals:  PO nutrition intake will meet >75% of patient estimated nutritional needs within the next 7 days.        Nutrition Monitoring and Evaluation:   Behavioral-Environmental Outcomes: None identified  Food/Nutrient Intake Outcomes: Diet advancement/tolerance, Food and nutrient intake, Supplement intake  Physical Signs/Symptoms Outcomes: Biochemical data, Nausea/vomiting, Meal time behavior, Nutrition focused physical findings, Weight    Discharge Planning:    Continue oral nutrition supplement, Continue current diet     Electronically signed by Michele Lomas RD on 1/27/2021 at 12:31 PM    Contact: [phone]

## 2021-01-27 NOTE — ED NOTES
TRANSFER - OUT REPORT:    Verbal report given to [facility] RN(name) on Donny Cyr  being transferred to behavioural(unit) for routine progression of care       Report consisted of patients Situation, Background, Assessment and   Recommendations(SBAR). Information from the following report(s) SBAR, Kardex, ED Summary, Intake/Output, MAR, Accordion and Recent Results was reviewed with the receiving nurse. Lines:       Opportunity for questions and clarification was provided.       Patient transported with:   Registered Nurse

## 2021-01-28 LAB
BACTERIA SPEC CULT: NORMAL
SERVICE CMNT-IMP: NORMAL

## 2021-01-28 PROCEDURE — 65220000001 HC RM PRIVATE PSYCH

## 2021-01-28 PROCEDURE — 74011250637 HC RX REV CODE- 250/637: Performed by: PSYCHIATRY & NEUROLOGY

## 2021-01-28 PROCEDURE — 74011250637 HC RX REV CODE- 250/637: Performed by: NURSE PRACTITIONER

## 2021-01-28 PROCEDURE — 99231 SBSQ HOSP IP/OBS SF/LOW 25: CPT | Performed by: PSYCHIATRY & NEUROLOGY

## 2021-01-28 RX ORDER — ACETAMINOPHEN 500 MG
2 TABLET ORAL
Status: DISCONTINUED | OUTPATIENT
Start: 2021-01-28 | End: 2021-01-29 | Stop reason: HOSPADM

## 2021-01-28 RX ADMIN — BUPROPION HYDROCHLORIDE 150 MG: 150 TABLET, FILM COATED, EXTENDED RELEASE ORAL at 08:35

## 2021-01-28 RX ADMIN — NICOTINE POLACRILEX 2 MG: 2 GUM, CHEWING ORAL at 16:39

## 2021-01-28 RX ADMIN — PANTOPRAZOLE SODIUM 40 MG: 40 TABLET, DELAYED RELEASE ORAL at 08:35

## 2021-01-28 RX ADMIN — NICOTINE POLACRILEX 2 MG: 2 GUM, CHEWING ORAL at 21:34

## 2021-01-28 RX ADMIN — TRAZODONE HYDROCHLORIDE 50 MG: 50 TABLET ORAL at 21:34

## 2021-01-28 NOTE — PROGRESS NOTES
[address] [facility] [address]  Inpatient Progress Note     Date of Service: 01/28/21  Hospital Day: 1     Subjective/Interval History   01/28/21    Treatment Team Notes:  Notes reviewed and/or discussed and report that Jazmín Erickson is a 25-year-old male with history of cocaine use disorder admitted for suicidal ideation. No acute events overnight. Patient attended some groups yesterday. Patient interview: Jazmín Erickson was interviewed by this writer today. Patient states he is still having some abdominal pain and has some nausea this morning but he was able to keep down most of his food. Other than abdominal pain, his mood is euthymic today. Supportive therapy was provided related to his cocaine use. He was encouraged to comply with reasons for quitting and to develop some motivation to staying sober. The patient said that his daughters and his health and his motivation. Patient also encouraged to think about his future and said some long-term and short-term goals. Objective     Visit Vitals  BP (!) 140/96 (BP 1 Location: Right arm, BP Patient Position: Sitting)   Pulse 90   Temp 97.4 °F (36.3 °C)   Resp 20   Ht 6' 2\" (1.88 m)   Wt 73.9 kg (163 lb)   SpO2 95%   BMI 20.93 kg/m²       No results found for this or any previous visit (from the past 24 hour(s)). Mental Status Examination     Appearance/Hygiene 41 y.o.  BLACK OR  male  Hygiene: Fair   Behavior/Social Relatedness Appropriate   Musculoskeletal Gait/Station: appropriate  Tone (flaccid, cogwheeling, spastic): Normal  Psychomotor (hyperkinetic, hypokinetic): calm   Involuntary movements (tics, dyskinesias, akathisa, stereotypies): none   Speech   Rate, rhythm, volume, fluency and articulation are appropriate   Mood   euthymic   Affect    congruent   Thought Process Linear and goal directed   Thought Content and Perceptual Disturbances Denies self-injurious behavior (SIB), suicidal ideation (SI), aggressive behavior or homicidal ideation (HI)    Denies auditory and visual hallucinations   Sensorium and Cognition  Grossly intact   Insight  fair   Judgment  fair        Assessment/Plan      Psychiatric Diagnoses:   Patient Active Problem List   Diagnosis Code    Cocaine use disorder, severe, dependence ([facility] F14.20    Depressive disorder F32.9       Esperanza Galindo is a 41 y.o. who is currently admitted for suicidal ideation and requesting rehab treatment for cocaine use disorder. 1.  Continue Wellbutrin  mg daily for mood. 2.     to refer to long-term rehab facility.     Marlon Salomon MD  [facility]  Psychiatry

## 2021-01-28 NOTE — BSMART NOTE
History: Ramses Arzola is a 41 y.o. male with hx of asthma, bipolar d/o, cocaine abuse who presents with complaint of suicidal ideation x 1 week without a plan. Pt notes last use of cocaine was last night. Pt also notes lower abdominal pain associated with n/v x 1 day. Denies fever or chills, chest pain, dyspnea, cough, diarrhea, dysuria, hematuria. Denies sick contacts, known exposure to [employer]. Denies visual or auditory hallucinations, homicidal ideation, alcohol use. SW made contact with patient as he remains somewhat isolated in the milieu. Patient is polite and calm as he addressed the need for treatment. Patient reports the need for long term care and reports he is prepared and focused compared tot he several times he has requested treatment and did not comply. SW will continue with supportive counseling and will assist with discharge as needed.  
 
ELEONORA Ambrosio

## 2021-01-28 NOTE — PROGRESS NOTES
Problem: Suicide  Goal: *STG: Remains safe in hospital  Description: Pt will verbally contract for safety daily while hospitalized. Outcome: Progressing Towards Goal  Goal: *STG: Seeks staff when feelings of self harm or harm towards others arise  Description: Patient will be able to seek staff when feelings/thoughts of self harm or harming others arise daily and as needed. Outcome: Progressing Towards Goal  Goal: *STG/LTG: Complies with medication therapy  Description: Will take scheduled medications daily as ordered during her hospital stay under direct supervision. Outcome: Progressing Towards Goal     Ramses Arzola is a 41 y.o. Male that presented today as flat, withdrawn, but cooperative with staff. Ramses Arzola has been compliant with all medications, has been able to eat some of his meals, and has been refusing all groups, despite staff's encouragement/redirection. RN's will initiate, develop, implement, review, and revise treatment plans as necessary. Will continue to provide education, redirection, and support as needed or verbalized by patient.    Signed By: Hector Pelaez RN     January 28, 2021

## 2021-01-28 NOTE — BH NOTES
Group Therapy Note    Patient: Esperanza Galindo    Group Type: Medication Management    Group Time: 15 minutes    Method used: Directed discussion    Attendance: Esperanza Galindo was      non-compliant with group and did not participate for Less than 50% of the duration. Interaction Narrative: Esperanza Galindo had a Anxious mood, was Withdrawn during group and Pablo Gaitan thought content was Preoccupied. Writer Challenged patient by encouraging him to participate in his groups and treatment in general. Patient was not Able to engage in interactions and stated he felt \"a little under the weather. \" Will continue to monitor and provide redirection, education, and support as needed.

## 2021-01-28 NOTE — BSMART NOTE
SOCIAL WORK GROUP THERAPY PROGRESS NOTE Group Time:  1pm 
 
Group Topic:  Coping Skills Group Participation:  
 
Pt reportedly did not attend group due to medical issues & some pain monitored by nursing staff.

## 2021-01-28 NOTE — GROUP NOTE
[facility] GROUP DOCUMENTATION INDIVIDUAL 
 
 
                                                                    Group Therapy Note 
 
Date: 1/27/2021 
 
Group Start Time: 2000 
Group End Time: 2015 
Group Topic: Medication 
 
[facility] SPECIAL TRTMT 1 
 
Jorge Gomez RN 
 
[facility] GROUP DOCUMENTATION GROUP 
 
Group Therapy Note 
 
Attendees: 6 
 
  
 
Attendance: Attended 
 
Patient's Goal:  Understanding the importance of taking medication. 
 
Interventions/techniques: Informed 
 
Follows Directions: Followed directions 
 
Interactions: Interacted appropriately 
 
Mental Status: Calm 
 
Behavior/appearance: Cooperative 
 
Goals Achieved: Able to listen to others 
 
 
Additional Notes:  0 
 
Jorge Gomez RN

## 2021-01-28 NOTE — BSMART NOTE
OCCUPATIONAL THERAPY PROGRESS NOTE Group Time:  1400 Attendance: The patient attended 3/4 of group. The patient left and returned to activity at least once. Participation: The patient participated with minimal elaboration in the activity. . 
Attention: The patient was able to focus on the activity. Interaction: The patient occasionally  interacts with others. Stated he would just listen. Late in group started a preaching type interaction with peers and reminded to focus on self and use \"I\" statements. Patient comments generalized and not receptive to changes suggested.

## 2021-01-29 VITALS
SYSTOLIC BLOOD PRESSURE: 124 MMHG | HEIGHT: 74 IN | BODY MASS INDEX: 20.92 KG/M2 | DIASTOLIC BLOOD PRESSURE: 86 MMHG | HEART RATE: 64 BPM | WEIGHT: 163 LBS | OXYGEN SATURATION: 95 % | TEMPERATURE: 97.6 F | RESPIRATION RATE: 18 BRPM

## 2021-01-29 PROBLEM — F32.A DEPRESSIVE DISORDER: Status: RESOLVED | Noted: 2020-03-29 | Resolved: 2021-01-29

## 2021-01-29 PROCEDURE — 99239 HOSP IP/OBS DSCHRG MGMT >30: CPT | Performed by: PSYCHIATRY & NEUROLOGY

## 2021-01-29 PROCEDURE — 74011250637 HC RX REV CODE- 250/637: Performed by: NURSE PRACTITIONER

## 2021-01-29 PROCEDURE — 74011250637 HC RX REV CODE- 250/637: Performed by: PSYCHIATRY & NEUROLOGY

## 2021-01-29 RX ADMIN — PANTOPRAZOLE SODIUM 40 MG: 40 TABLET, DELAYED RELEASE ORAL at 08:20

## 2021-01-29 RX ADMIN — BUPROPION HYDROCHLORIDE 150 MG: 150 TABLET, FILM COATED, EXTENDED RELEASE ORAL at 08:20

## 2021-01-29 NOTE — DISCHARGE SUMMARY
[facility]  Inpatient Psychiatry   Discharge Summary     Admit date: 1/26/2021    Discharge date and time: 1/29/2021 11:29 AM    Discharge Physician: Yoli Chapa MD    DISCHARGE DIAGNOSES     Psychiatric Diagnoses:   Patient Active Problem List   Diagnosis Code    [facility]aine use disorder, severe, [facility] F14.20       Level of impairment/disability:  None    HOSPITAL COURSE   Juan Cheng is a 41 y.o. BLACK OR  male with a history of cocaine use disorder and depressive disorder who was admitted from the ED voluntarily for suicidal ideation without a plan. Patient is well-known to this facility and has a history of malingering. He tends to endorse suicidal ideations in the ED when he wants to be admitted to the unit. Patient reports that he has been using cocaine on a daily basis. He relapsed 2 weeks after his last hospitalization here in April. He says he tried to follow-up with the [facility] but he could not follow-up due to not having certain documents such as ID. He says he also tried to go to the [facility] residential treatment program but was not accepted. He says he came to the hospital to seek help with his cocaine addiction because he is tired of using cocaine. He is using at least $50 worth of cocaine on a daily basis and he reported to the crisis nurse that he used $300-$400 worth of cocaine two nights ago. Patient denies depressed mood at this time. He denies suicidal ideation. He is complaining of nausea and decreasing appetite. Also poor sleep due to cocaine use. He says he has lost weight in the past months. He wants to be referred to a 30-day residential treatment facility.     The patient's UDS was positive for amphetamines, cocaine and marijuana. He denies use of amphetamines but endorses daily use of marijuana as well. He denies use of alcohol, heroine or other recreational drugs besides cocaine and marijuana.     The patient had a brief and uneventful hospital course. Once admitted to the unit, the patient denied he was suicidal.  His mood was euthymic and his affect was bright. Initially, he said he was interested in going to a 30-day rehab facilities but then quickly stated that he did not want to do that anymore and wanted to return to his sister's house. Supportive psychotherapy and motivational interviewing was used to help patient find the motivation to stop using cocaine. Patient presented with little motivation to stop using. He did not have any relapse prevention plan. He was encouraged to consider attending Narcotics Anonymous meetings. No SI, HI, psychosis or wilbert at time of discharge. DISPOSITION/FOLLOW-UP     Disposition: Sister's house    Follow-up Appointments: Follow-up Information     Follow up With Specialties Details Why Contact Info    None    None ([phone]) Patient stated that they have no PCP          Patient is also referred to the [facility] is currently taking appointment via telephone due to [address]  Patient can complete phone interview on 2/1/21 @ 9:00am to initiate services  Jennifer Treadwell, Noemí Mckinney. MEDICATION CHANGES   Outpatient medications:  No current facility-administered medications on file prior to encounter. No current outpatient medications on file prior to encounter. Medications discontinued during hospitalization:  Medications Discontinued During This Encounter   Medication Reason    buPROPion XL (WELLBUTRIN XL) 150 mg tablet Discontinued at Discharge         Discharged medication:  Discharge Medication List as of 1/29/2021 10:49 AM      STOP taking these medications       buPROPion XL (WELLBUTRIN XL) 150 mg tablet Comments:   Reason for Stopping:               Instructions, risks (black box warning), benefits and side effects (EPS, TD, NMS) were discussed in detail prior to discharge.   Patient denied any adverse medication side effects prior to discharge. LABS/IMAGING DURING ADMISSION     Results for orders placed or performed during the hospital encounter of 01/26/21   CULTURE, URINE    Specimen: Clean catch; Urine   Result Value Ref Range    Special Requests: NO SPECIAL REQUESTS      Culture result: No growth (<1,000 CFU/ML)     COVID-19 RAPID TEST   Result Value Ref Range    Specimen source Nasopharyngeal      COVID-19 rapid test Not detected NOTD     CBC WITH AUTOMATED DIFF   Result Value Ref Range    WBC 6.4 4.6 - 13.2 K/uL    RBC 5.53 (H) 4.70 - 5.50 M/uL    HGB 16.8 (H) 13.0 - 16.0 g/dL    HCT 48.3 (H) 36.0 - 48.0 %    MCV 87.3 74.0 - 97.0 FL    MCH 30.4 24.0 - 34.0 PG    MCHC 34.8 31.0 - 37.0 g/dL    RDW 13.2 11.6 - 14.5 %    PLATELET 516 334 - 898 K/uL    MPV 9.6 9.2 - 11.8 FL    NEUTROPHILS 65 40 - 73 %    LYMPHOCYTES 27 21 - 52 %    MONOCYTES 6 3 - 10 %    EOSINOPHILS 2 0 - 5 %    BASOPHILS 0 0 - 2 %    ABS. NEUTROPHILS 4.1 1.8 - 8.0 K/UL    ABS. LYMPHOCYTES 1.7 0.9 - 3.6 K/UL    ABS. MONOCYTES 0.4 0.05 - 1.2 K/UL    ABS. EOSINOPHILS 0.1 0.0 - 0.4 K/UL    ABS. BASOPHILS 0.0 0.0 - 0.1 K/UL    DF AUTOMATED     METABOLIC PANEL, COMPREHENSIVE   Result Value Ref Range    Sodium 138 136 - 145 mmol/L    Potassium 4.4 3.5 - 5.5 mmol/L    Chloride 103 100 - 111 mmol/L    CO2 32 21 - 32 mmol/L    Anion gap 3 3.0 - 18 mmol/L    Glucose 86 74 - 99 mg/dL    BUN 17 7.0 - 18 MG/DL    Creatinine 1.37 (H) 0.6 - 1.3 MG/DL    BUN/Creatinine ratio 12 12 - 20      GFR est AA >60 >60 ml/min/1.73m2    GFR est non-AA 57 (L) >60 ml/min/1.73m2    Calcium 9.4 8.5 - 10.1 MG/DL    Bilirubin, total 0.9 0.2 - 1.0 MG/DL    ALT (SGPT) 22 16 - 61 U/L    AST (SGOT) 21 10 - 38 U/L    Alk.  phosphatase 103 45 - 117 U/L    Protein, total 8.2 6.4 - 8.2 g/dL    Albumin 4.3 3.4 - 5.0 g/dL    Globulin 3.9 2.0 - 4.0 g/dL    A-G Ratio 1.1 0.8 - 1.7     LIPASE   Result Value Ref Range    Lipase 214 73 - 393 U/L   URINALYSIS W/ RFLX MICROSCOPIC   Result Value Ref Range    Color DARK YELLOW      Appearance CLEAR      Specific gravity 1.029 1.005 - 1.030      pH (UA) 5.5 5.0 - 8.0      Protein Negative NEG mg/dL    Glucose Negative NEG mg/dL    Ketone TRACE (A) NEG mg/dL    Bilirubin Negative NEG      Blood Negative NEG      Urobilinogen 1.0 0.2 - 1.0 EU/dL    Nitrites Negative NEG      Leukocyte Esterase SMALL (A) NEG     DRUG SCREEN, URINE   Result Value Ref Range    BENZODIAZEPINES Negative NEG      BARBITURATES Negative NEG      THC (TH-CANNABINOL) Positive (A) NEG      OPIATES Negative NEG      PCP(PHENCYCLIDINE) Negative NEG      COCAINE Positive (A) NEG      AMPHETAMINES Positive (A) NEG      METHADONE Negative NEG      HDSCOM (NOTE)    ETHYL ALCOHOL   Result Value Ref Range    ALCOHOL(ETHYL),SERUM <3 0 - 3 MG/DL   URINE MICROSCOPIC ONLY   Result Value Ref Range    WBC 4 to 11 0 - 4 /hpf    RBC Negative 0 - 5 /hpf    Epithelial cells 1+ 0 - 5 /lpf    Bacteria FEW (A) NEG /hpf    Mucus 2+ (A) NEG /lpf   SARS-COV-2   Result Value Ref Range    SARS-CoV-2 Please find results under separate order          DISCHARGE MENTAL STATUS EVALUATION     Appearance/Hygiene 41 y.o.  BLACK OR  male  Hygiene: Good   Attitude/Behavior/Social Relatedness Appropriate   Musculoskeletal Gait/Station: appropriate  Tone (flaccid, cogwheeling, spastic): not assessed  Psychomotor (hyperkinetic, hypokinetic): calm  Involuntary movements (tics, dyskinesias, akathisa, stereotypies): none   Speech   Rate, rhythm, volume, fluency and articulation are appropriate   Mood   euthymic   Affect    congruent   Thought Process Linear and goal directed   Thought Content and Perceptual Disturbances Denies self-injurious behavior (SIB), suicidal ideation (SI), aggressive behavior or homicidal ideation (HI)    Denies auditory and visual hallucinations   Sensorium and Cognition  Grossly intact   Insight  Limited   Judgment  Limited       SUICIDE RISK ASSESSMENT     [] Admission  [x] Discharge Patient is deemed to be at a low acute risk of suicide. His mood is euthymic. His affect is bright. He denies any history of suicide attempts. Patient states that he usually says he is suicidal when he comes to the ED just so that he can get admitted to the facility and that he is never really suicidal.      Completion of discharge was greater than 30 minutes. Over 50% of today's discharge was geared towards counseling and coordination of care.           Meredith Ellsworth MD  Psychiatry  [facility] [place]

## 2021-01-29 NOTE — DISCHARGE INSTRUCTIONS
Patient Education        Depression and Chronic Disease: Care Instructions  Your Care Instructions     A chronic disease is one that you have for a long time. Some chronic diseases can be controlled, but they usually cannot be cured. Depression is common in people with chronic diseases, but it often goes unnoticed. Many people have concerns about seeking treatment for a mental health problem. You may think it's a sign of weakness, or you don't want people to know about it. It's important to overcome these reasons for not seeking treatment. Treating depression or anxiety is good for your health. Follow-up care is a key part of your treatment and safety. Be sure to make and go to all appointments, and call your doctor if you are having problems. It's also a good idea to know your test results and keep a list of the medicines you take. How can you care for yourself at home? Watch for symptoms of depression  The symptoms of depression are often subtle at first. You may think they are caused by your disease rather than depression. Or you may think it is normal to be depressed when you have a chronic disease. If you are depressed you may:  · Feel sad or hopeless. · Feel guilty or worthless. · Not enjoy the things you used to enjoy. · Feel hopeless, as though life is not worth living. · Have trouble thinking or remembering. · Have low energy, and you may not eat or sleep well. · Pull away from others. · Think often about death or killing yourself. (Keep the numbers for these national suicide hotlines: 6-601-709-TALK [1-973.390.5350] and 0-535-GBVTZJP [1-214.684.2688]. )  Get treatment  By treating your depression, you can feel more hopeful and have more energy. If you feel better, you may take better care of yourself, so your health may improve. · Talk to your doctor if you have any changes in mood during treatment for your disease. · Ask your doctor for help.  Counseling, antidepressant medicine, or a combination of the two can help most people with depression. Often a combination works best. Counseling can also help you cope with having a chronic disease. When should you call for help? Call 911 anytime you think you may need emergency care. For example, call if:    · You feel like hurting yourself or someone else.     · Someone you know has depression and is about to attempt or is attempting suicide. Call your doctor now or seek immediate medical care if:    · You hear voices.     · Someone you know has depression and:  ? Starts to give away his or her possessions. ? Uses illegal drugs or drinks alcohol heavily. ? Talks or writes about death, including writing suicide notes or talking about guns, knives, or pills. ? Starts to spend a lot of time alone. ? Acts very aggressively or suddenly appears calm. Watch closely for changes in your health, and be sure to contact your doctor if:    · You do not get better as expected. Where can you learn more? Go to http://www.gray.com/  Enter A548 in the search box to learn more about \"Depression and Chronic Disease: Care Instructions. \"  Current as of: January 31, 2020               Content Version: 12.6  © 1473-1940 [employer]. Care instructions adapted under license by [employer] (which disclaims liability or warranty for this information). If you have questions about a medical condition or this instruction, always ask your healthcare professional. [place] [address] any warranty or liability for your use of this information. Patient Education        Recovering From Depression: Care Instructions  Your Care Instructions     Taking good care of yourself is important as you recover from depression. In time, your symptoms will fade as your treatment takes hold. Do not give up. Instead, focus your energy on getting better. Your mood will improve. It just takes some time.  Focus on things that can help you feel better, such as being with friends and family, eating well, and getting enough rest. But take things slowly. Do not do too much too soon. You will begin to feel better gradually. Follow-up care is a key part of your treatment and safety. Be sure to make and go to all appointments, and call your doctor if you are having problems. It's also a good idea to know your test results and keep a list of the medicines you take. How can you care for yourself at home? Be realistic  · If you have a large task to do, break it up into smaller steps you can handle, and just do what you can. · You may want to put off important decisions until your depression has lifted. If you have plans that will have a major impact on your life, such as marriage, divorce, or a job change, try to wait a bit. Talk it over with friends and loved ones who can help you look at the overall picture first.  · Reaching out to people for help is important. Do not isolate yourself. Let your family and friends help you. Find someone you can trust and confide in, and talk to that person. · Be patient, and be kind to yourself. Remember that depression is not your fault and is not something you can overcome with willpower alone. Treatment is important for depression, just like for any other illness. Feeling better takes time, and your mood will improve little by little. Stay active  · Stay busy and get outside. Take a walk, or try some other light exercise. · Talk with your doctor about an exercise program. Exercise can help with mild depression. · Go to a movie or concert. Take part in a [employer] activity or other social gathering. Go to a ball game. · Ask a friend to have dinner with you. Take care of yourself  · Eat a balanced diet with plenty of fresh fruits and vegetables, whole grains, and lean protein. If you have lost your appetite, eat small snacks rather than large meals.   · Avoid using illegal drugs or marijuana and drinking alcohol. Do not take medicines that have not been prescribed for you. They may interfere with medicines you may be taking for depression, or they may make your depression worse. · Take your medicines exactly as they are prescribed. You may start to feel better within 1 to 3 weeks of taking antidepressant medicine. But it can take as many as 6 to 8 weeks to see more improvement. If you have questions or concerns about your medicines, or if you do not notice any improvement by 3 weeks, talk to your doctor. · Continue to take your medicine after your symptoms improve. Taking your medicine for at least 6 months after you feel better can help keep you from getting depressed again. If this isn't the first time you have been depressed, your doctor may recommend you to take medicine even longer. · If you have any side effects from your medicine, tell your doctor. Many side effects are mild and will go away on their own after you have been taking the medicine for a few weeks. Some may last longer. Talk to your doctor if side effects are bothering you too much. You might be able to try a different medicine. · Continue counseling. It may help prevent depression from returning, especially if you've had multiple episodes of depression. Talk with your counselor if you are having a hard time attending your sessions or you think the sessions aren't working. Don't just stop going. · Get enough sleep. Talk to your doctor if you are having problems sleeping. · Avoid sleeping pills unless they are prescribed by the doctor treating your depression. Sleeping pills may make you groggy during the day, and they may interact with other medicine you are taking. · If you have any other illnesses, such as diabetes, heart disease, or high blood pressure, make sure to continue with your treatment. Tell your doctor about all of the medicines you take, including those with or without a prescription.   · If you or someone you know talks about suicide, self-harm, or feeling hopeless, get help right away. Call the [facility] at 7-066-663-EZWC (5-369.570.1829) or text HOME to 623558 to access the Crisis Text Line. Consider saving these numbers in your phone. When should you call for help? Call 911 anytime you think you may need emergency care. For example, call if:    · You feel like hurting yourself or someone else.     · Someone you know has depression and is about to attempt or is attempting suicide. Call your doctor now or seek immediate medical care if:    · You hear voices.     · Someone you know has depression and:  ? Starts to give away his or her possessions. ? Uses illegal drugs or drinks alcohol heavily. ? Talks or writes about death, including writing suicide notes or talking about guns, knives, or pills. ? Starts to spend a lot of time alone. ? Acts very aggressively or suddenly appears calm. Watch closely for changes in your health, and be sure to contact your doctor if:    · You do not get better as expected. Where can you learn more? Go to http://www.gray.com/  Enter N529 in the search box to learn more about \"Recovering From Depression: Care Instructions. \"  Current as of: January 31, 2020               Content Version: 12.6  © 8797-1818 [employer]. Care instructions adapted under license by [employer] (which disclaims liability or warranty for this information). If you have questions about a medical condition or this instruction, always ask your healthcare professional. [place] [address] any warranty or liability for your use of this information. Patient Education        Depression Treatment: Care Instructions  Your Care Instructions     Depression is a condition that affects the way you feel, think, and act.  It causes symptoms such as low energy, loss of interest in daily activities, and sadness or grouchiness that goes on for a long time. Depression is very common and affects men and women of all ages. Depression is a medical illness caused by changes in the natural chemicals in your brain. It is not a character flaw, and it does not mean that you are a bad or weak person. It does not mean that you are going crazy. It is important to know that depression can be treated. Medicines, counseling, and self-care can all help. Many people do not get help because they are embarrassed or think that they will get over the depression on their own. But some people do not get better without treatment. Follow-up care is a key part of your treatment and safety. Be sure to make and go to all appointments, and call your doctor if you are having problems. It's also a good idea to know your test results and keep a list of the medicines you take. How can you care for yourself at home? Learn about antidepressant medicines  Antidepressant medicines can improve or end the symptoms of depression. You may need to take the medicine for at least 6 months, and often longer. Keep taking your medicine even if you feel better. If you stop taking it too soon, your symptoms may come back or get worse. You may start to feel better within 1 to 3 weeks of taking antidepressant medicine. But it can take as many as 6 to 8 weeks to see more improvement. Talk to your doctor if you have problems with your medicine or if you do not notice any improvement after 3 weeks. Antidepressants can make you feel tired, dizzy, or nervous. Some people have dry mouth, constipation, headaches, sexual problems, an upset stomach, or diarrhea. Many of these side effects are mild and go away on their own after you take the medicine for a few weeks. Some may last longer. Talk to your doctor if side effects bother you too much. You might be able to try a different medicine. If you are pregnant or breastfeeding, talk to your doctor about what medicines you can take.   Learn about counseling  In many cases, counseling can work as well as medicines to treat mild to moderate depression. Counseling is done by licensed mental health providers, such as psychologists, social workers, and some types of nurses. It can be done in one-on-one sessions or in a group setting. Many people find group sessions helpful. Cognitive-behavioral therapy is a type of counseling. In this treatment therapy, you learn how to see and change unhelpful thinking styles that may be adding to your depression. Counseling and medicines often work well when used together. Here are other things you could try to help with depression:  · Get regular exercise. It may help you feel better. · Plan something pleasant for yourself every day. Include activities that you have enjoyed in the past.  · Get enough sleep. Talk to your doctor if you have problems sleeping. · Eat a balanced diet. If you do not feel hungry, eat small snacks rather than large meals. · Avoid using illegal drugs or marijuana and drinking alcohol. Do not take medicines that have not been prescribed for you. They may interfere with your treatment, or they may make your depression worse. · Spend time with family and friends. It may help to speak openly about your depression with people you trust.  · Take your medicines exactly as prescribed. Call your doctor if you think you are having a problem with your medicine. · Do not make major life decisions while you are depressed. Depression may change the way you think. You will be able to make better decisions after you feel better. · Think positively. Challenge negative thoughts with statements such as \"I am hopeful\"; \"Things will get better\"; and \"I can ask for the help I need. \" Write down these statements and read them often, even if you don't believe them yet. · Be patient with yourself. It took time for your depression to develop, and it will take time for your symptoms to improve.  Do not take on too much or be too hard on yourself. · Learn all you can about depression from written and online materials. · Check out behavioral health classes to learn more about dealing with depression. · If you or someone you know talks about suicide, self-harm, or feeling hopeless, get help right away. Call the [address] [place] [address] at 4-765-810-JFXH (0-982.786.6340) or text HOME to 763005 to access the Crisis Text Line. Consider saving these numbers in your phone. When should you call for help? Call 911 anytime you think you may need emergency care. For example, call if:    · You feel you cannot stop from hurting yourself or someone else. Call your doctor now or seek immediate medical care if:    · You hear voices.     · You feel much more depressed. Watch closely for changes in your health, and be sure to contact your doctor if:    · You are having problems with your depression medicine.     · You are not getting better as expected. Where can you learn more? Go to http://www.gray.com/  Enter G693 in the search box to learn more about \"Depression Treatment: Care Instructions. \"  Current as of: January 31, 2020               Content Version: 12.6  © 4064-1554 [employer]. Care instructions adapted under license by [employer] (which disclaims liability or warranty for this information). If you have questions about a medical condition or this instruction, always ask your healthcare professional. [place] [address] any warranty or liability for your use of this information.

## 2021-01-29 NOTE — BH NOTES
Discharge Note                Upon discharge, patient presented as Stable and denies Anxious, Depressed and Worried. Patient received discharge instructions and verbalized understanding. Writer also offered patient the flu immunization and education, to which the patient said No (due to patient said already received) and so the flu immunization was not administered. All patient belongings have been returned:  Dental Appliance: Dental Appliances: None  Vision: Visual Aid: None  Hearing Aid:    Jewelry: Jewelry: Watch(Watch face cracked)  Clothing: Clothing: Belt, Footwear, Arlene park, Jacket/Coat, Shirt, Shorts, Socks  Other Valuables: Other Valuables: Cell Phone, [address] [place] [address] Avenue, Personal electronic devices (comment), Pocket knife, Wallet, Other (comment)(Tablet-Locker,Cell phone screen cracked)  Valuables sent to safe: Personal Items Sent to Safe: Pocket knife/file-Red/Silver  Patient armband removed and shredded, and was escorted out of facility, ambulatory, with staff.

## 2021-01-29 NOTE — GROUP NOTE
[facility] GROUP DOCUMENTATION INDIVIDUAL Group Therapy Note Date: 1/28/2021 Group Start Time: 2000 Group End Time: 2030 Group Topic: Nursing [address] 1 ADULT CHEM DEP Alana Downing RN 
 
[facility] GROUP DOCUMENTATION GROUP Group Therapy Note Attendees: 9 Attendance: Attended Patient's Goal:  Understanding medication and reflection. Interventions/techniques: Informed, Validated, Provide feedback and Reinforced Follows Directions: Followed directions Interactions: Interacted appropriately Mental Status: Flat Behavior/appearance: Cooperative Goals Achieved: Able to reflect/comment on own behavior and Able to receive feedback Additional Notes:  Patient was pleasant upon approach. Patient guarded with verbalizing how his day had been. Patient was educated on medication being provided. Patient refused scheduled medication stating, \"I'm good. \"  Patient was given further education on benefits of medication and refused it still. Patient then requested PRN medication to assist with sleep and for nicotine withdrawal symptoms.   
 
Luis Antonio Kaba RN

## 2021-01-29 NOTE — SUICIDE SAFETY PLAN
SAFETY PLAN    A suicide Safety Plan is a document that supports someone when they are having thoughts of suicide. Warning Signs that indicate a suicidal crisis may be developing: What (situations, thoughts, feelings, body sensations, behaviors, etc.) do you experience that lets you know you are beginning to think about suicide? 1. relaxation    Internal Coping Strategies:  What things can I do (relaxation techniques, hobbies, physical activities, etc.) to take my mind off my problems without contacting another person? 1. Good Behaviors    People and social settings that provide distraction: Who can I call or where can I go to distract me? People whom I can ask for help: Who can I call when I need help - for example, friends, family, clergy, someone else? Professionals or [address] [place], [place] [address]vd I can contact during a crisis: Who can I call for help - for example, my doctor, my psychiatrist, my psychologist, a mental health provider, a suicide hotline? 3. Suicide Prevention Lifeline: 5-582-185-TALK (9654)    4. [address] [place] [address] Emergency Services -  for example, [facility] suicide hotline, [facility] Hotline: [place]    Making the environment safe: How can I make my environment (house/apartment/living space) safer? For example, can I remove guns, medications, and other items?   1. Love for all

## 2021-01-29 NOTE — BSMART NOTE
LATE ENTRY NOTE 1/29/2021 ART THERAPY GROUP PROGRESS NOTE Group time: 930 The patient did not awaken/get up when called for group despite encouragement.

## 2021-01-29 NOTE — BH NOTES
Pt appeared to have slept for 6 hours thus far. Pt smiled and stated, \"I'm leaving today, Ms. Mari. I'm going back home to my family. They're my support system. I've been gone for too long\". Will continue to monitor for safety.

## 2021-01-29 NOTE — BH NOTES
Patient presented himself as being engaging, cooperative, and respectful when approached. Patient is notably assertive when it comes to expressing his needs and concerns with both staff and other patients as observed throughout this shift. Patient spent most of the shift out in the milieu watching TV, attended today's CBT group without being prompted. Patient was proven to have been insightful during group, and expressed personal understanding of the subject matter. Patient will continue to be monitored by staff for any change in mood and behavior. No other issues to report.

## 2021-12-17 NOTE — PROGRESS NOTES
Chief complaint:   Chief Complaint   Patient presents with   • Physical     c/o wieght gain stressed out and tired and hot flashes all the time    • Referral     for hearing evalulation        Vitals:  Visit Vitals  /70 (Cuff Size: Regular)   Pulse 72   Temp 97.8 °F (36.6 °C)   Resp 16   Ht 5' 2.5\" (1.588 m)   Wt 59.4 kg (131 lb)   LMP 08/13/2019 (Approximate)   SpO2 98%   BMI 23.58 kg/m²       HISTORY OF PRESENT ILLNESS     HPI   Patient presents for annual physical.    Patient is concerned about where her weight is.  She is feeling better knowing her weight is similar to last time.  Patient has been very stressed out right now and getting hot flashes.  Feels stress bring this on.  Her last period was about 1 year ago.  Had one last bit of bleeding when she visited her daughter about a year ago.  She doesn't feel she needs to go on anything for anxiety right now.      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       Allergies:  ALLERGIES:  No Known Allergies    Past Medical  History/Surgeries:  No past medical history on file.    No past surgical history on file.    Family History:  Family History   Problem Relation Age of Onset   • Diabetes Mother    • Hypertension Mother        Social History:  Social History     Tobacco Use   • Smoking status: Never Smoker   • Smokeless tobacco: Never Used   Substance Use Topics   • Alcohol use: Not on file       REVIEW OF SYSTEMS     Review of Systems  As above.    PHYSICAL EXAM     Physical Exam    GENERAL:   Famotidine was therapeutically interchanged for Ranitidine per the P&T Committee approved Therapeutic Interchanges Policy. Well developed and well nourished.  Grooming appropriate.    Visit Vitals  /70 (Cuff Size: Regular)   Pulse 72   Temp 97.8 °F (36.6 °C)   Resp 16   Ht 5' 2.5\" (1.588 m)   Wt 59.4 kg (131 lb)   LMP 08/13/2019 (Approximate)   SpO2 98%   BMI 23.58 kg/m²     HENT:  Head normocephalic, atraumatic. Tympanic  membranes and external auditory canals are normal.  Nasopharyngeal mucosa  is normal.  Oropharyngeal mucosa is normal.  EYES: Extraocular movements  intact.  Pupils are equally responsive and reactive to light. Conjunctivae  pink.  Sclerae anicteric.  NECK: Trachea midline.  No thyromegaly or masses  LYMPH:  No supraclavicular or cervical adenopathy.  LUNGS:  Lung fields are clear to auscultation and percussion.  No chest tenderness to palpation.  No respiratory distress.  HEART:  Regular rate and rhythm.  No murmur, rub, gallop, or click.  No carotid bruits or JVD.  No edema in lower extremities bilaterally.  Distal pulses intact.  ABDOMEN:  Soft and nontender.   No rebound, rigidity, or guarding.  MUSCULOSKELETAL:  Normal gait, station, and muscle tone.  Neck supple with full range of motion.  Full range of motion of upper and lower extremities bilaterally.  5/5 strength in upper and lower extremities bilaterally.  SKIN:  Warm and dry.  No erythema, paleness, or rash.  NEUROLOGIC: No cranial nerve deficits.  Sensation intact.    PSYCH: Oriented to person, place, and time.  No acute distress.  Mood and affect appropriate.  Judgment and thought content are normal.    ASSESSMENT/PLAN     ASSESSMENT: Annual physical exam  (primary encounter diagnosis)  Physical completed  2 tablespoons freshly ground flax seeds    Impacted cerumen of left ear  Plan: SERVICE TO ENT    Bilateral hearing loss, unspecified hearing loss type  Plan: SERVICE TO AUDIOLOGY    Encounter for vitamin deficiency screening  Plan: VITAMIN B12, VITAMIN D -25 HYDROXY    Screening, anemia, deficiency, iron  Plan: CBC WITH DIFFERENTIAL,  FERRITIN    Screening for diabetes mellitus  Plan: COMPREHENSIVE METABOLIC PANEL, GLYCOHEMOGLOBIN    Screening for thyroid disorder  Plan: FREE T3, FREE T4, THYROID STIMULATING HORMONE    Screening for lipid disorders  Plan: LIPID PANEL WITH REFLEX    Need for vaccination  Plan: ZOSTER SHINGLES RECOMBINANT ADJUVANTED         IM(SHINGRIX)      Will call with lab results.

## 2022-03-19 PROBLEM — F14.20 COCAINE USE DISORDER, SEVERE, DEPENDENCE (HCC): Status: ACTIVE | Noted: 2019-10-08

## 2022-10-12 NOTE — PROGRESS NOTES
[address] [facility] [address]  Inpatient Progress Note     Date of Service: 10/09/19  Hospital Day: 1     Subjective/Interval History   10/09/19    Treatment Team Notes:  Notes reviewed and/or discussed and report that Alejandro Woodward is a 38 y/o male admitted for SI in the setting of Cocaine use. No behavior issues overnight. Pt has been pleasant on the milieu today, feeling much better compared to yesterday. Patient interview: Alejandro Woodward was interviewed by this writer today. Pt says he is feeling great today. \"I just wanted to get a day or two clean before I go see my family. \" Pt denies SI or hallucinations. Says he just said he was suicidal in Ed so he could get admitted because he needed to get some rest from the streets. He plans to go meet with his family and is relying on them to find him a rehab facility to go to or come up with a sobriety plan for him. He says he will gladly go to a 28 day rehab program if one could be found for him bu he does not have any insurance. Pt denies depressed mood or feeling sad. He doesn't feel that he has mental issues and says his problems are due to his Cocaine addiction. Nevertheless reports feeling more stable when he takes Depakote and will like to continue taking in the outpatient setting. He slept well last night, has good appetite and energy level. No more abdominal pain today. Objective     Visit Vitals  BP (!) 139/91 (BP 1 Location: Right arm, BP Patient Position: Sitting)   Pulse 63   Temp 98 °F (36.7 °C)   Resp 16   Ht 6' 2\" (1.88 m)   Wt 81.6 kg (180 lb)   SpO2 98%   BMI 23.11 kg/m²       No results found for this or any previous visit (from the past 24 hour(s)). Mental Status Examination     Appearance/Hygiene 37 y.o.  BLACK OR  male  Hygiene: good   Behavior/Social Relatedness Appropriate   Musculoskeletal Gait/Station: appropriate  Tone (flaccid, cogwheeling, spastic): not assessed  Psychomotor (hyperkinetic, hypokinetic): calm   Involuntary movements (tics, dyskinesias, akathisa, stereotypies): none   Speech   Rate, rhythm, volume, fluency and articulation are appropriate   Mood   euthymic   Affect    congruent   Thought Process Linear and goal directed   Thought Content and Perceptual Disturbances Denies self-injurious behavior (SIB), suicidal ideation (SI), aggressive behavior or homicidal ideation (HI)    Denies auditory and visual hallucinations   Sensorium and Cognition  Grossly intact   Insight  fair   Judgment fair        Assessment/Plan      Psychiatric Diagnoses:   Patient Active Problem List   Diagnosis Code    Bipolar I disorder, current or most recent episode depressed, with psychotic features ([facility] [address] F31.5    Cocaine use disorder, severe, dependence ([address] F14.20       Psychosocial and contextual factors: unmeployment, chronic addiction    Level of impairment/disability: severe    1. Continue current medications  2. Reviewed instructions, risks, benefits and side effects of medications  3.  to discuss options regarding long term rehab. 4. Possible discharge before the weekend.     Laine Irizarry MD DR. [facility]  Psychiatry no fever

## 2023-02-10 NOTE — ED NOTES
Patient in bed, talking on phone, watching tv and in stable condition. No signs of distress. Will continue to monitor. Respiratory

## 2023-06-22 NOTE — GROUP NOTE
JUANIS  GROUP DOCUMENTATION INDIVIDUAL Group Therapy Note Date: 3/29/2020 Group Start Time: 1300 Group End Time: 9920 Group Topic: Nursing [facility] ADULT CHEM DEP Carlos Eduardo OLIVAREZ  GROUP DOCUMENTATION GROUP Group Therapy Note Attendees: 5 Attendance: Attended Patient's Goal:  Hand Hygiene and Corona Prevention Interventions/techniques: Informed Follows Directions: Followed directions Interactions: Interacted appropriately Mental Status: Calm Behavior/appearance: Cooperative Goals Achieved: Able to listen to others Suni Master [Dear  ___] : Dear  [unfilled], [Consult Letter:] : I had the pleasure of evaluating your patient, [unfilled]. [Please see my note below.] : Please see my note below. [Consult Closing:] : Thank you very much for allowing me to participate in the care of this patient.  If you have any questions, please do not hesitate to contact me. [Sincerely,] : Sincerely, [FreeTextEntry3] : Patrice Aiken M.D.\par

## 2023-11-15 ENCOUNTER — HOSPITAL ENCOUNTER (INPATIENT)
Facility: HOSPITAL | Age: 43
LOS: 4 days | Discharge: HOME | DRG: 751 | End: 2023-11-20
Attending: STUDENT IN AN ORGANIZED HEALTH CARE EDUCATION/TRAINING PROGRAM | Admitting: PSYCHIATRY & NEUROLOGY
Payer: MEDICAID

## 2023-11-15 DIAGNOSIS — R45.851 SUICIDAL IDEATION: Primary | ICD-10-CM

## 2023-11-15 DIAGNOSIS — F14.10 COCAINE ABUSE (HCC): ICD-10-CM

## 2023-11-15 LAB
AMPHET UR QL SCN: NEGATIVE
ANION GAP SERPL CALC-SCNC: 4 MMOL/L (ref 3–18)
BARBITURATES UR QL SCN: NEGATIVE
BASOPHILS # BLD: 0.1 K/UL (ref 0–0.1)
BASOPHILS NFR BLD: 1 % (ref 0–2)
BENZODIAZ UR QL: NEGATIVE
BUN SERPL-MCNC: 15 MG/DL (ref 7–18)
BUN/CREAT SERPL: 11 (ref 12–20)
CALCIUM SERPL-MCNC: 9 MG/DL (ref 8.5–10.1)
CANNABINOIDS UR QL SCN: NEGATIVE
CHLORIDE SERPL-SCNC: 107 MMOL/L (ref 100–111)
CO2 SERPL-SCNC: 29 MMOL/L (ref 21–32)
COCAINE UR QL SCN: POSITIVE
CREAT SERPL-MCNC: 1.42 MG/DL (ref 0.6–1.3)
DIFFERENTIAL METHOD BLD: ABNORMAL
EOSINOPHIL # BLD: 0.5 K/UL (ref 0–0.4)
EOSINOPHIL NFR BLD: 6 % (ref 0–5)
ERYTHROCYTE [DISTWIDTH] IN BLOOD BY AUTOMATED COUNT: 13.3 % (ref 11.6–14.5)
ETHANOL SERPL-MCNC: <3 MG/DL (ref 0–3)
FLUAV RNA SPEC QL NAA+PROBE: NOT DETECTED
FLUBV RNA SPEC QL NAA+PROBE: NOT DETECTED
GLUCOSE SERPL-MCNC: 100 MG/DL (ref 74–99)
HCT VFR BLD AUTO: 47.2 % (ref 36–48)
HGB BLD-MCNC: 15.8 G/DL (ref 13–16)
IMM GRANULOCYTES # BLD AUTO: 0 K/UL (ref 0–0.04)
IMM GRANULOCYTES NFR BLD AUTO: 0 % (ref 0–0.5)
LYMPHOCYTES # BLD: 2.5 K/UL (ref 0.9–3.6)
LYMPHOCYTES NFR BLD: 28 % (ref 21–52)
Lab: ABNORMAL
MCH RBC QN AUTO: 29.4 PG (ref 24–34)
MCHC RBC AUTO-ENTMCNC: 33.5 G/DL (ref 31–37)
MCV RBC AUTO: 87.7 FL (ref 78–100)
METHADONE UR QL: NEGATIVE
MONOCYTES # BLD: 0.6 K/UL (ref 0.05–1.2)
MONOCYTES NFR BLD: 7 % (ref 3–10)
NEUTS SEG # BLD: 5.2 K/UL (ref 1.8–8)
NEUTS SEG NFR BLD: 59 % (ref 40–73)
NRBC # BLD: 0 K/UL (ref 0–0.01)
NRBC BLD-RTO: 0 PER 100 WBC
OPIATES UR QL: NEGATIVE
PCP UR QL: NEGATIVE
PLATELET # BLD AUTO: 304 K/UL (ref 135–420)
PMV BLD AUTO: 9.9 FL (ref 9.2–11.8)
POTASSIUM SERPL-SCNC: 4.3 MMOL/L (ref 3.5–5.5)
RBC # BLD AUTO: 5.38 M/UL (ref 4.35–5.65)
SARS-COV-2 RNA RESP QL NAA+PROBE: NOT DETECTED
SODIUM SERPL-SCNC: 140 MMOL/L (ref 136–145)
WBC # BLD AUTO: 8.8 K/UL (ref 4.6–13.2)

## 2023-11-15 PROCEDURE — 99285 EMERGENCY DEPT VISIT HI MDM: CPT

## 2023-11-15 PROCEDURE — 80048 BASIC METABOLIC PNL TOTAL CA: CPT

## 2023-11-15 PROCEDURE — 6370000000 HC RX 637 (ALT 250 FOR IP): Performed by: EMERGENCY MEDICINE

## 2023-11-15 PROCEDURE — 85025 COMPLETE CBC W/AUTO DIFF WBC: CPT

## 2023-11-15 PROCEDURE — 82077 ASSAY SPEC XCP UR&BREATH IA: CPT

## 2023-11-15 PROCEDURE — 80307 DRUG TEST PRSMV CHEM ANLYZR: CPT

## 2023-11-15 PROCEDURE — 87636 SARSCOV2 & INF A&B AMP PRB: CPT

## 2023-11-15 RX ORDER — LANOLIN ALCOHOL/MO/W.PET/CERES
3 CREAM (GRAM) TOPICAL NIGHTLY PRN
Status: DISCONTINUED | OUTPATIENT
Start: 2023-11-15 | End: 2023-11-16

## 2023-11-15 RX ORDER — FAMOTIDINE 20 MG/1
20 TABLET, FILM COATED ORAL ONCE
Status: COMPLETED | OUTPATIENT
Start: 2023-11-15 | End: 2023-11-15

## 2023-11-15 RX ADMIN — FAMOTIDINE 20 MG: 20 TABLET, FILM COATED ORAL at 22:34

## 2023-11-15 RX ADMIN — Medication 3 MG: at 22:34

## 2023-11-15 NOTE — BSMART NOTE
Behavioral Health Crisis Assessment    Chief Complaint: Suicidal and Addiction          Voluntary or Involuntary Status: voluntarily      C-SSRS current suicide Risk (High, Moderate, Low): High      Past Suicide Attempts (specify):  Patient reports 2 past suicide attempts via \"took some pills and cut myself\". Self Injurious/Self Mutilation behaviors (specify): Patient reports past self injurious behavioral via \"cut\" himself in past.      Protective Factors (specify): Patient reports his sister is supportive. Risk Factors (specify): Patient reports substance use and homelessness. Substance use (current or past): (See Below)    Alcohol: Beer   Date started: age 21  Route: oral  Frequency: twice weekly  Amount: 1-2   Last use: 11-13-23  Withdrawals:   Rehab/Inpatient Services: [facility]   Hx of seizures: yes   Hx of blackouts: yes      Heroin: Denies   Date started:  Route:  Frequency:  Amount:  Last use: Withdrawals:   Rehab/Inpatient Services:      Cocaine:   Date started:   Route: Smoke  Frequency: Twice monthly  Amount:$ 100.00 weekly  Last use: 11/14/23  Withdrawals: Denies  Rehab/Inpatient Services:[facility]     Marijuana: age 10. Patient denies current use. Prescription/OTC Medications: Depakote, Trazodone, Remeron, and Clonidine (reports noncompliance). Hallucinogenics:Denies      Cigarettes/Cigars/Vapors: Smokes a pack of cigarettes daily         MH & Substance use Treatment  (current and/or past): Patient reports receiving mental health treatment with [facility] and [facility]. Patient reports he has received substance use treatment with [facility]. Violence towards others (current and/or past:(specify): Patient denies violence towards others. Legal issues (current or past): Patient reports past charges of \"grand larceny and khan larceny\".  Patient reports he is currently on

## 2023-11-15 NOTE — ED TRIAGE NOTES
Pt to triage c/o addiction problem. States he relapsed and has been snorting cocaine and abusing alcohol. Reports las drink and drug use as today. Also reports SI with vague plan overdose, \"there are so many ways I could do this but I want help\". Denies HI, AH, or VH.

## 2023-11-15 NOTE — ED NOTES
Pt aox4. Pt calm and cooperative. Pt lying comfortably in bed near nurses station. Sitter at bedside for 1:1 direct patient safety for observation.       Rita Gray RN  11/15/23 7318

## 2023-11-15 NOTE — ED PROVIDER NOTES
EMERGENCY DEPARTMENT HISTORY AND PHYSICAL EXAM      Date: 11/15/2023  Patient Name: Lul Martines    History of Presenting Illness     Chief Complaint   Patient presents with    Addiction Problem       History (Context): Lul Martines is a 43 y.o. male  presents to the ED today with chief complaint of suicidal ideation with plan and detox.  Patient states that he is been smoking \"crack\" and believes he is having issues because of this.  Further endorses suicidal ideation with plan to hurt himself in multiple different ways including overdose as well as physical harm.  Denies any homicidal ideation or hallucinations.  States that he feels overall unwell with general malaise at this time.  States that he has not been taking his psychiatric medications/medications due to his \"overall mental state.\"  Denies any further symptoms including fever, chills, chest pain, or dyspnea at this time.      PCP: No primary care provider on file.    No current facility-administered medications for this encounter.     No current outpatient medications on file.       Past History     Past Medical History:   Past Medical History:   Diagnosis Date    Acid reflux     Asthma     Bipolar disorder, unspecified (HCC) 3/1/2019    Cocaine use disorder, severe, dependence (HCC)        Past Surgical History:  History reviewed. No pertinent surgical history.    Family History:  History reviewed. No pertinent family history.    Social History:   Social History     Tobacco Use    Smoking status: Every Day     Packs/day: 1     Types: Cigarettes    Smokeless tobacco: Never   Substance Use Topics    Alcohol use: Not Currently    Drug use: Yes     Types: Cocaine       Allergies:  No Known Allergies      Physical Exam     Vitals:    11/15/23 1456 11/15/23 1711   BP: 125/76 131/72   Pulse: 83 81   Resp: 16 16   Temp: 98.9 °F (37.2 °C) (!) 96.7 °F (35.9 °C)   TempSrc: Oral Axillary   SpO2: 98% 99%   Weight: 72.6 kg (160 lb)    Height: 1.88 m (6' 2\")   components within normal limits   URINE DRUG SCREEN - Abnormal; Notable for the following components:    Cocaine, Urine Positive (*)     All other components within normal limits   COVID-19 & INFLUENZA COMBO   ETHANOL       Radiologic Studies -   No orders to display         The laboratory results, imaging results, and other diagnostic exams were reviewed in the EMR. Medical Decision Making   I am the first provider for this patient. I reviewed the vital signs, available nursing notes, past medical history, past surgical history, family history and social history. Vital Signs-Reviewed the patient's vital signs. Records Reviewed: Personally, on initial evaluation    MDM:   DDX includes but is not limited to: Suicidal ideation, polysubstance abuse, cocaine abuse, psychiatric disorder    Patient overall well-appearing, no acute distress, vital signs grossly within normal limits. Will obtain medical screening lab work and consult crisis for further evaluation and recommendation. We will continue to monitor and evaluate patient while in the emergency department. Orders as below:  Orders Placed This Encounter   Procedures    COVID-19 & Influenza Combo    CBC with Auto Differential    BMP    ETOH    Urine Drug Screen    ADULT DIET; Regular; Safety Tray; Safety Tray (Disposables No Utensils)    IP CONSULT TO BSMART    Suicide precautions          ED Course:   ED Course as of 11/15/23 1750   Wed Nov 15, 2023   1642 Patients lab work sniffing for creatinine 1.42 which is slightly elevated when compared to his baseline of around 1.2, otherwise labs gross within normal limits at this time. Patient positive for cocaine. Spoke with crisis about possible need for escalation inpatient mission. They will evaluate patient for further disposition. [DV]   0839 5:40 PM : Pt care transferred to Dr. Deysi Linda  ,ED provider.  History of patient complaint(s), available diagnostic reports and current treatment plan

## 2023-11-15 NOTE — DISCHARGE INSTRUCTIONS
BEHAVIORAL HEALTH NURSING DISCHARGE NOTE      The following personal items collected during your admission are returned to you:   Dental Appliance:    Vision:    Hearing Aid:    Jewelry:    Clothing:    Other Valuables:    Valuables sent to safe:        PATIENT INSTRUCTIONS:             The discharge information has been reviewed with the  patient . The  Patient   verbalized understanding.         Patient armband shredded

## 2023-11-16 PROBLEM — F14.20 COCAINE USE DISORDER, SEVERE, DEPENDENCE (HCC): Status: ACTIVE | Noted: 2019-10-08

## 2023-11-16 PROBLEM — F33.2 SEVERE RECURRENT MAJOR DEPRESSION WITHOUT PSYCHOTIC FEATURES (HCC): Status: ACTIVE | Noted: 2023-11-16

## 2023-11-16 PROBLEM — F32.A DEPRESSION: Status: ACTIVE | Noted: 2023-11-16

## 2023-11-16 LAB
EST. AVERAGE GLUCOSE BLD GHB EST-MCNC: 108 MG/DL
GLUCOSE BLD STRIP.AUTO-MCNC: 105 MG/DL (ref 70–110)
GLUCOSE BLD STRIP.AUTO-MCNC: 128 MG/DL (ref 70–110)
HBA1C MFR BLD: 5.4 % (ref 4.2–5.6)

## 2023-11-16 PROCEDURE — 83036 HEMOGLOBIN GLYCOSYLATED A1C: CPT

## 2023-11-16 PROCEDURE — 1240000000 HC EMOTIONAL WELLNESS R&B

## 2023-11-16 PROCEDURE — 36415 COLL VENOUS BLD VENIPUNCTURE: CPT

## 2023-11-16 PROCEDURE — 6370000000 HC RX 637 (ALT 250 FOR IP): Performed by: PSYCHIATRY & NEUROLOGY

## 2023-11-16 PROCEDURE — 99222 1ST HOSP IP/OBS MODERATE 55: CPT | Performed by: PSYCHIATRY & NEUROLOGY

## 2023-11-16 PROCEDURE — 6370000000 HC RX 637 (ALT 250 FOR IP): Performed by: EMERGENCY MEDICINE

## 2023-11-16 PROCEDURE — 82962 GLUCOSE BLOOD TEST: CPT

## 2023-11-16 RX ORDER — ACETAMINOPHEN 325 MG/1
650 TABLET ORAL EVERY 6 HOURS PRN
Status: DISCONTINUED | OUTPATIENT
Start: 2023-11-16 | End: 2023-11-20 | Stop reason: HOSPADM

## 2023-11-16 RX ORDER — PANTOPRAZOLE SODIUM 40 MG/1
40 TABLET, DELAYED RELEASE ORAL
Status: DISCONTINUED | OUTPATIENT
Start: 2023-11-17 | End: 2023-11-20 | Stop reason: HOSPADM

## 2023-11-16 RX ORDER — INSULIN LISPRO 100 [IU]/ML
0-4 INJECTION, SOLUTION INTRAVENOUS; SUBCUTANEOUS NIGHTLY
Status: DISCONTINUED | OUTPATIENT
Start: 2023-11-16 | End: 2023-11-18

## 2023-11-16 RX ORDER — BENZONATATE 100 MG/1
100 CAPSULE ORAL
Status: COMPLETED | OUTPATIENT
Start: 2023-11-16 | End: 2023-11-16

## 2023-11-16 RX ORDER — INSULIN LISPRO 100 [IU]/ML
0-4 INJECTION, SOLUTION INTRAVENOUS; SUBCUTANEOUS
Status: DISCONTINUED | OUTPATIENT
Start: 2023-11-16 | End: 2023-11-18

## 2023-11-16 RX ORDER — TRAZODONE HYDROCHLORIDE 50 MG/1
50 TABLET ORAL NIGHTLY PRN
Status: DISCONTINUED | OUTPATIENT
Start: 2023-11-16 | End: 2023-11-20 | Stop reason: HOSPADM

## 2023-11-16 RX ORDER — ONDANSETRON 4 MG/1
4 TABLET, FILM COATED ORAL EVERY 8 HOURS PRN
Status: DISCONTINUED | OUTPATIENT
Start: 2023-11-16 | End: 2023-11-20 | Stop reason: HOSPADM

## 2023-11-16 RX ORDER — HYDROXYZINE PAMOATE 50 MG/1
50 CAPSULE ORAL EVERY 6 HOURS PRN
Status: DISCONTINUED | OUTPATIENT
Start: 2023-11-16 | End: 2023-11-20 | Stop reason: HOSPADM

## 2023-11-16 RX ORDER — DEXTROSE MONOHYDRATE 100 MG/ML
INJECTION, SOLUTION INTRAVENOUS CONTINUOUS PRN
Status: DISCONTINUED | OUTPATIENT
Start: 2023-11-16 | End: 2023-11-18

## 2023-11-16 RX ORDER — CITALOPRAM 20 MG/1
10 TABLET ORAL DAILY
Status: DISCONTINUED | OUTPATIENT
Start: 2023-11-16 | End: 2023-11-18

## 2023-11-16 RX ORDER — GLUCAGON 1 MG/ML
1 KIT INJECTION PRN
Status: DISCONTINUED | OUTPATIENT
Start: 2023-11-16 | End: 2023-11-18

## 2023-11-16 RX ADMIN — ONDANSETRON 4 MG: 4 TABLET, FILM COATED ORAL at 18:24

## 2023-11-16 RX ADMIN — TRAZODONE HYDROCHLORIDE 50 MG: 50 TABLET ORAL at 20:17

## 2023-11-16 RX ADMIN — BENZONATATE 100 MG: 100 CAPSULE ORAL at 06:05

## 2023-11-16 RX ADMIN — CITALOPRAM HYDROBROMIDE 10 MG: 20 TABLET ORAL at 13:55

## 2023-11-16 ASSESSMENT — SLEEP AND FATIGUE QUESTIONNAIRES
SLEEP PATTERN: INSOMNIA
DO YOU USE A SLEEP AID: YES
DO YOU HAVE DIFFICULTY SLEEPING: YES
AVERAGE NUMBER OF SLEEP HOURS: 2

## 2023-11-16 ASSESSMENT — LIFESTYLE VARIABLES
HOW OFTEN DO YOU HAVE A DRINK CONTAINING ALCOHOL: 2-4 TIMES A MONTH
HOW MANY STANDARD DRINKS CONTAINING ALCOHOL DO YOU HAVE ON A TYPICAL DAY: 1 OR 2

## 2023-11-16 NOTE — GROUP NOTE
Group Therapy Note    Date: 11/16/2023    Group Start Time: 1500  Group End Time: 1186  Group Topic: Recreational        Yelena Griffith        Group Therapy Note    Attendees: 7/13      Group Type: Jeopardy      Group Focus: Recreational therapy group engaged patients through a group game. RT used topics that focused on self-care, mental health, and emotions. The group can assist in increasing positive mood, social and problem-solving skills, and reduce stress and anxiety. Notes:  Patient reported his is grateful for being alive and pushing forward . Patient expressed he deals with drug use but continues to try to get clean, and he is here now to get clean because he has a job lined up at [employer]. When discussing the emotion anger, patient stated he shut down and distance hisself from everyone when he is angry. Patient provided encourage and support to other group members. Status After Intervention:  Unchanged    Participation Level:  Active Listener and Interactive    Participation Quality: Appropriate, Attentive, Sharing, and Supportive      Speech:  normal      Thought Process/Content: Logical      Affective Functioning: Congruent      Mood: euthymic      Level of consciousness:  Alert and Attentive      Response to Learning: Able to verbalize current knowledge/experience      Endings: None Reported    Modes of Intervention: Support, Socialization, Problem-solving, and Activity      Recreational Therapist  Trice Jeffries

## 2023-11-16 NOTE — BSMART NOTE
SOCIAL WORK GROUP THERAPY  NOTE           Date: November 16, 2023     Group Time:  12:00 PM      Group Ended 12:45      [facility]     Group Topic: Anger/ Part 2     Group members Participation: 1      Patient Refused to Participate in Group session today but was encouraged to Contribute. Group Session: Anger Management is a system of psychological therapeutic technique that teaches  patients that have excessive or uncontrollable anger and aggression problems. Foundationally,  how to reduce the triggers, and effects of an individual without control  of their anger and emotional stated.       Chasity Avendano, , MSW, Supervisee

## 2023-11-16 NOTE — BH NOTE
RAGINI NOTE: The pt arrived to unit via wheelchair escorted by hospital security at 9:20. The pt was checked in and given documents about his rights/rule of the unit. The pt vitals was taken and file. The pt consumed a snacks and lunch while talking with the staff (person writing). The pt was compliant with the medical staff and the social-workers this morning. The pt has a current care-plan and no further information to report at this time.

## 2023-11-16 NOTE — ED NOTES
Received from out going nurse awake and alert in no acute distress sitter in attendance at all times at arms length, please see observation flow sheet.      Lizbeth Pablo RN  11/16/23 5667

## 2023-11-16 NOTE — PROGRESS NOTES
Voluntary admission. Patient admitted to unit via wheelchair escorted by security and ED nurse. Patient arrived in paper scrubs. He presents with a flat affect and congruent mood. Patient denies SI/HI and AVH. Patient states he was suicidal but he is here for detox. Patient states he relapsed on crack cocaine on 11/14. Patient endorses smoking cigarettes and drinking beers and liquor. He complains of foot pain; rating his pain a 6/10. Patient states his foot was ran over years and he never got it treated. Patient has been cooperative and pleasant throughout the admission process. PFM made aware of patient's arrival.  RN will initiate, implement, develop, review, and revise patient's treatment plan.

## 2023-11-16 NOTE — H&P
[facility] HISTORY AND PHYSICAL    Name:  Jason Morales  MR#:   [id number]  :  1980  ACCOUNT #:  [de-identified]  ADMIT DATE:  11/15/2023    Admission date to the behavioral unit is 2023. IDENTIFYING INFORMATION:  The patient is a 40-year-old male, , admitted to this facility voluntarily on the above-mentioned date. BASIS FOR ADMISSION:  The patient presented himself to [facility] Emergency Department with a history of him being suicidal, this resulting from his using drugs again. Specifically, he described the use of crack cocaine, having been using crack on a daily basis and on occasion more than one daily and his becoming increasingly depressed and suicidal.  The patient described then different ways of harming himself by overdosing as well as physical harm. The case was then presented to the physician on-call who admitted the patient to my service. PSYCHIATRIC HISTORY:  The patient is not the best historian, however, by chart review, since  it appears that the patient has had multiple admissions in association due to his history of drug use, and/or he is being depressed. In  to , the patient had a total of 8 visits here at [address] [facility]. [address] [place] [address] related to his use of drugs, specifically crack cocaine, they are mostly obviously in association to the depression that he derived from his drug use and other stressors including his being homeless and he is not having a job or any type of secured income. It is to be mentioned that in many of those hospitalizations, the patient was diagnosed as suffering with a bipolar disorder.   On one occasion bipolar 1 disorder since he described the presence of psychotic symptoms, in one occasion with a bipolar 2 disorder since he only had symptoms of depression with recurrences, however, not found to be psychotic, and on several admissions in which the main diagnosis has been a major

## 2023-11-16 NOTE — PROGRESS NOTES
6:19 AM : Pt care transferred to me from Dr. Dominick Galo  ,ED provider. History of patient complaint(s), available diagnostic reports and current treatment plan has been discussed thoroughly. Bedside rounding on patient occured : No .  Intended disposition of patient : TBD  Pending diagnostics reports and/or labs (please list):   SI with plan. Cocaine abuse. Pt accepted for admission to [facility], awaiting orders    Melanie Monge DO  Emergency Physician  .S.  Acute [facility]

## 2023-11-16 NOTE — H&P
[facility] NOTE FOR BEHAVIORAL HEALTH UNIT    Patient:    Deana House , 37 y.o. male   Room/Bed:  [facility]/  Admission Date:   11/15/2023  Code status:  No Order      Reason for Consult: Medical Management  Psychiatry Attending Requesting Consult: Dr. Roya Garcia    ASSESSMENT:  Deana House is a 37 y.o. male w/ a PMH of cocaine use and depressive disorder presenting for SI who is now admitted to the [facility] Unit. Nurse Chaperone during History and Physical:     PLAN:    SI  -Management per inpatient psychiatry    MART  Cr: 1.42   Plan  - encourage fluid intake       Global  Diet: Regular  Mobility: As tolerated     Thank you for this consult. SUBJECTIVE:   Dr. Roya Garcia has consulted Family Medicine to evaluate Deana House  in the Emergency Department. He  is a 37 y.o. male w/ PMHx of cocaine use disorder and depressive disorder presenting for SI who is now admitted to the [facility] Unit.      ED Course:  -Vitals: VSS  -Labs: CBC, BMP, UDS, UA,   -Imaging: none  -EKG: none  -Meds: trazodone, pantoprazole   -IVF: none  -Procedures: none  -Consults:Psychiatry     CURRENT MEDICATIONS:  Current Facility-Administered Medications   Medication Dose Route Frequency Provider Last Rate Last Admin    hydrOXYzine pamoate (VISTARIL) capsule 50 mg  50 mg Oral Q6H PRN Jaime Ambrose MD        traZODone (DESYREL) tablet 50 mg  50 mg Oral Nightly PRN Jaime Ambrose MD        acetaminophen (TYLENOL) tablet 650 mg  650 mg Oral Q6H PRN Jaime Ambrose MD        citalopram (CELEXA) tablet 10 mg  10 mg Oral Daily MD YAO Foster (positive findings are in BOLD; negative findings are in regular font)  Constitutional: fevers, chills, appetite changes, weight changes, fatigue  HEENT: changes in vision, changes in hearing, sore throat, dysphagia  Cardiovascular: chest pain, palpitations, PND, orthopnea, edema  Pulmonary: SOB, cough,

## 2023-11-16 NOTE — PROGRESS NOTES
SOCIAL WORK GROUP THERAPY PROGRESS NOTE    Group Time:  10:15am    Group Topic:  Coping Skills    Group Participation:     Pt was unavailable for group due to completing Nursing and Art Therapy assessments.

## 2023-11-16 NOTE — PROGRESS NOTES
BH Biopsychosocial Assessment    Current Level of Psychosocial Functioning     [x]Independent  []Dependent  []Minimal Assist      Comments:      Psychosocial High Risk Factors (check all that apply)      []Unable to obtain meds                                                               []Chronic illness/pain    [x]Substance abuse   [x]Lack of Family Support   [x]Financial stress   [x]Isolation   [x]Inadequate Community Resources  [x]Suicide ideation  [x]Not taking medications   []Victim of crime   []Developmental Delay  []Unable to manage personal needs    []Age 72 or older   [x]  Homeless  [x]Phillip transportation   []Readmission within 30 days  [x]Unemployment  []Traumatic Event    Psychiatric Advanced Directive: n/a    Family to involve in treatment: n/a    Sexual Orientation:  not discussed    Patient Strengths: asking for help    Patient Barriers:  SI, depression, anxiety, hx mood shifts, substance abuse, hx non compliance with tx, homeless, financial issues, Legal issues but not specific about them, has PO needs to contact ? CD education provided: in groups & IT, UDS + Cocaine daily, some regular alcohol use. Safety plan: Contract for safety with Tx Team & Charge nurse. CMHC/MH history: numerous admissions to [facility] & couple other with community facilities    Plan of Care:  medication management, group/individual therapies, family meetings, psycho -education, treatment team meetings to assist with stabilization    Initial Discharge Plan:  refer to local CSB if not going to x30 day tx program.    Clinical Summary:  SEE LUIS DUBON    CLINICAL IMPRESSION:  AXIS I:  Major depressive disorder, recurrent without psychotic symptoms. Rule out bipolar 1 disorder, most recent episode depressed without psychosis. Cocaine use disorder, severe. Rule out cocaine-induced mood disorder. AXIS II:  Deferred.   AXIS III:  None currently

## 2023-11-16 NOTE — ED PROVIDER NOTES
7:08 PM :Pt care assumed from Dr. Altagracia Child , ED provider. Pt complaint(s), current treatment plan, progression and available diagnostic results have been discussed thoroughly. The patient was seen and evaluated on my shift. Rounding occurred: No  Intended Disposition: Psychiatric placement  Pending diagnostic reports and/or labs (please list): Patient is suicidal with a plan, recent cocaine abuse. Medically cleared, crisis to see for placement.   He is voluntary     Rodriguez Irving MD  11/15/23 5676

## 2023-11-16 NOTE — ED NOTES
Pt complained of acid reflux and sleep difficulty. Pt informed me of his at home medications, trazodone and pantoprazole. Spoke with MD who prescribed pepcid and melatonin. Administered meds after pt ate full meal. Pt tucked in bed for the night.        Gi Hale RN  11/15/23 2639

## 2023-11-16 NOTE — ED NOTES
Report given to on coming nurse patient moved to room 19 in no acute distress cough improved sitter at bedside.      Caroline Stuart RN  11/16/23 0585

## 2023-11-16 NOTE — PLAN OF CARE
Problem: Self Harm/Suicidality  Goal: Will have no self-injury during hospital stay  Description: INTERVENTIONS:  1. Ensure constant observer at bedside with Q15M safety checks  2. Maintain a safe environment  3. Secure patient belongings  4. Ensure family/visitors adhere to safety recommendations  5. Ensure safety tray has been added to patient's diet order  6. Every shift and PRN: Re-assess suicidal risk via Frequent Screener    Outcome: Progressing     Problem: Depression  Goal: Will be euthymic at discharge  Description: INTERVENTIONS:  1. Administer medication as ordered  2. Provide emotional support via 1:1 interaction with staff  3. Encourage involvement in milieu/groups/activities  4. Monitor for social isolation  Outcome: Progressing     Problem: Behavior  Goal: Pt/Family maintain appropriate behavior and adhere to behavioral management agreement, if implemented  Description: INTERVENTIONS:  1. Assess patient/family's coping skills and  non-compliant behavior (including use of illegal substances)  2. Notify security of behavior or suspected illegal substances which indicate the need for search of the family and/or belongings  3. Encourage verbalization of thoughts and concerns in a socially appropriate manner  4. Utilize positive, consistent limit setting strategies supporting safety of patient, staff and others  5. Encourage participation in the decision making process about the behavioral management agreement  6. If a visitor's behavior poses a threat to safety call refer to organization policy. 7. Initiate consult with , Psychosocial CNS, Spiritual Care as appropriate  Outcome: Progressing   Patient has been oriented to his room, unit, and staff. Patient visible out in milieu. Will continue to provide a safe and therapeutic environment.

## 2023-11-16 NOTE — ED NOTES
Patient complaining of cough given Tessalon pearls as ordered.      Philis Halsted, RN  11/16/23 4248

## 2023-11-16 NOTE — GROUP NOTE
Art Therapy Group Progress Note     PATIENT SCHEDULED FOR GROUP AT:  1:00 PM      GROUP STOP TIME:  1:45 PM       ATTENDANCE: Low (4/13 participants)      TOPIC / FOCUS: Mindful Coloring Practice      GOALS:  define mindfulness, encourage focus, practice identifying emotions, encourage positive coping skills, practice emotional communication skills, promote creativity, encourage art journaling      PARTICIPATION LEVEL:  Lashay  Art Therapist, MA

## 2023-11-16 NOTE — BSMART NOTE
Crisis Note:  Spoke with Nurse Marie Winn, ED advised Pt has been admitted to [facility]; but awaiting orders. Caity Varner, LPC, CSAC, CADC  Lauri Counselor

## 2023-11-16 NOTE — GROUP NOTE
Group Therapy Note    Date: 11/16/2023    Group Start Time: 0930  Group End Time: 2646  Group Topic: Music Therapy      Ana Duran        Group Therapy Note    Attendees: 5/11    Group Focus: Music therapy group consisted of collaborative music making with a focus on musical improvisation. Therapist assisted with verbal processing following music making experiences. The group may promote improved mood, energy, group cohesion, sense of self-efficacy, present-centered focus, and use of music as an outlet for self-expression. Notes:  Pt did not attend group due to being admitted to the unit around the start time of group.       Discipline Responsible: /Counselor      Signature:  HERMELINDA Lewis, [facility] Music Therapist  Licensed Professional Counselor

## 2023-11-17 LAB
GLUCOSE BLD STRIP.AUTO-MCNC: 103 MG/DL (ref 70–110)
GLUCOSE BLD STRIP.AUTO-MCNC: 104 MG/DL (ref 70–110)
GLUCOSE BLD STRIP.AUTO-MCNC: 110 MG/DL (ref 70–110)
GLUCOSE BLD STRIP.AUTO-MCNC: 99 MG/DL (ref 70–110)

## 2023-11-17 PROCEDURE — 1240000000 HC EMOTIONAL WELLNESS R&B

## 2023-11-17 PROCEDURE — 82962 GLUCOSE BLOOD TEST: CPT

## 2023-11-17 PROCEDURE — 6370000000 HC RX 637 (ALT 250 FOR IP): Performed by: PSYCHIATRY & NEUROLOGY

## 2023-11-17 PROCEDURE — 99232 SBSQ HOSP IP/OBS MODERATE 35: CPT | Performed by: PSYCHIATRY & NEUROLOGY

## 2023-11-17 PROCEDURE — 6370000000 HC RX 637 (ALT 250 FOR IP)

## 2023-11-17 RX ADMIN — CITALOPRAM HYDROBROMIDE 10 MG: 20 TABLET ORAL at 07:54

## 2023-11-17 RX ADMIN — HYDROXYZINE PAMOATE 50 MG: 50 CAPSULE ORAL at 04:41

## 2023-11-17 RX ADMIN — PANTOPRAZOLE SODIUM 40 MG: 40 TABLET, DELAYED RELEASE ORAL at 07:54

## 2023-11-17 RX ADMIN — TRAZODONE HYDROCHLORIDE 50 MG: 50 TABLET ORAL at 20:14

## 2023-11-17 NOTE — BH NOTE
2206- pt has been isolated to self resting and in day area. Pt very pleasant. Reported sick symptoms: running nose, sneezing, and coughing. PRN meds provided per pt request.  Denied si/hi/avh during time of assessment. Will continue to monitor and support as needed. 1151-pt alert and oriented. Denied nausea, but endorsed, \"Every time I come here I get sick. \"  Pt was educated on infection prevention. All needs assessed and met. Will continue to monitor and support as needed.

## 2023-11-17 NOTE — PLAN OF CARE
Problem: Self Harm/Suicidality  Goal: Will have no self-injury during hospital stay  Description: INTERVENTIONS:  1. Ensure constant observer at bedside with Q15M safety checks  2. Maintain a safe environment  3. Secure patient belongings  4. Ensure family/visitors adhere to safety recommendations  5. Ensure safety tray has been added to patient's diet order  6. Every shift and PRN: Re-assess suicidal risk via Frequent Screener    Outcome: Progressing     Problem: Depression  Goal: Will be euthymic at discharge  Description: INTERVENTIONS:  1. Administer medication as ordered  2. Provide emotional support via 1:1 interaction with staff  3. Encourage involvement in milieu/groups/activities  4. Monitor for social isolation  Outcome: Progressing     Problem: Behavior  Goal: Pt/Family maintain appropriate behavior and adhere to behavioral management agreement, if implemented  Description: INTERVENTIONS:  1. Assess patient/family's coping skills and  non-compliant behavior (including use of illegal substances)  2. Notify security of behavior or suspected illegal substances which indicate the need for search of the family and/or belongings  3. Encourage verbalization of thoughts and concerns in a socially appropriate manner  4. Utilize positive, consistent limit setting strategies supporting safety of patient, staff and others  5. Encourage participation in the decision making process about the behavioral management agreement  6. If a visitor's behavior poses a threat to safety call refer to organization policy. 7. Initiate consult with , Psychosocial CNS, Spiritual Care as appropriate  Outcome: Progressing   Patient presents euthymic on unit. He denies SI/HI and AVH. Patient is med/meal compliant and cooperative with all phases of care. No behavioral issues noted.

## 2023-11-17 NOTE — GROUP NOTE
Group Therapy Note    Date: 11/17/2023    Group Start Time: 1300  Group End Time: 9838  Group Topic: Music Therapy    Yelena Davis        Group Therapy Note    Attendees: 7/11    Group Focus: Music therapy group consisted of a game of Name That Tune. The group was split into two teams with nursing students, staff, and patients. The group may promote improved mood, present-centered focus, positive social interaction, and use of music as a coping skill. Notes:  Pt did not attend group.     Discipline Responsible: /Counselor      Signature:  HERMELINDA Macedo, [address] [facility] Music Therapist  Licensed Professional Counselor

## 2023-11-17 NOTE — GROUP NOTE
Group Therapy Note    Date: 11/17/2023    Group Start Time: 1500  Group End Time: 9923  Group Topic: Recreational    [address] 1 ADULT CHEM DEP    Yelena Montano        Group Therapy Note    Attendees: 6/11      Group Type: Game: Family Feud/ Mental Health Family Feud     Group Focus: Recreational therapy group engaged patients through a group game. RT placed patients into two teams to encourage team building and asking a variety of questions to encourage critical thinking. The group can assist in increasing positive mood, reducing stress, and using good social, coping and problem-solving skills.                 Notes:  Patient Declined    Recreational Therapist  Ginger Fenton

## 2023-11-17 NOTE — PROGRESS NOTES
SOCIAL WORK GROUP THERAPY PROGRESS NOTE    Group Time:  10:15am     Group Topic:  Coping Skills    C D Issues    Group Participation:      Pt moderately involved during group discussion but remained attentive. Still dysphoric with some anxiety about \"personal\" issues. Also looked at the typical \"stages of alcohol & drug recovery\" from withdrawal, \"honeymoon\" stage, hitting \"The WALL\", learning new skills, maintaining a \"balanced lifestyle & monitoring for \"relapse\" warning signs. Pt mostly focused on referral to x30 day tx.

## 2023-11-17 NOTE — GROUP NOTE
Group Therapy Note    Date: 11/17/2023    Group Start Time: 1400  Group End Time: 6135  Group Topic: Psychoeducation        Robbi Ken        Group Therapy Note    Attendees: 7  Group focus- How to overcome procrastination     Discipline Responsible: /Counselor      Signature:  Robbi Ken

## 2023-11-18 PROCEDURE — 6370000000 HC RX 637 (ALT 250 FOR IP): Performed by: PSYCHIATRY & NEUROLOGY

## 2023-11-18 PROCEDURE — 1240000000 HC EMOTIONAL WELLNESS R&B

## 2023-11-18 PROCEDURE — 6370000000 HC RX 637 (ALT 250 FOR IP)

## 2023-11-18 PROCEDURE — 99231 SBSQ HOSP IP/OBS SF/LOW 25: CPT | Performed by: PSYCHIATRY & NEUROLOGY

## 2023-11-18 RX ORDER — CITALOPRAM 20 MG/1
20 TABLET ORAL DAILY
Status: DISCONTINUED | OUTPATIENT
Start: 2023-11-19 | End: 2023-11-20 | Stop reason: HOSPADM

## 2023-11-18 RX ADMIN — PANTOPRAZOLE SODIUM 40 MG: 40 TABLET, DELAYED RELEASE ORAL at 08:21

## 2023-11-18 RX ADMIN — TRAZODONE HYDROCHLORIDE 50 MG: 50 TABLET ORAL at 20:10

## 2023-11-18 RX ADMIN — CITALOPRAM HYDROBROMIDE 10 MG: 20 TABLET ORAL at 08:21

## 2023-11-18 NOTE — PLAN OF CARE
Problem: Risk for Elopement  Goal: Patient will not exit the unit/facility without proper excort  Outcome: Progressing     Problem: Self Harm/Suicidality  Goal: Will have no self-injury during hospital stay  11/17/2023 2043 by Henry Yang RN  Outcome: Progressing  11/17/2023 1321 by Patricio Fuentes RN  Outcome: Progressing     Problem: Depression  Goal: Will be euthymic at discharge  11/17/2023 2043 by Henry Yang RN  Outcome: Progressing  11/17/2023 1321 by Patricio Fuentes RN  Outcome: Progressing     Problem: Behavior  Goal: Pt/Family maintain appropriate behavior and adhere to behavioral management agreement, if implemented  11/17/2023 2043 by Henry Yang RN  Outcome: Progressing  11/17/2023 1321 by Patricio Fuentes RN  Outcome: Progressing     Problem: Anxiety  Goal: Will report anxiety at manageable levels  Outcome: Progressing     Problem: Drug Abuse/Detox  Goal: Will have no detox symptoms and will verbalize plan for changing drug-related behavior  Outcome: Progressing     Problem: Sleep Disturbance  Goal: Will exhibit normal sleeping pattern  Outcome: Progressing     Problem: Self Care Deficit  Goal: Return ADL status to a safe level of function  Outcome: Progressing     Problem: Spiritual Care  Goal: Pt/Family able to move forward in process of forgiving self, others, and/or higher power  Outcome: Progressing

## 2023-11-18 NOTE — GROUP NOTE
Art Therapy Group Progress Note    PATIENT SCHEDULED FOR GROUP AT:  12:00 PM    GROUP STOP TIME:  12:45 PM      ATTENDANCE: Moderate (7/10 participants)     TOPIC / FOCUS: [place] Mental [facility] Day       GOALS:  define personal meaning of wellness, promote positive coping skills, identify steps toward achieving ideal mental health days, encourage self-awareness, promote creativity    PARTICIPATION LEVEL:  active listener, interactive, participated in art, sharing, social     PARTICIPATION QUALITY:  appropriate, attentive, sharing    ATTENTION LEVEL: focused, invested, low energy     LEVEL OF CONSCIOUSNESS: alert, oriented X4, attentive    SPEECH: normal    THOUGHT PROCESS/ CONTENT: logical, linear    AFFECTIVE FUNCTIONING: congruent    MOOD: euthymic    SYMBOLIC & THEMATIC CONTENT AS NOTED IN IMAGERY: Pt was finishing lunch at the start of group. Upon finishing, Pt was provided materials and reminded of the prompt. Pt hipolito a small nature scene. PT reported it was not a place they had been, they stated it was an image they had come across and thought would be a neisha place to visit. Pt artwork was appropriate color fit and relatively normative, except it was very small in size and the carmen making suggests self-soothing from anxiety. The PT noted that they needed to do the correct paperwork, be honest with the doctors and social workers, and be honest about what help they really needed. Pt was vague about what some of the things they may need to be successful in reaching their ideal mental health day. Pt was supportive and encouraging of others.      STATUS AFTER INTERVENTION: unchanged     RESPONSE TO LEARNING: able to verbalize current knowledge/ experience, able to verbalize/acknowledge new learning    ENDINGS: none reported    MODES OF INTERVENTION: exploration, art media, socialization, psychoeducation     DISCIPLINE RESPONSIBLE: Art Therapist     Yessenia Rolon  Art Therapist, MA

## 2023-11-18 NOTE — PLAN OF CARE
Pt alert and oriented x 4 denies si/hi , hallucinations pain . No s/s of hypohyperglycemia . Pt tolerated meds well,  with  no co. Pt out in the milieu watching tv. Gets along well with peers.

## 2023-11-18 NOTE — PLAN OF CARE
Problem: Depression  Goal: Will be euthymic at discharge  Description: INTERVENTIONS:  1. Administer medication as ordered  2. Provide emotional support via 1:1 interaction with staff  3. Encourage involvement in milieu/groups/activities  4. Monitor for social isolation  11/18/2023 1005 by Katelynn Yeung RN  Outcome: Progressing  11/17/2023 2043 by Corazon Bolden RN  Outcome: Progressing     Problem: Self Harm/Suicidality  Goal: Will have no self-injury during hospital stay  Description: INTERVENTIONS:  1. Ensure constant observer at bedside with Q15M safety checks  2. Maintain a safe environment  3. Secure patient belongings  4. Ensure family/visitors adhere to safety recommendations  5. Ensure safety tray has been added to patient's diet order  6. Every shift and PRN: Re-assess suicidal risk via Frequent Screener    11/18/2023 1005 by Katelynn Yeung RN  Outcome: Progressing  11/17/2023 2043 by Corazon Bolden RN  Outcome: Progressing    Patient received this am alert and verbal, no c/o pain or s/o distress, compliant with meds, eating well and sleeping well, attending group therapy, patient denies SI , HI, and auditory/visual hallucinations, patient mood is elevated,   will continue to monitor behavior and implement treatment plan. room air

## 2023-11-19 PROCEDURE — 1240000000 HC EMOTIONAL WELLNESS R&B

## 2023-11-19 PROCEDURE — 6370000000 HC RX 637 (ALT 250 FOR IP): Performed by: PSYCHIATRY & NEUROLOGY

## 2023-11-19 PROCEDURE — 6370000000 HC RX 637 (ALT 250 FOR IP)

## 2023-11-19 PROCEDURE — 99231 SBSQ HOSP IP/OBS SF/LOW 25: CPT | Performed by: PSYCHIATRY & NEUROLOGY

## 2023-11-19 RX ADMIN — PANTOPRAZOLE SODIUM 40 MG: 40 TABLET, DELAYED RELEASE ORAL at 07:44

## 2023-11-19 RX ADMIN — HYDROXYZINE PAMOATE 50 MG: 50 CAPSULE ORAL at 20:05

## 2023-11-19 RX ADMIN — TRAZODONE HYDROCHLORIDE 50 MG: 50 TABLET ORAL at 20:05

## 2023-11-19 RX ADMIN — CITALOPRAM HYDROBROMIDE 20 MG: 20 TABLET ORAL at 07:44

## 2023-11-19 NOTE — BH NOTE
PT presents with a dull affect and a depressed mood. Pt has been withdrawn from his room for the majority of this shift. Pt was out for a snack and medication and went back to his room, where he is resting comfortably. PT offered no complaint at this time. He denies SI/HI/AVH, with no safety issues noted. Will monitor.

## 2023-11-19 NOTE — PLAN OF CARE
Problem: Risk for Elopement  Goal: Patient will not exit the unit/facility without proper excort  Outcome: Progressing     Problem: Self Harm/Suicidality  Goal: Will have no self-injury during hospital stay  Description: INTERVENTIONS:  1. Ensure constant observer at bedside with Q15M safety checks  2. Maintain a safe environment  3. Secure patient belongings  4. Ensure family/visitors adhere to safety recommendations  5. Ensure safety tray has been added to patient's diet order  6. Every shift and PRN: Re-assess suicidal risk via Frequent Screener    Outcome: Progressing     Problem: Depression  Goal: Will be euthymic at discharge  Description: INTERVENTIONS:  1. Administer medication as ordered  2. Provide emotional support via 1:1 interaction with staff  3. Encourage involvement in milieu/groups/activities  4. Monitor for social isolation  Outcome: Progressing     Pt presents with elevated affect, anxious mood, circumstantial thought process, intrusive at times. Pt displays attention-seeking and staff-splitting behaviors. Pt has been social on the unit. Pt denies SI/HI/AVH. Pt is medication compliant.

## 2023-11-20 VITALS
HEIGHT: 74 IN | WEIGHT: 160 LBS | DIASTOLIC BLOOD PRESSURE: 85 MMHG | TEMPERATURE: 98.6 F | SYSTOLIC BLOOD PRESSURE: 141 MMHG | HEART RATE: 72 BPM | RESPIRATION RATE: 18 BRPM | OXYGEN SATURATION: 98 % | BODY MASS INDEX: 20.53 KG/M2

## 2023-11-20 PROCEDURE — 6370000000 HC RX 637 (ALT 250 FOR IP): Performed by: PSYCHIATRY & NEUROLOGY

## 2023-11-20 PROCEDURE — 6370000000 HC RX 637 (ALT 250 FOR IP)

## 2023-11-20 PROCEDURE — 99238 HOSP IP/OBS DSCHRG MGMT 30/<: CPT | Performed by: PSYCHIATRY & NEUROLOGY

## 2023-11-20 RX ORDER — TRAZODONE HYDROCHLORIDE 50 MG/1
50 TABLET ORAL NIGHTLY PRN
Qty: 15 TABLET | Refills: 1 | Status: SHIPPED | OUTPATIENT
Start: 2023-11-20

## 2023-11-20 RX ORDER — CITALOPRAM 20 MG/1
20 TABLET ORAL DAILY
Qty: 15 TABLET | Refills: 1 | Status: SHIPPED | OUTPATIENT
Start: 2023-11-21

## 2023-11-20 RX ORDER — PANTOPRAZOLE SODIUM 40 MG/1
40 TABLET, DELAYED RELEASE ORAL
Qty: 15 TABLET | Refills: 1 | Status: SHIPPED | OUTPATIENT
Start: 2023-11-21

## 2023-11-20 RX ADMIN — CITALOPRAM HYDROBROMIDE 20 MG: 20 TABLET ORAL at 08:55

## 2023-11-20 RX ADMIN — PANTOPRAZOLE SODIUM 40 MG: 40 TABLET, DELAYED RELEASE ORAL at 06:14

## 2023-11-20 NOTE — PROGRESS NOTES
Received discharge orders for discharge from hospital, patient in good condition, all paperwork signed and reviewed, copy and prescriptions given to patient, patient belongings verified by patient,  safety plan completed, patient escorted by [facility] staff to lobby. Patient given bus pass.

## 2023-11-20 NOTE — PROGRESS NOTES
SW Contact:      Pt Contact: he continues to emphasize the need for another opportunity with a x30 day S A tx Program. Pt goal is to recommit to establish foundation for long term \"Recovery\" Program.    Collateral: Pt Clinical Faxed to:    [facility]  [address] [place] [address], [address] [place] [address]  # [phone]    Dr & tx team updated.

## 2023-11-20 NOTE — PLAN OF CARE
Problem: Risk for Elopement  Goal: Patient will not exit the unit/facility without proper excort  11/19/2023 2006 by Mya Jimenez RN  Outcome: Progressing  11/19/2023 1130 by Manuel Cerda RN  Outcome: Progressing     Problem: Self Harm/Suicidality  Goal: Will have no self-injury during hospital stay  Description: INTERVENTIONS:  1. Ensure constant observer at bedside with Q15M safety checks  2. Maintain a safe environment  3. Secure patient belongings  4. Ensure family/visitors adhere to safety recommendations  5. Ensure safety tray has been added to patient's diet order  6. Every shift and PRN: Re-assess suicidal risk via Frequent Screener    11/19/2023 2006 by Mya Jimenez RN  Outcome: Progressing  11/19/2023 1130 by Manuel Cerda RN  Outcome: Progressing     Problem: Depression  Goal: Will be euthymic at discharge  Description: INTERVENTIONS:  1. Administer medication as ordered  2. Provide emotional support via 1:1 interaction with staff  3. Encourage involvement in milieu/groups/activities  4. Monitor for social isolation  11/19/2023 2006 by Mya Jimenez RN  Outcome: Progressing  11/19/2023 1130 by Manuel Cerda RN  Outcome: Progressing     Problem: Anxiety  Goal: Will report anxiety at manageable levels  Description: INTERVENTIONS:  1. Administer medication as ordered  2. Teach and rehearse alternative coping skills  3. Provide emotional support with 1:1 interaction with staff  Outcome: Progressing     Problem: Risk for Elopement  Goal: Patient will not exit the unit/facility without proper excort  11/19/2023 2006 by Mya Jimenez RN  Outcome: Progressing  11/19/2023 1130 by Manuel Cerda RN  Outcome: Progressing     Problem: Self Harm/Suicidality  Goal: Will have no self-injury during hospital stay  Description: INTERVENTIONS:  1. Ensure constant observer at bedside with Q15M safety checks  2. Maintain a safe environment  3. Secure patient belongings  4.   Ensure

## 2023-11-20 NOTE — PROGRESS NOTES
SOCIAL WORK GROUP THERAPY PROGRESS NOTE    Group Time:  10:15am    Group Topic:  Coping Skills    Group Participation:     Pt was unavailable for group due to working with nursing staff on his d/c plan.

## 2023-11-20 NOTE — GROUP NOTE
Group Therapy Note    Date: 11/20/2023    Group Start Time: 0930  Group End Time: 8279  Group Topic: Recreational        [facility] Therapy Note    Attendees: 7/10      Group type: Exercise       This recreational group engaged patients in a physical activity. Recreational therapist lead group members in guided exercises. Patients discussed different physical activities, relaxation techniques, healthier food choices they may use or can add to their daily routines. This group may help reduce feelings of  depression and stress,  and enhance mood and over emotional well-being. Notes:  Patient joined group debriefing, about 10 minutes before ending. Status After Intervention:  Unchanged    Participation Level:  Active Listener and Interactive    Participation Quality: Appropriate, Attentive, and Sharing      Speech:  normal      Thought Process/Content: Logical      Affective Functioning: Congruent      Mood: euthymic      Level of consciousness:  Alert and Attentive      Response to Learning: Able to verbalize current knowledge/experience      Endings: None Reported    Modes of Intervention: Socialization, Movement, and Media       Recreational Therapist  Shirley Walker

## 2023-11-20 NOTE — PROGRESS NOTES
SW Contact:      Pt Contact: Pt continues to verbalize his commitment to focus on his substance abuse issues, not only during  x30 day tx, but also need to form \"Recovery\" program after program completion. D/C and Safety plan was reviewed to include:     Pt follow up will be :    [facility]  [address] [facility]  # [phone]  Pt will complete process for Admission on his own / going to family member's home [address] [place] [address]  # [phone]    Pt will receive a Sandra Meza    Dr & tx team updated

## 2023-12-02 NOTE — DISCHARGE SUMMARY
[facility]  PSYCH DISCHARGE    Name:  LIBRADO EDOUARD  MR#:   [id number]  :  1980  ACCOUNT #:  [id number]  ADMIT DATE:  11/15/2023  DISCHARGE DATE:  2023    Admission to the behavioral unit 2023.    SIGNIFICANT FINDINGS:  History and physical exam was performed by the undersigned shortly after admitted to the facility, attention invited to this document, a place where the patient's reasons for consultation with our emergency department, findings upon his being examined there, the same as his prior psychiatric history are very clearly stated.  Specifically, basis for which the patient required to be considered for psychiatric admission are stated in this admission note.    So, for the purpose of this discharge summary, the reader is advised that indeed the patient came to our ER with a history of being suicidal, this resulting from his using drugs again.  Specifically, he described the use of crack cocaine, having been using crack on a daily basis and on occasion more than one a day.  He noted his becoming increasingly depressed and suicidal.  He described the different ways of harming himself including overdosing, the same as physical harm.  The case was then presented to the physician on-call who admitted the patient to my service.  The patient was found to be not the best historian; however, by chart review, previous hospitalizations had occurred with the patient being noted since 2017 having multiple admissions due to his history of drug use and/or being depressed.  In  to , the patient had a total of 8 visits here at [facility] related to his use of drugs, specifically crack cocaine.  It is to be mentioned that in addition to his using drugs, the patient described other stressors including his being homeless and his not having a job or any type of secure income.  Multiple diagnoses have been used to describe the patient's psychiatric symptoms

## 2024-06-26 NOTE — BH NOTES
Patient given Trazodone for insomnia per patient request will continue to monitor. Elidel Pregnancy And Lactation Text: This medication is Pregnancy Category C. It is unknown if this medication is excreted in breast milk. Bimzelx Counseling:  I discussed with the patient the risks of Bimzelx including but not limited to depression, immunosuppression, allergic reactions and infections.  The patient understands that monitoring is required including a PPD at baseline and must alert us or the primary physician if symptoms of infection or other concerning signs are noted. Doxycycline Pregnancy And Lactation Text: This medication is Pregnancy Category D and not consider safe during pregnancy. It is also excreted in breast milk but is considered safe for shorter treatment courses. Birth Control Pills Counseling: Birth Control Pill Counseling: I discussed with the patient the potential side effects of OCPs including but not limited to increased risk of stroke, heart attack, thrombophlebitis, deep venous thrombosis, hepatic adenomas, breast changes, GI upset, headaches, and depression.  The patient verbalized understanding of the proper use and possible adverse effects of OCPs. All of the patient's questions and concerns were addressed. Isotretinoin Pregnancy And Lactation Text: This medication is Pregnancy Category X and is considered extremely dangerous during pregnancy. It is unknown if it is excreted in breast milk. Libtayo Pregnancy And Lactation Text: This medication is contraindicated in pregnancy and when breast feeding. Rituxan Pregnancy And Lactation Text: This medication is Pregnancy Category C and it isn't know if it is safe during pregnancy. It is unknown if this medication is excreted in breast milk but similar antibodies are known to be excreted. Topical Sulfur Applications Pregnancy And Lactation Text: This medication is considered safe during pregnancy and breast feeding secondary to limited systemic absorption. Azithromycin Counseling:  I discussed with the patient the risks of azithromycin including but not limited to GI upset, allergic reaction, drug rash, diarrhea, and yeast infections. Colchicine Pregnancy And Lactation Text: This medication is Pregnancy Category C and isn't considered safe during pregnancy. It is excreted in breast milk. Topical Clindamycin Counseling: Patient counseled that this medication may cause skin irritation or allergic reactions.  In the event of skin irritation, the patient was advised to reduce the amount of the drug applied or use it less frequently.   The patient verbalized understanding of the proper use and possible adverse effects of clindamycin.  All of the patient's questions and concerns were addressed. Carac Counseling:  I discussed with the patient the risks of Carac including but not limited to erythema, scaling, itching, weeping, crusting, and pain. Rifampin Pregnancy And Lactation Text: This medication is Pregnancy Category C and it isn't know if it is safe during pregnancy. It is also excreted in breast milk and should not be used if you are breast feeding. Hydroxyzine Pregnancy And Lactation Text: This medication is not safe during pregnancy and should not be taken. It is also excreted in breast milk and breast feeding isn't recommended. Sski Pregnancy And Lactation Text: This medication is Pregnancy Category D and isn't considered safe during pregnancy. It is excreted in breast milk. Itraconazole Counseling:  I discussed with the patient the risks of itraconazole including but not limited to liver damage, nausea/vomiting, neuropathy, and severe allergy.  The patient understands that this medication is best absorbed when taken with acidic beverages such as non-diet cola or ginger ale.  The patient understands that monitoring is required including baseline LFTs and repeat LFTs at intervals.  The patient understands that they are to contact us or the primary physician if concerning signs are noted. Humira Counseling:  I discussed with the patient the risks of adalimumab including but not limited to myelosuppression, immunosuppression, autoimmune hepatitis, demyelinating diseases, lymphoma, and serious infections.  The patient understands that monitoring is required including a PPD at baseline and must alert us or the primary physician if symptoms of infection or other concerning signs are noted. Rhofade Pregnancy And Lactation Text: This medication has not been assigned a Pregnancy Risk Category. It is unknown if the medication is excreted in breast milk. Minoxidil Pregnancy And Lactation Text: This medication has not been assigned a Pregnancy Risk Category but animal studies failed to show danger with the topical medication. It is unknown if the medication is excreted in breast milk. Low Dose Naltrexone Pregnancy And Lactation Text: Naltrexone is pregnancy category C.  There have been no adequate and well-controlled studies in pregnant women.  It should be used in pregnancy only if the potential benefit justifies the potential risk to the fetus.   Limited data indicates that naltrexone is minimally excreted into breastmilk. Erythromycin Counseling:  I discussed with the patient the risks of erythromycin including but not limited to GI upset, allergic reaction, drug rash, diarrhea, increase in liver enzymes, and yeast infections. Cellcept Pregnancy And Lactation Text: This medication is Pregnancy Category D and isn't considered safe during pregnancy. It is unknown if this medication is excreted in breast milk. Sotyktu Pregnancy And Lactation Text: There is insufficient data to evaluate whether or not Sotyktu is safe to use during pregnancy.   It is not known if Sotyktu passes into breast milk and whether or not it is safe to use when breastfeeding.   Bimzelx Pregnancy And Lactation Text: This medication crosses the placenta and the safety is uncertain during pregnancy. It is unknown if this medication is present in breast milk. Odomzo Counseling- I discussed with the patient the risks of Odomzo including but not limited to nausea, vomiting, diarrhea, constipation, weight loss, changes in the sense of taste, decreased appetite, muscle spasms, and hair loss.  The patient verbalized understanding of the proper use and possible adverse effects of Odomzo.  All of the patient's questions and concerns were addressed. Eucrisa Counseling: Patient may experience a mild burning sensation during topical application. Eucrisa is not approved in children less than 3 months of age. Taltz Pregnancy And Lactation Text: The risk during pregnancy and breastfeeding is uncertain with this medication. Siliq Counseling:  I discussed with the patient the risks of Siliq including but not limited to new or worsening depression, suicidal thoughts and behavior, immunosuppression, malignancy, posterior leukoencephalopathy syndrome, and serious infections.  The patient understands that monitoring is required including a PPD at baseline and must alert us or the primary physician if symptoms of infection or other concerning signs are noted. There is also a special program designed to monitor depression which is required with Siliq. Carac Pregnancy And Lactation Text: This medication is Pregnancy Category X and contraindicated in pregnancy and in women who may become pregnant. It is unknown if this medication is excreted in breast milk. Wartpeel Counseling:  I discussed with the patient the risks of Wartpeel including but not limited to erythema, scaling, itching, weeping, crusting, and pain. Birth Control Pills Pregnancy And Lactation Text: This medication should be avoided if pregnant and for the first 30 days post-partum. High Dose Vitamin A Counseling: Side effects reviewed, pt to contact office should one occur. Azithromycin Pregnancy And Lactation Text: This medication is considered safe during pregnancy and is also secreted in breast milk. Cibinqo Counseling: I discussed with the patient the risks of Cibinqo therapy including but not limited to common cold, nausea, headache, cold sores, increased blood CPK levels, dizziness, UTIs, fatigue, acne, and vomitting. Live vaccines should be avoided.  This medication has been linked to serious infections; higher rate of mortality; malignancy and lymphoproliferative disorders; major adverse cardiovascular events; thrombosis; thrombocytopenia and lymphopenia; lipid elevations; and retinal detachment. Dapsone Counseling: I discussed with the patient the risks of dapsone including but not limited to hemolytic anemia, agranulocytosis, rashes, methemoglobinemia, kidney failure, peripheral neuropathy, headaches, GI upset, and liver toxicity.  Patients who start dapsone require monitoring including baseline LFTs and weekly CBCs for the first month, then every month thereafter.  The patient verbalized understanding of the proper use and possible adverse effects of dapsone.  All of the patient's questions and concerns were addressed. Itraconazole Pregnancy And Lactation Text: This medication is Pregnancy Category C and it isn't know if it is safe during pregnancy. It is also excreted in breast milk. Mirvaso Counseling: Mirvaso is a topical medication which can decrease superficial blood flow where applied. Side effects are uncommon and include stinging, redness and allergic reactions. Thalidomide Counseling: I discussed with the patient the risks of thalidomide including but not limited to birth defects, anxiety, weakness, chest pain, dizziness, cough and severe allergy. Sarecycline Counseling: Patient advised regarding possible photosensitivity and discoloration of the teeth, skin, lips, tongue and gums.  Patient instructed to avoid sunlight, if possible.  When exposed to sunlight, patients should wear protective clothing, sunglasses, and sunscreen.  The patient was instructed to call the office immediately if the following severe adverse effects occur:  hearing changes, easy bruising/bleeding, severe headache, or vision changes.  The patient verbalized understanding of the proper use and possible adverse effects of sarecycline.  All of the patient's questions and concerns were addressed. Humira Pregnancy And Lactation Text: This medication is Pregnancy Category B and is considered safe during pregnancy. It is unknown if this medication is excreted in breast milk. Topical Clindamycin Pregnancy And Lactation Text: This medication is Pregnancy Category B and is considered safe during pregnancy. It is unknown if it is excreted in breast milk. Solaraze Counseling:  I discussed with the patient the risks of Solaraze including but not limited to erythema, scaling, itching, weeping, crusting, and pain. Cyclophosphamide Counseling:  I discussed with the patient the risks of cyclophosphamide including but not limited to hair loss, hormonal abnormalities, decreased fertility, abdominal pain, diarrhea, nausea and vomiting, bone marrow suppression and infection. The patient understands that monitoring is required while taking this medication. Tremfya Counseling: I discussed with the patient the risks of guselkumab including but not limited to immunosuppression, serious infections, and drug reactions.  The patient understands that monitoring is required including a PPD at baseline and must alert us or the primary physician if symptoms of infection or other concerning signs are noted. Erythromycin Pregnancy And Lactation Text: This medication is Pregnancy Category B and is considered safe during pregnancy. It is also excreted in breast milk. Niacinamide Counseling: I recommended taking niacin or niacinamide, also know as vitamin B3, twice daily. Recent evidence suggests that taking vitamin B3 (500 mg twice daily) can reduce the risk of actinic keratoses and non-melanoma skin cancers. Side effects of vitamin B3 include flushing and headache. Albendazole Counseling:  I discussed with the patient the risks of albendazole including but not limited to cytopenia, kidney damage, nausea/vomiting and severe allergy.  The patient understands that this medication is being used in an off-label manner. High Dose Vitamin A Pregnancy And Lactation Text: High dose vitamin A therapy is contraindicated during pregnancy and breast feeding. Cimzia Counseling:  I discussed with the patient the risks of Cimzia including but not limited to immunosuppression, allergic reactions and infections.  The patient understands that monitoring is required including a PPD at baseline and must alert us or the primary physician if symptoms of infection or other concerning signs are noted. Xeljanz Counseling: I discussed with the patient the risks of Xeljanz therapy including increased risk of infection, liver issues, headache, diarrhea, or cold symptoms. Live vaccines should be avoided. They were instructed to call if they have any problems. Odomzo Pregnancy And Lactation Text: This medication is Pregnancy Category X and is absolutely contraindicated during pregnancy. It is unknown if it is excreted in breast milk. Cibinqo Pregnancy And Lactation Text: It is unknown if this medication will adversely affect pregnancy or breast feeding.  You should not take this medication if you are currently pregnant or planning a pregnancy or while breastfeeding. Dapsone Pregnancy And Lactation Text: This medication is Pregnancy Category C and is not considered safe during pregnancy or breast feeding. Opzelura Pregnancy And Lactation Text: There is insufficient data to evaluate drug-associated risk for major birth defects, miscarriage, or other adverse maternal or fetal outcomes.  There is a pregnancy registry that monitors pregnancy outcomes in pregnant persons exposed to the medication during pregnancy.  It is unknown if this medication is excreted in breast milk.  Do not breastfeed during treatment and for about 4 weeks after the last dose. Calcipotriene Counseling:  I discussed with the patient the risks of calcipotriene including but not limited to erythema, scaling, itching, and irritation. Opioid Counseling: I discussed with the patient the potential side effects of opioids including but not limited to addiction, altered mental status, and depression. I stressed avoiding alcohol, benzodiazepines, muscle relaxants and sleep aids unless specifically okayed by a physician. The patient verbalized understanding of the proper use and possible adverse effects of opioids. All of the patient's questions and concerns were addressed. They were instructed to flush the remaining pills down the toilet if they did not need them for pain. Ketoconazole Counseling:   Patient counseled regarding improving absorption with orange juice.  Adverse effects include but are not limited to breast enlargement, headache, diarrhea, nausea, upset stomach, liver function test abnormalities, taste disturbance, and stomach pain.  There is a rare possibility of liver failure that can occur when taking ketoconazole. The patient understands that monitoring of LFTs may be required, especially at baseline. The patient verbalized understanding of the proper use and possible adverse effects of ketoconazole.  All of the patient's questions and concerns were addressed. Bactrim Counseling:  I discussed with the patient the risks of sulfa antibiotics including but not limited to GI upset, allergic reaction, drug rash, diarrhea, dizziness, photosensitivity, and yeast infections.  Rarely, more serious reactions can occur including but not limited to aplastic anemia, agranulocytosis, methemoglobinemia, blood dyscrasias, liver or kidney failure, lung infiltrates or desquamative/blistering drug rashes. Spironolactone Counseling: Patient advised regarding risks of diarrhea, abdominal pain, hyperkalemia, birth defects (for female patients), liver toxicity and renal toxicity. The patient may need blood work to monitor liver and kidney function and potassium levels while on therapy. The patient verbalized understanding of the proper use and possible adverse effects of spironolactone.  All of the patient's questions and concerns were addressed. Hyrimoz Counseling:  I discussed with the patient the risks of adalimumab including but not limited to myelosuppression, immunosuppression, autoimmune hepatitis, demyelinating diseases, lymphoma, and serious infections.  The patient understands that monitoring is required including a PPD at baseline and must alert us or the primary physician if symptoms of infection or other concerning signs are noted. Topical Ketoconazole Counseling: Patient counseled that this medication may cause skin irritation or allergic reactions.  In the event of skin irritation, the patient was advised to reduce the amount of the drug applied or use it less frequently.   The patient verbalized understanding of the proper use and possible adverse effects of ketoconazole.  All of the patient's questions and concerns were addressed. Sarecycline Pregnancy And Lactation Text: This medication is Pregnancy Category D and not consider safe during pregnancy. It is also excreted in breast milk. Metronidazole Counseling:  I discussed with the patient the risks of metronidazole including but not limited to seizures, nausea/vomiting, a metallic taste in the mouth, nausea/vomiting and severe allergy. Niacinamide Pregnancy And Lactation Text: These medications are considered safe during pregnancy. Hydroquinone Counseling:  Patient advised that medication may result in skin irritation, lightening (hypopigmentation), dryness, and burning.  In the event of skin irritation, the patient was advised to reduce the amount of the drug applied or use it less frequently.  Rarely, spots that are treated with hydroquinone can become darker (pseudoochronosis).  Should this occur, patient instructed to stop medication and call the office. The patient verbalized understanding of the proper use and possible adverse effects of hydroquinone.  All of the patient's questions and concerns were addressed. Albendazole Pregnancy And Lactation Text: This medication is Pregnancy Category C and it isn't known if it is safe during pregnancy. It is also excreted in breast milk. Cyclophosphamide Pregnancy And Lactation Text: This medication is Pregnancy Category D and it isn't considered safe during pregnancy. This medication is excreted in breast milk. Calcipotriene Pregnancy And Lactation Text: The use of this medication during pregnancy or lactation is not recommended as there is insufficient data. Solaraze Pregnancy And Lactation Text: This medication is Pregnancy Category B and is considered safe. There is some data to suggest avoiding during the third trimester. It is unknown if this medication is excreted in breast milk. Cimzia Pregnancy And Lactation Text: This medication crosses the placenta but can be considered safe in certain situations. Cimzia may be excreted in breast milk. Otezla Counseling: The side effects of Otezla were discussed with the patient, including but not limited to worsening or new depression, weight loss, diarrhea, nausea, upper respiratory tract infection, and headache. Patient instructed to call the office should any adverse effect occur.  The patient verbalized understanding of the proper use and possible adverse effects of Otezla.  All the patient's questions and concerns were addressed. Picato Counseling:  I discussed with the patient the risks of Picato including but not limited to erythema, scaling, itching, weeping, crusting, and pain. Xelkanz Pregnancy And Lactation Text: This medication is Pregnancy Category D and is not considered safe during pregnancy.  The risk during breast feeding is also uncertain. Winlevi Counseling:  I discussed with the patient the risks of topical clascoterone including but not limited to erythema, scaling, itching, and stinging. Patient voiced their understanding. Gabapentin Counseling: I discussed with the patient the risks of gabapentin including but not limited to dizziness, somnolence, fatigue and ataxia. Bactrim Pregnancy And Lactation Text: This medication is Pregnancy Category D and is known to cause fetal risk.  It is also excreted in breast milk. Litfulo Counseling: I discussed with the patient the risks of Litfulo therapy including but not limited to upper respiratory tract infections, shingles, cold sores, and nausea. Live vaccines should be avoided.  This medication has been linked to serious infections; higher rate of mortality; malignancy and lymphoproliferative disorders; major adverse cardiovascular events; thrombosis; gastrointestinal perforations; neutropenia; lymphopenia; anemia; liver enzyme elevations; and lipid elevations. Opioid Pregnancy And Lactation Text: These medications can lead to premature delivery and should be avoided during pregnancy. These medications are also present in breast milk in small amounts. Simponi Counseling:  I discussed with the patient the risks of golimumab including but not limited to myelosuppression, immunosuppression, autoimmune hepatitis, demyelinating diseases, lymphoma, and serious infections.  The patient understands that monitoring is required including a PPD at baseline and must alert us or the primary physician if symptoms of infection or other concerning signs are noted. Tetracycline Counseling: Patient counseled regarding possible photosensitivity and increased risk for sunburn.  Patient instructed to avoid sunlight, if possible.  When exposed to sunlight, patients should wear protective clothing, sunglasses, and sunscreen.  The patient was instructed to call the office immediately if the following severe adverse effects occur:  hearing changes, easy bruising/bleeding, severe headache, or vision changes.  The patient verbalized understanding of the proper use and possible adverse effects of tetracycline.  All of the patient's questions and concerns were addressed. Patient understands to avoid pregnancy while on therapy due to potential birth defects. Cantharidin Counseling:  I discussed with the patient the risks of Cantharidin including but not limited to pain, redness, burning, itching, and blistering. Aklief counseling:  Patient advised to apply a pea-sized amount only at bedtime and wait 30 minutes after washing their face before applying.  If too drying, patient may add a non-comedogenic moisturizer.  The most commonly reported side effects including irritation, redness, scaling, dryness, stinging, burning, itching, and increased risk of sunburn.  The patient verbalized understanding of the proper use and possible adverse effects of retinoids.  All of the patient's questions and concerns were addressed. Ketoconazole Pregnancy And Lactation Text: This medication is Pregnancy Category C and it isn't know if it is safe during pregnancy. It is also excreted in breast milk and breast feeding isn't recommended. Dutasteride Male Counseling: Dutasteride Counseling:  I discussed with the patient the risks of use of dutasteride including but not limited to decreased libido, decreased ejaculate volume, and gynecomastia. Women who can become pregnant should not handle medication.  All of the patient's questions and concerns were addressed. Tranexamic Acid Counseling:  Patient advised of the small risk of bleeding problems with tranexamic acid. They were also instructed to call if they developed any nausea, vomiting or diarrhea. All of the patient's questions and concerns were addressed. Opzelura Counseling:  I discussed with the patient the risks of Opzelura including but not limited to nasopharngitis, bronchitis, ear infection, eosinophila, hives, diarrhea, folliculitis, tonsillitis, and rhinorrhea.  Taken orally, this medication has been linked to serious infections; higher rate of mortality; malignancy and lymphoproliferative disorders; major adverse cardiovascular events; thrombosis; thrombocytopenia, anemia, and neutropenia; and lipid elevations. Cantharidin Pregnancy And Lactation Text: This medication has not been proven safe during pregnancy. It is unknown if this medication is excreted in breast milk. Otezla Pregnancy And Lactation Text: This medication is Pregnancy Category C and it isn't known if it is safe during pregnancy. It is unknown if it is excreted in breast milk. Metronidazole Pregnancy And Lactation Text: This medication is Pregnancy Category B and considered safe during pregnancy.  It is also excreted in breast milk. Ivermectin Counseling:  Patient instructed to take medication on an empty stomach with a full glass of water.  Patient informed of potential adverse effects including but not limited to nausea, diarrhea, dizziness, itching, and swelling of the extremities or lymph nodes.  The patient verbalized understanding of the proper use and possible adverse effects of ivermectin.  All of the patient's questions and concerns were addressed. Cosentyx Counseling:  I discussed with the patient the risks of Cosentyx including but not limited to worsening of Crohn's disease, immunosuppression, allergic reactions and infections.  The patient understands that monitoring is required including a PPD at baseline and must alert us or the primary physician if symptoms of infection or other concerning signs are noted. Nsaids Counseling: NSAID Counseling: I discussed with the patient that NSAIDs should be taken with food. Prolonged use of NSAIDs can result in the development of stomach ulcers.  Patient advised to stop taking NSAIDs if abdominal pain occurs.  The patient verbalized understanding of the proper use and possible adverse effects of NSAIDs.  All of the patient's questions and concerns were addressed. Soolantra Counseling: I discussed with the patients the risks of topial Soolantra. This is a medicine which decreases the number of mites and inflammation in the skin. You experience burning, stinging, eye irritation or allergic reactions.  Please call our office if you develop any problems from using this medication. Xolair Counseling:  Patient informed of potential adverse effects including but not limited to fever, muscle aches, rash and allergic reactions.  The patient verbalized understanding of the proper use and possible adverse effects of Xolair.  All of the patient's questions and concerns were addressed. Winlevi Pregnancy And Lactation Text: This medication is considered safe during pregnancy and breastfeeding. Cyclosporine Counseling:  I discussed with the patient the risks of cyclosporine including but not limited to hypertension, gingival hyperplasia,myelosuppression, immunosuppression, liver damage, kidney damage, neurotoxicity, lymphoma, and serious infections. The patient understands that monitoring is required including baseline blood pressure, CBC, CMP, lipid panel and uric acid, and then 1-2 times monthly CMP and blood pressure. Cephalexin Counseling: I counseled the patient regarding use of cephalexin as an antibiotic for prophylactic and/or therapeutic purposes. Cephalexin (commonly prescribed under brand name Keflex) is a cephalosporin antibiotic which is active against numerous classes of bacteria, including most skin bacteria. Side effects may include nausea, diarrhea, gastrointestinal upset, rash, hives, yeast infections, and in rare cases, hepatitis, kidney disease, seizures, fever, confusion, neurologic symptoms, and others. Patients with severe allergies to penicillin medications are cautioned that there is about a 10% incidence of cross-reactivity with cephalosporins. When possible, patients with penicillin allergies should use alternatives to cephalosporins for antibiotic therapy. Acitretin Counseling:  I discussed with the patient the risks of acitretin including but not limited to hair loss, dry lips/skin/eyes, liver damage, hyperlipidemia, depression/suicidal ideation, photosensitivity.  Serious rare side effects can include but are not limited to pancreatitis, pseudotumor cerebri, bony changes, clot formation/stroke/heart attack.  Patient understands that alcohol is contraindicated since it can result in liver toxicity and significantly prolong the elimination of the drug by many years. Litfulo Pregnancy And Lactation Text: Based on animal studies, Lifulo may cause embryo-fetal harm when administered to pregnant women.  The medication should not be used in pregnancy.  Breastfeeding is not recommended during treatment. 5-Fu Counseling: 5-Fluorouracil Counseling:  I discussed with the patient the risks of 5-fluorouracil including but not limited to erythema, scaling, itching, weeping, crusting, and pain. Topical Metronidazole Counseling: Metronidazole is a topical antibiotic medication. You may experience burning, stinging, redness, or allergic reactions.  Please call our office if you develop any problems from using this medication. Aklief Pregnancy And Lactation Text: It is unknown if this medication is safe to use during pregnancy.  It is unknown if this medication is excreted in breast milk.  Breastfeeding women should use the topical cream on the smallest area of the skin for the shortest time needed while breastfeeding.  Do not apply to nipple and areola. Arava Counseling:  Patient counseled regarding adverse effects of Arava including but not limited to nausea, vomiting, abnormalities in liver function tests. Patients may develop mouth sores, rash, diarrhea, and abnormalities in blood counts. The patient understands that monitoring is required including LFTs and blood counts.  There is a rare possibility of scarring of the liver and lung problems that can occur when taking methotrexate. Persistent nausea, loss of appetite, pale stools, dark urine, cough, and shortness of breath should be reported immediately. Patient advised to discontinue Arava treatment and consult with a physician prior to attempting conception. The patient will have to undergo a treatment to eliminate Arava from the body prior to conception. Cimetidine Counseling:  I discussed with the patient the risks of Cimetidine including but not limited to gynecomastia, headache, diarrhea, nausea, drowsiness, arrhythmias, pancreatitis, skin rashes, psychosis, bone marrow suppression and kidney toxicity. Ilumya Counseling: I discussed with the patient the risks of tildrakizumab including but not limited to immunosuppression, malignancy, posterior leukoencephalopathy syndrome, and serious infections.  The patient understands that monitoring is required including a PPD at baseline and must alert us or the primary physician if symptoms of infection or other concerning signs are noted. Dutasteride Female Counseling: Dutasteride Counseling:  I discussed with the patient the risks of use of dutasteride including but not limited to decreased libido and sexual dysfunction. Explained the teratogenic nature of the medication and stressed the importance of not getting pregnant during treatment. All of the patient's questions and concerns were addressed. Tranexamic Acid Pregnancy And Lactation Text: It is unknown if this medication is safe during pregnancy or breast feeding. Xolair Pregnancy And Lactation Text: This medication is Pregnancy Category B and is considered safe during pregnancy. This medication is excreted in breast milk. Terbinafine Counseling: Patient counseling regarding adverse effects of terbinafine including but not limited to headache, diarrhea, rash, upset stomach, liver function test abnormalities, itching, taste/smell disturbance, nausea, abdominal pain, and flatulence.  There is a rare possibility of liver failure that can occur when taking terbinafine.  The patient understands that a baseline LFT and kidney function test may be required. The patient verbalized understanding of the proper use and possible adverse effects of terbinafine.  All of the patient's questions and concerns were addressed. Soolantra Pregnancy And Lactation Text: This medication is Pregnancy Category C. This medication is considered safe during breast feeding. Minocycline Counseling: Patient advised regarding possible photosensitivity and discoloration of the teeth, skin, lips, tongue and gums.  Patient instructed to avoid sunlight, if possible.  When exposed to sunlight, patients should wear protective clothing, sunglasses, and sunscreen.  The patient was instructed to call the office immediately if the following severe adverse effects occur:  hearing changes, easy bruising/bleeding, severe headache, or vision changes.  The patient verbalized understanding of the proper use and possible adverse effects of minocycline.  All of the patient's questions and concerns were addressed. Nsaids Pregnancy And Lactation Text: These medications are considered safe up to 30 weeks gestation. It is excreted in breast milk. VTAMA Counseling: I discussed with the patient that VTAMA is not for use in the eyes, mouth or mouth. They should call the office if they develop any signs of allergic reactions to VTAMA. The patient verbalized understanding of the proper use and possible adverse effects of VTAMA.  All of the patient's questions and concerns were addressed. Imiquimod Counseling:  I discussed with the patient the risks of imiquimod including but not limited to erythema, scaling, itching, weeping, crusting, and pain.  Patient understands that the inflammatory response to imiquimod is variable from person to person and was educated regarded proper titration schedule.  If flu-like symptoms develop, patient knows to discontinue the medication and contact us. Cephalexin Pregnancy And Lactation Text: This medication is Pregnancy Category B and considered safe during pregnancy.  It is also excreted in breast milk but can be used safely for shorter doses. Oxybutynin Counseling:  I discussed with the patient the risks of oxybutynin including but not limited to skin rash, drowsiness, dry mouth, difficulty urinating, and blurred vision. Cyclosporine Pregnancy And Lactation Text: This medication is Pregnancy Category C and it isn't know if it is safe during pregnancy. This medication is excreted in breast milk. Protopic Counseling: Patient may experience a mild burning sensation during topical application. Protopic is not approved in children less than 2 years of age. There have been case reports of hematologic and skin malignancies in patients using topical calcineurin inhibitors although causality is questionable. Skyrizi Counseling: I discussed with the patient the risks of risankizumab-rzaa including but not limited to immunosuppression, and serious infections.  The patient understands that monitoring is required including a PPD at baseline and must alert us or the primary physician if symptoms of infection or other concerning signs are noted. Acitretin Pregnancy And Lactation Text: This medication is Pregnancy Category X and should not be given to women who are pregnant or may become pregnant in the future. This medication is excreted in breast milk. Topical Metronidazole Pregnancy And Lactation Text: This medication is Pregnancy Category B and considered safe during pregnancy.  It is also considered safe to use while breastfeeding. Olumiant Counseling: I discussed with the patient the risks of Olumiant therapy including but not limited to upper respiratory tract infections, shingles, cold sores, and nausea. Live vaccines should be avoided.  This medication has been linked to serious infections; higher rate of mortality; malignancy and lymphoproliferative disorders; major adverse cardiovascular events; thrombosis; gastrointestinal perforations; neutropenia; lymphopenia; anemia; liver enzyme elevations; and lipid elevations. Glycopyrrolate Counseling:  I discussed with the patient the risks of glycopyrrolate including but not limited to skin rash, drowsiness, dry mouth, difficulty urinating, and blurred vision. Detail Level: Zone Terbinafine Pregnancy And Lactation Text: This medication is Pregnancy Category B and is considered safe during pregnancy. It is also excreted in breast milk and breast feeding isn't recommended. Valtrex Counseling: I discussed with the patient the risks of valacyclovir including but not limited to kidney damage, nausea, vomiting and severe allergy.  The patient understands that if the infection seems to be worsening or is not improving, they are to call. Spironolactone Pregnancy And Lactation Text: This medication can cause feminization of the male fetus and should be avoided during pregnancy. The active metabolite is also found in breast milk. Azelaic Acid Counseling: Patient counseled that medicine may cause skin irritation and to avoid applying near the eyes.  In the event of skin irritation, the patient was advised to reduce the amount of the drug applied or use it less frequently.   The patient verbalized understanding of the proper use and possible adverse effects of azelaic acid.  All of the patient's questions and concerns were addressed. Dutasteride Pregnancy And Lactation Text: This medication is absolutely contraindicated in women, especially during pregnancy and breast feeding. Feminization of male fetuses is possible if taking while pregnant. Topical Retinoid counseling:  Patient advised to apply a pea-sized amount only at bedtime and wait 30 minutes after washing their face before applying.  If too drying, patient may add a non-comedogenic moisturizer. The patient verbalized understanding of the proper use and possible adverse effects of retinoids.  All of the patient's questions and concerns were addressed. Vtama Pregnancy And Lactation Text: It is unknown if this medication can cause problems during pregnancy and breastfeeding. Methotrexate Counseling:  Patient counseled regarding adverse effects of methotrexate including but not limited to nausea, vomiting, abnormalities in liver function tests. Patients may develop mouth sores, rash, diarrhea, and abnormalities in blood counts. The patient understands that monitoring is required including LFT's and blood counts.  There is a rare possibility of scarring of the liver and lung problems that can occur when taking methotrexate. Persistent nausea, loss of appetite, pale stools, dark urine, cough, and shortness of breath should be reported immediately. Patient advised to discontinue methotrexate treatment at least three months before attempting to become pregnant.  I discussed the need for folate supplements while taking methotrexate.  These supplements can decrease side effects during methotrexate treatment. The patient verbalized understanding of the proper use and possible adverse effects of methotrexate.  All of the patient's questions and concerns were addressed. Fluconazole Counseling:  Patient counseled regarding adverse effects of fluconazole including but not limited to headache, diarrhea, nausea, upset stomach, liver function test abnormalities, taste disturbance, and stomach pain.  There is a rare possibility of liver failure that can occur when taking fluconazole.  The patient understands that monitoring of LFTs and kidney function test may be required, especially at baseline. The patient verbalized understanding of the proper use and possible adverse effects of fluconazole.  All of the patient's questions and concerns were addressed. Olanzapine Counseling- I discussed with the patient the common side effects of olanzapine including but are not limited to: lack of energy, dry mouth, increased appetite, sleepiness, tremor, constipation, dizziness, changes in behavior, or restlessness.  Explained that teenagers are more likely to experience headaches, abdominal pain, pain in the arms or legs, tiredness, and sleepiness.  Serious side effects include but are not limited: increased risk of death in elderly patients who are confused, have memory loss, or dementia-related psychosis; hyperglycemia; increased cholesterol and triglycerides; and weight gain. Clindamycin Counseling: I counseled the patient regarding use of clindamycin as an antibiotic for prophylactic and/or therapeutic purposes. Clindamycin is active against numerous classes of bacteria, including skin bacteria. Side effects may include nausea, diarrhea, gastrointestinal upset, rash, hives, yeast infections, and in rare cases, colitis. Dupixent Counseling: I discussed with the patient the risks of dupilumab including but not limited to eye inflammation and irritation, cold sores, injection site reactions, allergic reactions and increased risk of parasitic infection. The patient understands that monitoring is required and they must alert us or the primary physician if symptoms of infection or other concerning signs are noted. Protopic Pregnancy And Lactation Text: This medication is Pregnancy Category C. It is unknown if this medication is excreted in breast milk when applied topically. Erivedge Counseling- I discussed with the patient the risks of Erivedge including but not limited to nausea, vomiting, diarrhea, constipation, weight loss, changes in the sense of taste, decreased appetite, muscle spasms, and hair loss.  The patient verbalized understanding of the proper use and possible adverse effects of Erivedge.  All of the patient's questions and concerns were addressed. Olumiant Pregnancy And Lactation Text: Based on animal studies, Olumiant may cause embryo-fetal harm when administered to pregnant women.  The medication should not be used in pregnancy.  Breastfeeding is not recommended during treatment. Drysol Counseling:  I discussed with the patient the risks of drysol/aluminum chloride including but not limited to skin rash, itching, irritation, burning. Topical Steroids Counseling: I discussed with the patient that prolonged use of topical steroids can result in the increased appearance of superficial blood vessels (telangiectasias), lightening (hypopigmentation) and thinning of the skin (atrophy).  Patient understands to avoid using high potency steroids in skin folds, the groin or the face.  The patient verbalized understanding of the proper use and possible adverse effects of topical steroids.  All of the patient's questions and concerns were addressed. Glycopyrrolate Pregnancy And Lactation Text: This medication is Pregnancy Category B and is considered safe during pregnancy. It is unknown if it is excreted breast milk. Bexarotene Counseling:  I discussed with the patient the risks of bexarotene including but not limited to hair loss, dry lips/skin/eyes, liver abnormalities, hyperlipidemia, pancreatitis, depression/suicidal ideation, photosensitivity, drug rash/allergic reactions, hypothyroidism, anemia, leukopenia, infection, cataracts, and teratogenicity.  Patient understands that they will need regular blood tests to check lipid profile, liver function tests, white blood cell count, thyroid function tests and pregnancy test if applicable. Finasteride Male Counseling: Finasteride Counseling:  I discussed with the patient the risks of use of finasteride including but not limited to decreased libido, decreased ejaculate volume, gynecomastia, and depression. Women should not handle medication.  All of the patient's questions and concerns were addressed. Valtrex Pregnancy And Lactation Text: this medication is Pregnancy Category B and is considered safe during pregnancy. This medication is not directly found in breast milk but it's metabolite acyclovir is present. Infliximab Counseling:  I discussed with the patient the risks of infliximab including but not limited to myelosuppression, immunosuppression, autoimmune hepatitis, demyelinating diseases, lymphoma, and serious infections.  The patient understands that monitoring is required including a PPD at baseline and must alert us or the primary physician if symptoms of infection or other concerning signs are noted. Include Pregnancy/Lactation Warning?: No Klisyri Counseling:  I discussed with the patient the risks of Klisyri including but not limited to erythema, scaling, itching, weeping, crusting, and pain. Clofazimine Counseling:  I discussed with the patient the risks of clofazimine including but not limited to skin and eye pigmentation, liver damage, nausea/vomiting, gastrointestinal bleeding and allergy. Doxepin Counseling:  Patient advised that the medication is sedating and not to drive a car after taking this medication. Patient informed of potential adverse effects including but not limited to dry mouth, urinary retention, and blurry vision.  The patient verbalized understanding of the proper use and possible adverse effects of doxepin.  All of the patient's questions and concerns were addressed. Azelaic Acid Pregnancy And Lactation Text: This medication is considered safe during pregnancy and breast feeding. Quinolones Counseling:  I discussed with the patient the risks of fluoroquinolones including but not limited to GI upset, allergic reaction, drug rash, diarrhea, dizziness, photosensitivity, yeast infections, liver function test abnormalities, tendonitis/tendon rupture. Olanzapine Pregnancy And Lactation Text: This medication is pregnancy category C.   There are no adequate and well controlled trials with olanzapine in pregnant females.  Olanzapine should be used during pregnancy only if the potential benefit justifies the potential risk to the fetus.   In a study in lactating healthy women, olanzapine was excreted in breast milk.  It is recommended that women taking olanzapine should not breast feed. Zoryve Counseling:  I discussed with the patient that Zoryve is not for use in the eyes, mouth or vagina. The most commonly reported side effects include diarrhea, headache, insomnia, application site pain, upper respiratory tract infections, and urinary tract infections.  All of the patient's questions and concerns were addressed. Dupixent Pregnancy And Lactation Text: This medication likely crosses the placenta but the risk for the fetus is uncertain. This medication is excreted in breast milk. Propranolol Counseling:  I discussed with the patient the risks of propranolol including but not limited to low heart rate, low blood pressure, low blood sugar, restlessness and increased cold sensitivity. They should call the office if they experience any of these side effects. Methotrexate Pregnancy And Lactation Text: This medication is Pregnancy Category X and is known to cause fetal harm. This medication is excreted in breast milk. Azathioprine Counseling:  I discussed with the patient the risks of azathioprine including but not limited to myelosuppression, immunosuppression, hepatotoxicity, lymphoma, and infections.  The patient understands that monitoring is required including baseline LFTs, Creatinine, possible TPMP genotyping and weekly CBCs for the first month and then every 2 weeks thereafter.  The patient verbalized understanding of the proper use and possible adverse effects of azathioprine.  All of the patient's questions and concerns were addressed. Stelara Counseling:  I discussed with the patient the risks of ustekinumab including but not limited to immunosuppression, malignancy, posterior leukoencephalopathy syndrome, and serious infections.  The patient understands that monitoring is required including a PPD at baseline and must alert us or the primary physician if symptoms of infection or other concerning signs are noted. Qbrexza Counseling:  I discussed with the patient the risks of Qbrexza including but not limited to headache, mydriasis, blurred vision, dry eyes, nasal dryness, dry mouth, dry throat, dry skin, urinary hesitation, and constipation.  Local skin reactions including erythema, burning, stinging, and itching can also occur. Clindamycin Pregnancy And Lactation Text: This medication can be used in pregnancy if certain situations. Clindamycin is also present in breast milk. Bexarotene Pregnancy And Lactation Text: This medication is Pregnancy Category X and should not be given to women who are pregnant or may become pregnant. This medication should not be used if you are breast feeding. Hydroxychloroquine Counseling:  I discussed with the patient that a baseline ophthalmologic exam is needed at the start of therapy and every year thereafter while on therapy. A CBC may also be warranted for monitoring.  The side effects of this medication were discussed with the patient, including but not limited to agranulocytosis, aplastic anemia, seizures, rashes, retinopathy, and liver toxicity. Patient instructed to call the office should any adverse effect occur.  The patient verbalized understanding of the proper use and possible adverse effects of Plaquenil.  All the patient's questions and concerns were addressed. Topical Steroids Applications Pregnancy And Lactation Text: Most topical steroids are considered safe to use during pregnancy and lactation.  Any topical steroid applied to the breast or nipple should be washed off before breastfeeding. Rinvoq Counseling: I discussed with the patient the risks of Rinvoq therapy including but not limited to upper respiratory tract infections, shingles, cold sores, bronchitis, nausea, cough, fever, acne, and headache. Live vaccines should be avoided.  This medication has been linked to serious infections; higher rate of mortality; malignancy and lymphoproliferative disorders; major adverse cardiovascular events; thrombosis; thrombocytopenia, anemia, and neutropenia; lipid elevations; liver enzyme elevations; and gastrointestinal perforations. Adbry Counseling: I discussed with the patient the risks of tralokinumab including but not limited to eye infection and irritation, cold sores, injection site reactions, worsening of asthma, allergic reactions and increased risk of parasitic infection.  Live vaccines should be avoided while taking tralokinumab. The patient understands that monitoring is required and they must alert us or the primary physician if symptoms of infection or other concerning signs are noted. Benzoyl Peroxide Counseling: Patient counseled that medicine may cause skin irritation and bleach clothing.  In the event of skin irritation, the patient was advised to reduce the amount of the drug applied or use it less frequently.   The patient verbalized understanding of the proper use and possible adverse effects of benzoyl peroxide.  All of the patient's questions and concerns were addressed. Doxepin Pregnancy And Lactation Text: This medication is Pregnancy Category C and it isn't known if it is safe during pregnancy. It is also excreted in breast milk and breast feeding isn't recommended. Finasteride Female Counseling: Finasteride Counseling:  I discussed with the patient the risks of use of finasteride including but not limited to decreased libido and sexual dysfunction. Explained the teratogenic nature of the medication and stressed the importance of not getting pregnant during treatment. All of the patient's questions and concerns were addressed. Tazorac Counseling:  Patient advised that medication is irritating and drying.  Patient may need to apply sparingly and wash off after an hour before eventually leaving it on overnight.  The patient verbalized understanding of the proper use and possible adverse effects of tazorac.  All of the patient's questions and concerns were addressed. Propranolol Pregnancy And Lactation Text: This medication is Pregnancy Category C and it isn't known if it is safe during pregnancy. It is excreted in breast milk. Klisyri Pregnancy And Lactation Text: It is unknown if this medication can harm a developing fetus or if it is excreted in breast milk. Oral Minoxidil Counseling- I discussed with the patient the risks of oral minoxidil including but not limited to shortness of breath, swelling of the feet or ankles, dizziness, lightheadedness, unwanted hair growth and allergic reaction.  The patient verbalized understanding of the proper use and possible adverse effects of oral minoxidil.  All of the patient's questions and concerns were addressed. Enbrel Counseling:  I discussed with the patient the risks of etanercept including but not limited to myelosuppression, immunosuppression, autoimmune hepatitis, demyelinating diseases, lymphoma, and infections.  The patient understands that monitoring is required including a PPD at baseline and must alert us or the primary physician if symptoms of infection or other concerning signs are noted. Griseofulvin Counseling:  I discussed with the patient the risks of griseofulvin including but not limited to photosensitivity, cytopenia, liver damage, nausea/vomiting and severe allergy.  The patient understands that this medication is best absorbed when taken with a fatty meal (e.g., ice cream or french fries). Rinvoq Pregnancy And Lactation Text: Based on animal studies, Rinvoq may cause embryo-fetal harm when administered to pregnant women.  The medication should not be used in pregnancy.  Breastfeeding is not recommended during treatment and for 6 days after the last dose. Elidel Counseling: Patient may experience a mild burning sensation during topical application. Elidel is not approved in children less than 2 years of age. There have been case reports of hematologic and skin malignancies in patients using topical calcineurin inhibitors although causality is questionable. Prednisone Counseling:  I discussed with the patient the risks of prolonged use of prednisone including but not limited to weight gain, insomnia, osteoporosis, mood changes, diabetes, susceptibility to infection, glaucoma and high blood pressure.  In cases where prednisone use is prolonged, patients should be monitored with blood pressure checks, serum glucose levels and an eye exam.  Additionally, the patient may need to be placed on GI prophylaxis, PCP prophylaxis, and calcium and vitamin D supplementation and/or a bisphosphonate.  The patient verbalized understanding of the proper use and the possible adverse effects of prednisone.  All of the patient's questions and concerns were addressed. Qbrexza Pregnancy And Lactation Text: There is no available data on Qbrexza use in pregnant women.  There is no available data on Qbrexza use in lactation. Doxycycline Counseling:  Patient counseled regarding possible photosensitivity and increased risk for sunburn.  Patient instructed to avoid sunlight, if possible.  When exposed to sunlight, patients should wear protective clothing, sunglasses, and sunscreen.  The patient was instructed to call the office immediately if the following severe adverse effects occur:  hearing changes, easy bruising/bleeding, severe headache, or vision changes.  The patient verbalized understanding of the proper use and possible adverse effects of doxycycline.  All of the patient's questions and concerns were addressed. Hydroxychloroquine Pregnancy And Lactation Text: This medication has been shown to cause fetal harm but it isn't assigned a Pregnancy Risk Category. There are small amounts excreted in breast milk. Topical Sulfur Applications Counseling: Topical Sulfur Counseling: Patient counseled that this medication may cause skin irritation or allergic reactions.  In the event of skin irritation, the patient was advised to reduce the amount of the drug applied or use it less frequently.   The patient verbalized understanding of the proper use and possible adverse effects of topical sulfur application.  All of the patient's questions and concerns were addressed. Finasteride Pregnancy And Lactation Text: This medication is absolutely contraindicated during pregnancy. It is unknown if it is excreted in breast milk. Adbry Pregnancy And Lactation Text: It is unknown if this medication will adversely affect pregnancy or breast feeding. Libtayo Counseling- I discussed with the patient the risks of Libtayo including but not limited to nausea, vomiting, diarrhea, and bone or muscle pain.  The patient verbalized understanding of the proper use and possible adverse effects of Libtayo.  All of the patient's questions and concerns were addressed. Hydroxyzine Counseling: Patient advised that the medication is sedating and not to drive a car after taking this medication.  Patient informed of potential adverse effects including but not limited to dry mouth, urinary retention, and blurry vision.  The patient verbalized understanding of the proper use and possible adverse effects of hydroxyzine.  All of the patient's questions and concerns were addressed. Rituxan Counseling:  I discussed with the patient the risks of Rituxan infusions. Side effects can include infusion reactions, severe drug rashes including mucocutaneous reactions, reactivation of latent hepatitis and other infections and rarely progressive multifocal leukoencephalopathy.  All of the patient's questions and concerns were addressed. Benzoyl Peroxide Pregnancy And Lactation Text: This medication is Pregnancy Category C. It is unknown if benzoyl peroxide is excreted in breast milk. Isotretinoin Counseling: Patient should get monthly blood tests, not donate blood, not drive at night if vision affected, not share medication, and not undergo elective surgery for 6 months after tx completed. Side effects reviewed, pt to contact office should one occur. Tazorac Pregnancy And Lactation Text: This medication is not safe during pregnancy. It is unknown if this medication is excreted in breast milk. Colchicine Counseling:  Patient counseled regarding adverse effects including but not limited to stomach upset (nausea, vomiting, stomach pain, or diarrhea).  Patient instructed to limit alcohol consumption while taking this medication.  Colchicine may reduce blood counts especially with prolonged use.  The patient understands that monitoring of kidney function and blood counts may be required, especially at baseline. The patient verbalized understanding of the proper use and possible adverse effects of colchicine.  All of the patient's questions and concerns were addressed. SSKI Counseling:  I discussed with the patient the risks of SSKI including but not limited to thyroid abnormalities, metallic taste, GI upset, fever, headache, acne, arthralgias, paraesthesias, lymphadenopathy, easy bleeding, arrhythmias, and allergic reaction. Griseofulvin Pregnancy And Lactation Text: This medication is Pregnancy Category X and is known to cause serious birth defects. It is unknown if this medication is excreted in breast milk but breast feeding should be avoided. Oral Minoxidil Pregnancy And Lactation Text: This medication should only be used when clearly needed if you are pregnant, attempting to become pregnant or breast feeding. Rifampin Counseling: I discussed with the patient the risks of rifampin including but not limited to liver damage, kidney damage, red-orange body fluids, nausea/vomiting and severe allergy. Cellcept Counseling:  I discussed with the patient the risks of mycophenolate mofetil including but not limited to infection/immunosuppression, GI upset, hypokalemia, hypercholesterolemia, bone marrow suppression, lymphoproliferative disorders, malignancy, GI ulceration/bleed/perforation, colitis, interstitial lung disease, kidney failure, progressive multifocal leukoencephalopathy, and birth defects.  The patient understands that monitoring is required including a baseline creatinine and regular CBC testing. In addition, patient must alert us immediately if symptoms of infection or other concerning signs are noted. Zyclara Counseling:  I discussed with the patient the risks of imiquimod including but not limited to erythema, scaling, itching, weeping, crusting, and pain.  Patient understands that the inflammatory response to imiquimod is variable from person to person and was educated regarded proper titration schedule.  If flu-like symptoms develop, patient knows to discontinue the medication and contact us. Minoxidil Counseling: Minoxidil is a topical medication which can increase blood flow where it is applied. It is uncertain how this medication increases hair growth. Side effects are uncommon and include stinging and allergic reactions. Sotyktu Counseling:  I discussed the most common side effects of Sotyktu including: common cold, sore throat, sinus infections, cold sores, canker sores, folliculitis, and acne.  I also discussed more serious side effects of Sotyktu including but not limited to: serious allergic reactions; increased risk for infections such as TB; cancers such as lymphomas; rhabdomyolysis and elevated CPK; and elevated triglycerides and liver enzymes.  Rhofade Counseling: Rhofade is a topical medication which can decrease superficial blood flow where applied. Side effects are uncommon and include stinging, redness and allergic reactions. Low Dose Naltrexone Counseling- I discussed with the patient the potential risks and side effects of low dose naltrexone including but not limited to: more vivid dreams, headaches, nausea, vomiting, abdominal pain, fatigue, dizziness, and anxiety. Taltz Counseling: I discussed with the patient the risks of ixekizumab including but not limited to immunosuppression, serious infections, worsening of inflammatory bowel disease and drug reactions.  The patient understands that monitoring is required including a PPD at baseline and must alert us or the primary physician if symptoms of infection or other concerning signs are noted.

## 2024-10-07 NOTE — BH NOTES
Patient is  Going home tomorrow. Patient plan are to find meeting to go to for  his substance abuse. Patient has great family support. Patient  Stated that he is trying to get better and live a better life. Patient will continue to set goals for discharge. S/w pt. Conveyed Dr. Caputo's message and recommendations. Pt verbalized understanding. Pt reports she will take the oral iron.